# Patient Record
Sex: FEMALE | Race: BLACK OR AFRICAN AMERICAN | NOT HISPANIC OR LATINO
[De-identification: names, ages, dates, MRNs, and addresses within clinical notes are randomized per-mention and may not be internally consistent; named-entity substitution may affect disease eponyms.]

---

## 2017-01-27 ENCOUNTER — APPOINTMENT (OUTPATIENT)
Dept: ENDOCRINOLOGY | Facility: CLINIC | Age: 67
End: 2017-01-27

## 2017-02-01 ENCOUNTER — APPOINTMENT (OUTPATIENT)
Dept: RHEUMATOLOGY | Facility: CLINIC | Age: 67
End: 2017-02-01

## 2017-02-01 VITALS
BODY MASS INDEX: 36.14 KG/M2 | HEART RATE: 53 BPM | HEIGHT: 63 IN | SYSTOLIC BLOOD PRESSURE: 131 MMHG | DIASTOLIC BLOOD PRESSURE: 78 MMHG | WEIGHT: 204 LBS | TEMPERATURE: 98.1 F | OXYGEN SATURATION: 93 %

## 2017-02-01 DIAGNOSIS — M54.30 SCIATICA, UNSPECIFIED SIDE: ICD-10-CM

## 2017-02-01 DIAGNOSIS — M70.60 TROCHANTERIC BURSITIS, UNSPECIFIED HIP: ICD-10-CM

## 2017-02-02 ENCOUNTER — CLINICAL ADVICE (OUTPATIENT)
Age: 67
End: 2017-02-02

## 2017-02-02 LAB
ALBUMIN SERPL ELPH-MCNC: 3.8 G/DL
ALP BLD-CCNC: 44 U/L
ALT SERPL-CCNC: 18 U/L
ANION GAP SERPL CALC-SCNC: 17 MMOL/L
AST SERPL-CCNC: 19 U/L
BASOPHILS # BLD AUTO: 0.05 K/UL
BASOPHILS NFR BLD AUTO: 1.1 %
BILIRUB SERPL-MCNC: 0.3 MG/DL
BUN SERPL-MCNC: 14 MG/DL
CALCIUM SERPL-MCNC: 9.4 MG/DL
CHLORIDE SERPL-SCNC: 101 MMOL/L
CO2 SERPL-SCNC: 26 MMOL/L
CREAT SERPL-MCNC: 0.84 MG/DL
CRP SERPL-MCNC: <0.2 MG/DL
EOSINOPHIL # BLD AUTO: 0.09 K/UL
EOSINOPHIL NFR BLD AUTO: 1.9 %
ERYTHROCYTE [SEDIMENTATION RATE] IN BLOOD BY WESTERGREN METHOD: 36 MM/HR
GLUCOSE SERPL-MCNC: 112 MG/DL
HCT VFR BLD CALC: 39.1 %
HGB BLD-MCNC: 12.3 G/DL
IMM GRANULOCYTES NFR BLD AUTO: 0 %
LYMPHOCYTES # BLD AUTO: 2.4 K/UL
LYMPHOCYTES NFR BLD AUTO: 51.8 %
MAN DIFF?: NORMAL
MCHC RBC-ENTMCNC: 28 PG
MCHC RBC-ENTMCNC: 31.5 GM/DL
MCV RBC AUTO: 89.1 FL
MONOCYTES # BLD AUTO: 0.4 K/UL
MONOCYTES NFR BLD AUTO: 8.6 %
NEUTROPHILS # BLD AUTO: 1.69 K/UL
NEUTROPHILS NFR BLD AUTO: 36.6 %
PLATELET # BLD AUTO: 212 K/UL
POTASSIUM SERPL-SCNC: 3 MMOL/L
PROT SERPL-MCNC: 6.7 G/DL
RBC # BLD: 4.39 M/UL
RBC # FLD: 14.6 %
SODIUM SERPL-SCNC: 144 MMOL/L
WBC # FLD AUTO: 4.63 K/UL

## 2017-02-22 ENCOUNTER — OTHER (OUTPATIENT)
Age: 67
End: 2017-02-22

## 2017-03-03 ENCOUNTER — APPOINTMENT (OUTPATIENT)
Dept: RHEUMATOLOGY | Facility: CLINIC | Age: 67
End: 2017-03-03

## 2017-03-17 ENCOUNTER — APPOINTMENT (OUTPATIENT)
Dept: RHEUMATOLOGY | Facility: CLINIC | Age: 67
End: 2017-03-17

## 2017-03-17 VITALS
WEIGHT: 205 LBS | TEMPERATURE: 98.1 F | SYSTOLIC BLOOD PRESSURE: 161 MMHG | HEART RATE: 58 BPM | OXYGEN SATURATION: 95 % | HEIGHT: 63 IN | BODY MASS INDEX: 36.32 KG/M2 | DIASTOLIC BLOOD PRESSURE: 83 MMHG

## 2017-03-20 ENCOUNTER — TRANSCRIPTION ENCOUNTER (OUTPATIENT)
Age: 67
End: 2017-03-20

## 2017-03-20 LAB
ALBUMIN SERPL ELPH-MCNC: 4.1 G/DL
ALP BLD-CCNC: 46 U/L
ALT SERPL-CCNC: 28 U/L
ANION GAP SERPL CALC-SCNC: 15 MMOL/L
AST SERPL-CCNC: 25 U/L
BASOPHILS # BLD AUTO: 0.01 K/UL
BASOPHILS NFR BLD AUTO: 0.2 %
BILIRUB SERPL-MCNC: 0.2 MG/DL
BUN SERPL-MCNC: 13 MG/DL
CALCIUM SERPL-MCNC: 10.2 MG/DL
CHLORIDE SERPL-SCNC: 100 MMOL/L
CO2 SERPL-SCNC: 29 MMOL/L
CREAT SERPL-MCNC: 0.72 MG/DL
CRP SERPL-MCNC: <0.2 MG/DL
EOSINOPHIL # BLD AUTO: 0.07 K/UL
EOSINOPHIL NFR BLD AUTO: 1.5 %
ERYTHROCYTE [SEDIMENTATION RATE] IN BLOOD BY WESTERGREN METHOD: 30 MM/HR
GLUCOSE SERPL-MCNC: 97 MG/DL
HCT VFR BLD CALC: 41 %
HGB BLD-MCNC: 13.1 G/DL
IMM GRANULOCYTES NFR BLD AUTO: 0.2 %
LDH SERPL-CCNC: 207 U/L
LYMPHOCYTES # BLD AUTO: 1.19 K/UL
LYMPHOCYTES NFR BLD AUTO: 25.9 %
MAN DIFF?: NORMAL
MCHC RBC-ENTMCNC: 28.5 PG
MCHC RBC-ENTMCNC: 32 GM/DL
MCV RBC AUTO: 89.3 FL
MONOCYTES # BLD AUTO: 0.61 K/UL
MONOCYTES NFR BLD AUTO: 13.3 %
NEUTROPHILS # BLD AUTO: 2.7 K/UL
NEUTROPHILS NFR BLD AUTO: 58.9 %
PLATELET # BLD AUTO: 229 K/UL
POTASSIUM SERPL-SCNC: 3.4 MMOL/L
PROT SERPL-MCNC: 7 G/DL
RBC # BLD: 4.59 M/UL
RBC # FLD: 15 %
SODIUM SERPL-SCNC: 144 MMOL/L
WBC # FLD AUTO: 4.59 K/UL

## 2017-03-23 ENCOUNTER — FORM ENCOUNTER (OUTPATIENT)
Age: 67
End: 2017-03-23

## 2017-03-24 ENCOUNTER — OUTPATIENT (OUTPATIENT)
Dept: OUTPATIENT SERVICES | Facility: HOSPITAL | Age: 67
LOS: 1 days | End: 2017-03-24
Payer: COMMERCIAL

## 2017-03-24 ENCOUNTER — APPOINTMENT (OUTPATIENT)
Dept: CT IMAGING | Facility: IMAGING CENTER | Age: 67
End: 2017-03-24

## 2017-03-24 DIAGNOSIS — R10.9 UNSPECIFIED ABDOMINAL PAIN: ICD-10-CM

## 2017-03-24 PROCEDURE — 74176 CT ABD & PELVIS W/O CONTRAST: CPT

## 2017-05-31 ENCOUNTER — FORM ENCOUNTER (OUTPATIENT)
Age: 67
End: 2017-05-31

## 2017-06-01 ENCOUNTER — APPOINTMENT (OUTPATIENT)
Dept: RHEUMATOLOGY | Facility: CLINIC | Age: 67
End: 2017-06-01

## 2017-06-01 VITALS
DIASTOLIC BLOOD PRESSURE: 84 MMHG | BODY MASS INDEX: 38.45 KG/M2 | HEART RATE: 63 BPM | OXYGEN SATURATION: 95 % | SYSTOLIC BLOOD PRESSURE: 149 MMHG | HEIGHT: 63 IN | WEIGHT: 217 LBS

## 2017-06-01 RX ORDER — TOFACITINIB 11 MG/1
11 TABLET, FILM COATED, EXTENDED RELEASE ORAL
Qty: 90 | Refills: 0 | Status: DISCONTINUED | COMMUNITY
Start: 2017-02-01 | End: 2017-06-01

## 2017-06-02 LAB
ALBUMIN SERPL ELPH-MCNC: 3.9 G/DL
ALP BLD-CCNC: 47 U/L
ALT SERPL-CCNC: 19 U/L
ANION GAP SERPL CALC-SCNC: 22 MMOL/L
AST SERPL-CCNC: 19 U/L
BASOPHILS # BLD AUTO: 0.02 K/UL
BASOPHILS NFR BLD AUTO: 0.4 %
BILIRUB SERPL-MCNC: 0.3 MG/DL
BUN SERPL-MCNC: 16 MG/DL
CALCIUM SERPL-MCNC: 9.9 MG/DL
CHLORIDE SERPL-SCNC: 102 MMOL/L
CO2 SERPL-SCNC: 23 MMOL/L
CREAT SERPL-MCNC: 0.86 MG/DL
CRP SERPL-MCNC: 0.2 MG/DL
EOSINOPHIL # BLD AUTO: 0.13 K/UL
EOSINOPHIL NFR BLD AUTO: 2.7 %
ERYTHROCYTE [SEDIMENTATION RATE] IN BLOOD BY WESTERGREN METHOD: 31 MM/HR
GLUCOSE SERPL-MCNC: 103 MG/DL
HAV IGM SER QL: NONREACTIVE
HBA1C MFR BLD HPLC: 5.9 %
HBV CORE IGG+IGM SER QL: NONREACTIVE
HBV CORE IGM SER QL: NONREACTIVE
HBV SURFACE AG SER QL: NONREACTIVE
HCT VFR BLD CALC: 38.6 %
HCV AB SER QL: NONREACTIVE
HCV S/CO RATIO: 0.1 S/CO
HGB BLD-MCNC: 12.6 G/DL
IGA SER QL IEP: 165 MG/DL
IGG SER QL IEP: 957 MG/DL
IGM SER QL IEP: 61 MG/DL
IMM GRANULOCYTES NFR BLD AUTO: 0.2 %
LYMPHOCYTES # BLD AUTO: 2.05 K/UL
LYMPHOCYTES NFR BLD AUTO: 43.3 %
MAN DIFF?: NORMAL
MCHC RBC-ENTMCNC: 29 PG
MCHC RBC-ENTMCNC: 32.6 GM/DL
MCV RBC AUTO: 88.7 FL
MONOCYTES # BLD AUTO: 0.71 K/UL
MONOCYTES NFR BLD AUTO: 15 %
NEUTROPHILS # BLD AUTO: 1.81 K/UL
NEUTROPHILS NFR BLD AUTO: 38.4 %
PLATELET # BLD AUTO: 245 K/UL
POTASSIUM SERPL-SCNC: 3.8 MMOL/L
PROT SERPL-MCNC: 6.7 G/DL
RBC # BLD: 4.35 M/UL
RBC # FLD: 15 %
SODIUM SERPL-SCNC: 147 MMOL/L
WBC # FLD AUTO: 4.73 K/UL

## 2017-06-03 LAB
ADJUSTED MITOGEN: >10 IU/ML
ADJUSTED TB AG: 0 IU/ML
M TB IFN-G BLD-IMP: NEGATIVE
QUANTIFERON GOLD NIL: 0.06 IU/ML

## 2017-07-13 ENCOUNTER — APPOINTMENT (OUTPATIENT)
Dept: RHEUMATOLOGY | Facility: CLINIC | Age: 67
End: 2017-07-13

## 2017-07-13 VITALS
HEART RATE: 54 BPM | BODY MASS INDEX: 37.39 KG/M2 | OXYGEN SATURATION: 95 % | HEIGHT: 63 IN | DIASTOLIC BLOOD PRESSURE: 73 MMHG | WEIGHT: 211 LBS | SYSTOLIC BLOOD PRESSURE: 154 MMHG

## 2017-07-13 RX ORDER — PREDNISONE 2.5 MG/1
2.5 TABLET ORAL DAILY
Qty: 180 | Refills: 0 | Status: DISCONTINUED | COMMUNITY
Start: 2017-02-01 | End: 2017-07-13

## 2017-07-13 RX ORDER — METHYLPRED ACET/NACL,ISO-OS/PF 40 MG/ML
40 VIAL (ML) INJECTION
Qty: 1 | Refills: 0 | Status: COMPLETED | OUTPATIENT
Start: 2017-07-13

## 2017-07-13 RX ORDER — LIDOCAINE HYDROCHLORIDE 10 MG/ML
1 INJECTION, SOLUTION INFILTRATION; PERINEURAL
Refills: 0 | Status: COMPLETED | OUTPATIENT
Start: 2017-07-13

## 2017-07-13 RX ADMIN — METHYLPREDNISOLONE ACETATE MG/ML: 40 INJECTION, SUSPENSION INTRA-ARTICULAR; INTRALESIONAL; INTRAMUSCULAR; SOFT TISSUE at 00:00

## 2017-07-13 RX ADMIN — LIDOCAINE HYDROCHLORIDE %: 10 INJECTION, SOLUTION INFILTRATION; PERINEURAL at 00:00

## 2017-07-14 LAB
ALBUMIN SERPL ELPH-MCNC: 4.1 G/DL
ALP BLD-CCNC: 49 U/L
ALT SERPL-CCNC: 19 U/L
ANION GAP SERPL CALC-SCNC: 14 MMOL/L
AST SERPL-CCNC: 13 U/L
BASOPHILS # BLD AUTO: 0.04 K/UL
BASOPHILS NFR BLD AUTO: 0.8 %
BILIRUB SERPL-MCNC: 0.3 MG/DL
BUN SERPL-MCNC: 20 MG/DL
CALCIUM SERPL-MCNC: 10.2 MG/DL
CHLORIDE SERPL-SCNC: 104 MMOL/L
CO2 SERPL-SCNC: 29 MMOL/L
CREAT SERPL-MCNC: 0.92 MG/DL
CRP SERPL-MCNC: <0.2 MG/DL
EOSINOPHIL # BLD AUTO: 0.18 K/UL
EOSINOPHIL NFR BLD AUTO: 3.4 %
ERYTHROCYTE [SEDIMENTATION RATE] IN BLOOD BY WESTERGREN METHOD: 22 MM/HR
GLUCOSE SERPL-MCNC: 103 MG/DL
HCT VFR BLD CALC: 39.2 %
HGB BLD-MCNC: 12.3 G/DL
IMM GRANULOCYTES NFR BLD AUTO: 0.2 %
LYMPHOCYTES # BLD AUTO: 2.56 K/UL
LYMPHOCYTES NFR BLD AUTO: 48.6 %
MAN DIFF?: NORMAL
MCHC RBC-ENTMCNC: 28.4 PG
MCHC RBC-ENTMCNC: 31.4 GM/DL
MCV RBC AUTO: 90.5 FL
MONOCYTES # BLD AUTO: 0.54 K/UL
MONOCYTES NFR BLD AUTO: 10.2 %
NEUTROPHILS # BLD AUTO: 1.94 K/UL
NEUTROPHILS NFR BLD AUTO: 36.8 %
PLATELET # BLD AUTO: 243 K/UL
POTASSIUM SERPL-SCNC: 4.5 MMOL/L
PROT SERPL-MCNC: 6.7 G/DL
RBC # BLD: 4.33 M/UL
RBC # FLD: 14.8 %
SODIUM SERPL-SCNC: 147 MMOL/L
WBC # FLD AUTO: 5.27 K/UL

## 2017-09-20 ENCOUNTER — APPOINTMENT (OUTPATIENT)
Dept: RHEUMATOLOGY | Facility: CLINIC | Age: 67
End: 2017-09-20
Payer: MEDICARE

## 2017-09-20 VITALS
WEIGHT: 210 LBS | HEIGHT: 63 IN | OXYGEN SATURATION: 96 % | BODY MASS INDEX: 37.21 KG/M2 | TEMPERATURE: 97.6 F | DIASTOLIC BLOOD PRESSURE: 77 MMHG | SYSTOLIC BLOOD PRESSURE: 131 MMHG | HEART RATE: 58 BPM

## 2017-09-20 PROCEDURE — 99215 OFFICE O/P EST HI 40 MIN: CPT

## 2017-09-22 LAB
ALBUMIN SERPL ELPH-MCNC: 4.1 G/DL
ALP BLD-CCNC: 64 U/L
ALT SERPL-CCNC: 20 U/L
ANION GAP SERPL CALC-SCNC: 15 MMOL/L
AST SERPL-CCNC: 19 U/L
BASOPHILS # BLD AUTO: 0.03 K/UL
BASOPHILS NFR BLD AUTO: 0.7 %
BILIRUB SERPL-MCNC: 0.2 MG/DL
BUN SERPL-MCNC: 15 MG/DL
CALCIUM SERPL-MCNC: 9.5 MG/DL
CHLORIDE SERPL-SCNC: 100 MMOL/L
CO2 SERPL-SCNC: 27 MMOL/L
CREAT SERPL-MCNC: 0.87 MG/DL
CRP SERPL-MCNC: 0.2 MG/DL
EOSINOPHIL # BLD AUTO: 0.3 K/UL
EOSINOPHIL NFR BLD AUTO: 6.6 %
GLUCOSE SERPL-MCNC: 113 MG/DL
HBA1C MFR BLD HPLC: 6 %
HCT VFR BLD CALC: 38.4 %
HGB BLD-MCNC: 12.2 G/DL
IMM GRANULOCYTES NFR BLD AUTO: 0 %
LYMPHOCYTES # BLD AUTO: 2.03 K/UL
LYMPHOCYTES NFR BLD AUTO: 44.8 %
MAN DIFF?: NORMAL
MCHC RBC-ENTMCNC: 28 PG
MCHC RBC-ENTMCNC: 31.8 GM/DL
MCV RBC AUTO: 88.1 FL
MONOCYTES # BLD AUTO: 0.59 K/UL
MONOCYTES NFR BLD AUTO: 13 %
NEUTROPHILS # BLD AUTO: 1.58 K/UL
NEUTROPHILS NFR BLD AUTO: 34.9 %
PLATELET # BLD AUTO: 270 K/UL
POTASSIUM SERPL-SCNC: 3.4 MMOL/L
PROT SERPL-MCNC: 6.8 G/DL
RBC # BLD: 4.36 M/UL
RBC # FLD: 14.7 %
SODIUM SERPL-SCNC: 142 MMOL/L
WBC # FLD AUTO: 4.53 K/UL

## 2017-10-10 ENCOUNTER — RX RENEWAL (OUTPATIENT)
Age: 67
End: 2017-10-10

## 2017-10-11 ENCOUNTER — APPOINTMENT (OUTPATIENT)
Dept: RHEUMATOLOGY | Facility: CLINIC | Age: 67
End: 2017-10-11

## 2017-10-24 ENCOUNTER — APPOINTMENT (OUTPATIENT)
Dept: DERMATOLOGY | Facility: CLINIC | Age: 67
End: 2017-10-24
Payer: MEDICARE

## 2017-10-24 VITALS
DIASTOLIC BLOOD PRESSURE: 102 MMHG | HEIGHT: 64 IN | WEIGHT: 190 LBS | SYSTOLIC BLOOD PRESSURE: 164 MMHG | BODY MASS INDEX: 32.44 KG/M2

## 2017-10-24 DIAGNOSIS — Z86.69 PERSONAL HISTORY OF OTHER DISEASES OF THE NERVOUS SYSTEM AND SENSE ORGANS: ICD-10-CM

## 2017-10-24 DIAGNOSIS — Z86.010 PERSONAL HISTORY OF COLONIC POLYPS: ICD-10-CM

## 2017-10-24 DIAGNOSIS — Z86.39 PERSONAL HISTORY OF OTHER ENDOCRINE, NUTRITIONAL AND METABOLIC DISEASE: ICD-10-CM

## 2017-10-24 DIAGNOSIS — H54.7 UNSPECIFIED VISUAL LOSS: ICD-10-CM

## 2017-10-24 DIAGNOSIS — Z87.2 PERSONAL HISTORY OF DISEASES OF THE SKIN AND SUBCUTANEOUS TISSUE: ICD-10-CM

## 2017-10-24 DIAGNOSIS — L85.9 EPIDERMAL THICKENING, UNSPECIFIED: ICD-10-CM

## 2017-10-24 DIAGNOSIS — Z87.19 PERSONAL HISTORY OF OTHER DISEASES OF THE DIGESTIVE SYSTEM: ICD-10-CM

## 2017-10-24 PROCEDURE — 99203 OFFICE O/P NEW LOW 30 MIN: CPT

## 2017-12-15 ENCOUNTER — APPOINTMENT (OUTPATIENT)
Dept: RHEUMATOLOGY | Facility: CLINIC | Age: 67
End: 2017-12-15
Payer: MEDICARE

## 2017-12-15 VITALS
SYSTOLIC BLOOD PRESSURE: 150 MMHG | WEIGHT: 190 LBS | DIASTOLIC BLOOD PRESSURE: 88 MMHG | TEMPERATURE: 97.6 F | OXYGEN SATURATION: 96 % | HEART RATE: 60 BPM | RESPIRATION RATE: 16 BRPM | BODY MASS INDEX: 32.44 KG/M2 | HEIGHT: 64 IN

## 2017-12-15 PROCEDURE — 20605 DRAIN/INJ JOINT/BURSA W/O US: CPT | Mod: RT

## 2017-12-15 PROCEDURE — 99214 OFFICE O/P EST MOD 30 MIN: CPT | Mod: 25

## 2017-12-15 RX ORDER — LIDOCAINE HYDROCHLORIDE 10 MG/ML
1 INJECTION, SOLUTION INFILTRATION; PERINEURAL
Refills: 0 | Status: COMPLETED | OUTPATIENT
Start: 2017-12-15

## 2017-12-15 RX ORDER — METHYLPRED ACET/NACL,ISO-OS/PF 40 MG/ML
40 VIAL (ML) INJECTION
Qty: 1 | Refills: 0 | Status: COMPLETED | OUTPATIENT
Start: 2017-12-15

## 2017-12-15 RX ADMIN — METHYLPREDNISOLONE ACETATE MG/ML: 40 INJECTION, SUSPENSION INTRA-ARTICULAR; INTRALESIONAL; INTRAMUSCULAR; SOFT TISSUE at 00:00

## 2017-12-15 RX ADMIN — LIDOCAINE HYDROCHLORIDE %: 10 INJECTION, SOLUTION INFILTRATION; PERINEURAL at 00:00

## 2017-12-17 LAB
25(OH)D3 SERPL-MCNC: 20.1 NG/ML
ALBUMIN SERPL ELPH-MCNC: 4.4 G/DL
ALP BLD-CCNC: 55 U/L
ALT SERPL-CCNC: 25 U/L
ANION GAP SERPL CALC-SCNC: 15 MMOL/L
AST SERPL-CCNC: 19 U/L
BASOPHILS # BLD AUTO: 0.03 K/UL
BASOPHILS NFR BLD AUTO: 0.7 %
BILIRUB SERPL-MCNC: 0.3 MG/DL
BUN SERPL-MCNC: 15 MG/DL
CALCIUM SERPL-MCNC: 9.9 MG/DL
CHLORIDE SERPL-SCNC: 101 MMOL/L
CO2 SERPL-SCNC: 29 MMOL/L
CREAT SERPL-MCNC: 0.85 MG/DL
CRP SERPL-MCNC: <0.2 MG/DL
ENA SS-A AB SER IA-ACNC: <0.2 AL
ENA SS-B AB SER IA-ACNC: 0.2 AL
EOSINOPHIL # BLD AUTO: 0.11 K/UL
EOSINOPHIL NFR BLD AUTO: 2.4 %
ERYTHROCYTE [SEDIMENTATION RATE] IN BLOOD BY WESTERGREN METHOD: 44 MM/HR
GLUCOSE SERPL-MCNC: 95 MG/DL
HCT VFR BLD CALC: 40.5 %
HGB BLD-MCNC: 12.8 G/DL
IMM GRANULOCYTES NFR BLD AUTO: 0 %
LYMPHOCYTES # BLD AUTO: 2.18 K/UL
LYMPHOCYTES NFR BLD AUTO: 47.5 %
MAN DIFF?: NORMAL
MCHC RBC-ENTMCNC: 27.8 PG
MCHC RBC-ENTMCNC: 31.6 GM/DL
MCV RBC AUTO: 87.9 FL
MONOCYTES # BLD AUTO: 0.47 K/UL
MONOCYTES NFR BLD AUTO: 10.2 %
NEUTROPHILS # BLD AUTO: 1.8 K/UL
NEUTROPHILS NFR BLD AUTO: 39.2 %
PLATELET # BLD AUTO: 256 K/UL
POTASSIUM SERPL-SCNC: 3.5 MMOL/L
PROT SERPL-MCNC: 7.4 G/DL
RBC # BLD: 4.61 M/UL
RBC # FLD: 15.7 %
SODIUM SERPL-SCNC: 145 MMOL/L
URATE SERPL-MCNC: 3.5 MG/DL
WBC # FLD AUTO: 4.59 K/UL

## 2018-01-02 ENCOUNTER — RX RENEWAL (OUTPATIENT)
Age: 68
End: 2018-01-02

## 2018-04-02 ENCOUNTER — RX RENEWAL (OUTPATIENT)
Age: 68
End: 2018-04-02

## 2018-04-12 ENCOUNTER — APPOINTMENT (OUTPATIENT)
Dept: RHEUMATOLOGY | Facility: CLINIC | Age: 68
End: 2018-04-12

## 2018-04-18 ENCOUNTER — RX RENEWAL (OUTPATIENT)
Age: 68
End: 2018-04-18

## 2018-04-23 ENCOUNTER — RX RENEWAL (OUTPATIENT)
Age: 68
End: 2018-04-23

## 2018-04-27 ENCOUNTER — TRANSCRIPTION ENCOUNTER (OUTPATIENT)
Age: 68
End: 2018-04-27

## 2018-04-27 LAB
ALBUMIN SERPL ELPH-MCNC: 4 G/DL
ALP BLD-CCNC: 51 U/L
ALT SERPL-CCNC: 14 U/L
ANION GAP SERPL CALC-SCNC: 14 MMOL/L
AST SERPL-CCNC: 16 U/L
BASOPHILS # BLD AUTO: 0.02 K/UL
BASOPHILS NFR BLD AUTO: 0.4 %
BILIRUB SERPL-MCNC: 0.3 MG/DL
BUN SERPL-MCNC: 21 MG/DL
CALCIUM SERPL-MCNC: 9.4 MG/DL
CHLORIDE SERPL-SCNC: 102 MMOL/L
CO2 SERPL-SCNC: 27 MMOL/L
CREAT SERPL-MCNC: 0.8 MG/DL
CRP SERPL-MCNC: <0.2 MG/DL
EOSINOPHIL # BLD AUTO: 0.14 K/UL
EOSINOPHIL NFR BLD AUTO: 2.8 %
ERYTHROCYTE [SEDIMENTATION RATE] IN BLOOD BY WESTERGREN METHOD: 29 MM/HR
GLUCOSE SERPL-MCNC: 91 MG/DL
HCT VFR BLD CALC: 37.6 %
HGB BLD-MCNC: 11.7 G/DL
IMM GRANULOCYTES NFR BLD AUTO: 0.2 %
LYMPHOCYTES # BLD AUTO: 2.04 K/UL
LYMPHOCYTES NFR BLD AUTO: 40.7 %
MAN DIFF?: NORMAL
MCHC RBC-ENTMCNC: 28.1 PG
MCHC RBC-ENTMCNC: 31.1 GM/DL
MCV RBC AUTO: 90.4 FL
MONOCYTES # BLD AUTO: 0.74 K/UL
MONOCYTES NFR BLD AUTO: 14.8 %
NEUTROPHILS # BLD AUTO: 2.06 K/UL
NEUTROPHILS NFR BLD AUTO: 41.1 %
PLATELET # BLD AUTO: 236 K/UL
POTASSIUM SERPL-SCNC: 4.2 MMOL/L
PROT SERPL-MCNC: 6.8 G/DL
RBC # BLD: 4.16 M/UL
RBC # FLD: 15.1 %
SODIUM SERPL-SCNC: 143 MMOL/L
WBC # FLD AUTO: 5.01 K/UL

## 2018-05-01 ENCOUNTER — APPOINTMENT (OUTPATIENT)
Dept: RHEUMATOLOGY | Facility: CLINIC | Age: 68
End: 2018-05-01
Payer: MEDICARE

## 2018-05-01 VITALS
DIASTOLIC BLOOD PRESSURE: 72 MMHG | SYSTOLIC BLOOD PRESSURE: 120 MMHG | HEIGHT: 64 IN | WEIGHT: 220 LBS | HEART RATE: 68 BPM | BODY MASS INDEX: 37.56 KG/M2 | TEMPERATURE: 97.4 F | OXYGEN SATURATION: 95 %

## 2018-05-01 DIAGNOSIS — M65.4 RADIAL STYLOID TENOSYNOVITIS [DE QUERVAIN]: ICD-10-CM

## 2018-05-01 PROCEDURE — 99215 OFFICE O/P EST HI 40 MIN: CPT

## 2018-05-01 RX ORDER — HYDROCODONE BITARTRATE AND ACETAMINOPHEN 10; 325 MG/1; MG/1
10-325 TABLET ORAL
Qty: 90 | Refills: 0 | Status: DISCONTINUED | COMMUNITY
Start: 2016-12-15 | End: 2018-05-01

## 2018-05-01 RX ORDER — PREDNISONE 2.5 MG/1
2.5 TABLET ORAL
Qty: 180 | Refills: 0 | Status: DISCONTINUED | COMMUNITY
Start: 2017-09-20 | End: 2018-05-01

## 2018-06-06 ENCOUNTER — APPOINTMENT (OUTPATIENT)
Dept: RHEUMATOLOGY | Facility: CLINIC | Age: 68
End: 2018-06-06
Payer: MEDICARE

## 2018-06-06 VITALS
WEIGHT: 221 LBS | BODY MASS INDEX: 37.73 KG/M2 | HEART RATE: 64 BPM | RESPIRATION RATE: 16 BRPM | TEMPERATURE: 97.8 F | SYSTOLIC BLOOD PRESSURE: 134 MMHG | HEIGHT: 64 IN | OXYGEN SATURATION: 95 % | DIASTOLIC BLOOD PRESSURE: 90 MMHG

## 2018-06-06 DIAGNOSIS — K57.30 DIVERTICULOSIS OF LARGE INTESTINE W/OUT PERFORATION OR ABSCESS W/OUT BLEEDING: ICD-10-CM

## 2018-06-06 PROCEDURE — 96372 THER/PROPH/DIAG INJ SC/IM: CPT

## 2018-06-06 PROCEDURE — 99214 OFFICE O/P EST MOD 30 MIN: CPT | Mod: 25

## 2018-06-06 RX ORDER — CERTOLIZUMAB PEGOL 200 MG/ML
2 X 200 INJECTION, SOLUTION SUBCUTANEOUS
Qty: 3 | Refills: 0 | Status: DISCONTINUED | COMMUNITY
Start: 2018-01-02 | End: 2018-06-06

## 2018-06-06 RX ORDER — METHYLPRED ACET/NACL,ISO-OS/PF 40 MG/ML
40 VIAL (ML) INJECTION
Qty: 1 | Refills: 0 | Status: COMPLETED | OUTPATIENT
Start: 2018-06-06

## 2018-06-06 RX ORDER — CERTOLIZUMAB PEGOL 400 MG
2 X 200 KIT SUBCUTANEOUS
Qty: 6 | Refills: 0 | Status: DISCONTINUED | COMMUNITY
Start: 2017-09-20 | End: 2018-06-06

## 2018-06-06 RX ADMIN — METHYLPREDNISOLONE ACETATE MG/ML: 40 INJECTION, SUSPENSION INTRA-ARTICULAR; INTRALESIONAL; INTRAMUSCULAR; SOFT TISSUE at 00:00

## 2018-06-07 LAB
ALBUMIN SERPL ELPH-MCNC: 4.2 G/DL
ALP BLD-CCNC: 50 U/L
ALT SERPL-CCNC: 21 U/L
ANION GAP SERPL CALC-SCNC: 16 MMOL/L
AST SERPL-CCNC: 22 U/L
BASOPHILS # BLD AUTO: 0.02 K/UL
BASOPHILS NFR BLD AUTO: 0.4 %
BILIRUB SERPL-MCNC: <0.2 MG/DL
BUN SERPL-MCNC: 21 MG/DL
CALCIUM SERPL-MCNC: 10.2 MG/DL
CHLORIDE SERPL-SCNC: 102 MMOL/L
CO2 SERPL-SCNC: 22 MMOL/L
CREAT SERPL-MCNC: 1 MG/DL
CRP SERPL-MCNC: <0.2 MG/DL
EOSINOPHIL # BLD AUTO: 0.13 K/UL
EOSINOPHIL NFR BLD AUTO: 2.8 %
GLUCOSE SERPL-MCNC: 98 MG/DL
HAV IGM SER QL: NONREACTIVE
HBV CORE IGG+IGM SER QL: NONREACTIVE
HBV CORE IGM SER QL: NONREACTIVE
HBV SURFACE AG SER QL: NONREACTIVE
HCT VFR BLD CALC: 39.7 %
HCV AB SER QL: NONREACTIVE
HCV S/CO RATIO: 0.1 S/CO
HGB BLD-MCNC: 12.5 G/DL
IMM GRANULOCYTES NFR BLD AUTO: 0 %
LYMPHOCYTES # BLD AUTO: 1.9 K/UL
LYMPHOCYTES NFR BLD AUTO: 40.7 %
MAN DIFF?: NORMAL
MCHC RBC-ENTMCNC: 28.2 PG
MCHC RBC-ENTMCNC: 31.5 GM/DL
MCV RBC AUTO: 89.4 FL
MONOCYTES # BLD AUTO: 0.51 K/UL
MONOCYTES NFR BLD AUTO: 10.9 %
NEUTROPHILS # BLD AUTO: 2.11 K/UL
NEUTROPHILS NFR BLD AUTO: 45.2 %
PLATELET # BLD AUTO: 239 K/UL
POTASSIUM SERPL-SCNC: 4.1 MMOL/L
PROT SERPL-MCNC: 7.1 G/DL
RBC # BLD: 4.44 M/UL
RBC # FLD: 15.1 %
SODIUM SERPL-SCNC: 140 MMOL/L
WBC # FLD AUTO: 4.67 K/UL

## 2018-06-12 LAB
ADJUSTED MITOGEN: >10 IU/ML
ADJUSTED TB AG: -0.01 IU/ML
M TB IFN-G BLD-IMP: NEGATIVE
QUANTIFERON GOLD NIL: 0.05 IU/ML

## 2018-08-01 ENCOUNTER — APPOINTMENT (OUTPATIENT)
Dept: RHEUMATOLOGY | Facility: CLINIC | Age: 68
End: 2018-08-01

## 2018-08-15 ENCOUNTER — APPOINTMENT (OUTPATIENT)
Dept: RHEUMATOLOGY | Facility: CLINIC | Age: 68
End: 2018-08-15
Payer: MEDICARE

## 2018-08-15 VITALS
OXYGEN SATURATION: 96 % | DIASTOLIC BLOOD PRESSURE: 78 MMHG | SYSTOLIC BLOOD PRESSURE: 122 MMHG | RESPIRATION RATE: 16 BRPM | WEIGHT: 211 LBS | HEART RATE: 68 BPM | HEIGHT: 64 IN | TEMPERATURE: 97.8 F | BODY MASS INDEX: 36.02 KG/M2

## 2018-08-15 DIAGNOSIS — M35.00 SICCA SYNDROME, UNSPECIFIED: ICD-10-CM

## 2018-08-15 PROCEDURE — 99214 OFFICE O/P EST MOD 30 MIN: CPT

## 2018-11-27 ENCOUNTER — LABORATORY RESULT (OUTPATIENT)
Age: 68
End: 2018-11-27

## 2018-11-27 ENCOUNTER — APPOINTMENT (OUTPATIENT)
Dept: RHEUMATOLOGY | Facility: CLINIC | Age: 68
End: 2018-11-27
Payer: MEDICARE

## 2018-11-27 VITALS
WEIGHT: 211 LBS | BODY MASS INDEX: 36.02 KG/M2 | HEIGHT: 64 IN | TEMPERATURE: 97.8 F | DIASTOLIC BLOOD PRESSURE: 86 MMHG | SYSTOLIC BLOOD PRESSURE: 144 MMHG | HEART RATE: 68 BPM | OXYGEN SATURATION: 98 %

## 2018-11-27 PROCEDURE — 99214 OFFICE O/P EST MOD 30 MIN: CPT

## 2018-11-30 LAB
ALBUMIN MFR SERPL ELPH: 57.4 %
ALBUMIN SERPL ELPH-MCNC: 4.3 G/DL
ALBUMIN SERPL-MCNC: 3.8 G/DL
ALBUMIN/GLOB SERPL: 1.3 RATIO
ALP BLD-CCNC: 56 U/L
ALPHA1 GLOB MFR SERPL ELPH: 4.8 %
ALPHA1 GLOB SERPL ELPH-MCNC: 0.3 G/DL
ALPHA2 GLOB MFR SERPL ELPH: 12.1 %
ALPHA2 GLOB SERPL ELPH-MCNC: 0.8 G/DL
ALT SERPL-CCNC: 15 U/L
ANION GAP SERPL CALC-SCNC: 11 MMOL/L
AST SERPL-CCNC: 17 U/L
B-GLOBULIN MFR SERPL ELPH: 11.6 %
B-GLOBULIN SERPL ELPH-MCNC: 0.8 G/DL
BASOPHILS # BLD AUTO: 0.02 K/UL
BASOPHILS NFR BLD AUTO: 0.4 %
BILIRUB SERPL-MCNC: 0.2 MG/DL
BUN SERPL-MCNC: 19 MG/DL
CALCIUM SERPL-MCNC: 10 MG/DL
CHLORIDE SERPL-SCNC: 105 MMOL/L
CO2 SERPL-SCNC: 28 MMOL/L
CREAT SERPL-MCNC: 0.99 MG/DL
EOSINOPHIL # BLD AUTO: 0.13 K/UL
EOSINOPHIL NFR BLD AUTO: 2.4 %
ERYTHROCYTE [SEDIMENTATION RATE] IN BLOOD BY WESTERGREN METHOD: 28 MM/HR
GAMMA GLOB FLD ELPH-MCNC: 0.9 G/DL
GAMMA GLOB MFR SERPL ELPH: 14.1 %
GLUCOSE SERPL-MCNC: 107 MG/DL
HCT VFR BLD CALC: 38.1 %
HGB BLD-MCNC: 12 G/DL
IMM GRANULOCYTES NFR BLD AUTO: 0.2 %
INTERPRETATION SERPL IEP-IMP: NORMAL
LYMPHOCYTES # BLD AUTO: 1.54 K/UL
LYMPHOCYTES NFR BLD AUTO: 28.6 %
MAN DIFF?: NORMAL
MCHC RBC-ENTMCNC: 28.5 PG
MCHC RBC-ENTMCNC: 31.5 GM/DL
MCV RBC AUTO: 90.5 FL
MONOCYTES # BLD AUTO: 0.83 K/UL
MONOCYTES NFR BLD AUTO: 15.4 %
NEUTROPHILS # BLD AUTO: 2.86 K/UL
NEUTROPHILS NFR BLD AUTO: 53 %
PLATELET # BLD AUTO: 246 K/UL
POTASSIUM SERPL-SCNC: 4 MMOL/L
PROT SERPL-MCNC: 6.7 G/DL
RBC # BLD: 4.21 M/UL
RBC # FLD: 15.1 %
SODIUM SERPL-SCNC: 144 MMOL/L
WBC # FLD AUTO: 5.39 K/UL

## 2018-12-12 ENCOUNTER — RX RENEWAL (OUTPATIENT)
Age: 68
End: 2018-12-12

## 2018-12-17 ENCOUNTER — APPOINTMENT (OUTPATIENT)
Dept: INTERNAL MEDICINE | Facility: CLINIC | Age: 68
End: 2018-12-17
Payer: MEDICARE

## 2018-12-17 ENCOUNTER — NON-APPOINTMENT (OUTPATIENT)
Age: 68
End: 2018-12-17

## 2018-12-17 VITALS
OXYGEN SATURATION: 93 % | DIASTOLIC BLOOD PRESSURE: 81 MMHG | HEART RATE: 66 BPM | SYSTOLIC BLOOD PRESSURE: 146 MMHG | HEIGHT: 64 IN | BODY MASS INDEX: 35.68 KG/M2 | WEIGHT: 209 LBS

## 2018-12-17 DIAGNOSIS — R93.89 ABNORMAL FINDINGS ON DIAGNOSTIC IMAGING OF OTHER SPECIFIED BODY STRUCTURES: ICD-10-CM

## 2018-12-17 PROCEDURE — 93000 ELECTROCARDIOGRAM COMPLETE: CPT

## 2018-12-17 PROCEDURE — 99213 OFFICE O/P EST LOW 20 MIN: CPT | Mod: 25

## 2018-12-17 NOTE — PHYSICAL EXAM
[No Acute Distress] : no acute distress [Well Nourished] : well nourished [Well Developed] : well developed [Well-Appearing] : well-appearing [No JVD] : no jugular venous distention [No Respiratory Distress] : no respiratory distress  [Clear to Auscultation] : lungs were clear to auscultation bilaterally [No Accessory Muscle Use] : no accessory muscle use [Normal Percussion] : the chest was normal to percussion [Normal Rate] : normal rate  [Regular Rhythm] : with a regular rhythm [Normal S1, S2] : normal S1 and S2 [No Murmur] : no murmur heard [Normal Gait] : normal gait [Coordination Grossly Intact] : coordination grossly intact [No Focal Deficits] : no focal deficits

## 2018-12-17 NOTE — HISTORY OF PRESENT ILLNESS
[FreeTextEntry1] : palpitations [de-identified] : says she has had this for a long time but seems more often and more  intense usually lastabout 15 minutes  now in office she says she feels nothing   she has a history of graves disease  and she is to f/u with endo re her enlarging throid nodule she also says she brought old x ray discs to  recent radiologist  for comparison to assess possible inc in right hilum

## 2018-12-19 ENCOUNTER — MEDICATION RENEWAL (OUTPATIENT)
Age: 68
End: 2018-12-19

## 2018-12-20 ENCOUNTER — APPOINTMENT (OUTPATIENT)
Dept: ENDOCRINOLOGY | Facility: CLINIC | Age: 68
End: 2018-12-20

## 2018-12-20 ENCOUNTER — APPOINTMENT (OUTPATIENT)
Dept: ENDOCRINOLOGY | Facility: CLINIC | Age: 68
End: 2018-12-20
Payer: MEDICARE

## 2018-12-20 VITALS
OXYGEN SATURATION: 96 % | SYSTOLIC BLOOD PRESSURE: 126 MMHG | HEIGHT: 64 IN | DIASTOLIC BLOOD PRESSURE: 80 MMHG | HEART RATE: 60 BPM | BODY MASS INDEX: 35.51 KG/M2 | WEIGHT: 208 LBS

## 2018-12-20 PROCEDURE — 99205 OFFICE O/P NEW HI 60 MIN: CPT

## 2018-12-20 RX ORDER — PREDNISONE 10 MG/1
10 TABLET ORAL
Qty: 30 | Refills: 1 | Status: DISCONTINUED | COMMUNITY
Start: 2018-11-27 | End: 2018-12-20

## 2018-12-20 RX ORDER — METOPROLOL SUCCINATE 25 MG/1
25 TABLET, EXTENDED RELEASE ORAL
Qty: 90 | Refills: 0 | Status: DISCONTINUED | COMMUNITY
Start: 2018-12-17 | End: 2018-12-20

## 2018-12-20 RX ORDER — PREDNISONE 2.5 MG/1
2.5 TABLET ORAL
Qty: 180 | Refills: 1 | Status: DISCONTINUED | COMMUNITY
Start: 2018-06-06 | End: 2018-12-20

## 2018-12-20 RX ORDER — IPRATROPIUM BROMIDE 42 UG/1
0.06 SPRAY NASAL
Qty: 15 | Refills: 0 | Status: DISCONTINUED | COMMUNITY
Start: 2018-09-26

## 2018-12-22 LAB
ALBUMIN SERPL ELPH-MCNC: 4 G/DL
ALP BLD-CCNC: 59 U/L
ALT SERPL-CCNC: 21 U/L
ANION GAP SERPL CALC-SCNC: 11 MMOL/L
AST SERPL-CCNC: 20 U/L
BASOPHILS # BLD AUTO: 0.02 K/UL
BASOPHILS NFR BLD AUTO: 0.4 %
BILIRUB SERPL-MCNC: 0.2 MG/DL
BUN SERPL-MCNC: 17 MG/DL
CALCIUM SERPL-MCNC: 10.3 MG/DL
CHLORIDE SERPL-SCNC: 103 MMOL/L
CO2 SERPL-SCNC: 29 MMOL/L
CREAT SERPL-MCNC: 0.71 MG/DL
EOSINOPHIL # BLD AUTO: 0.1 K/UL
EOSINOPHIL NFR BLD AUTO: 2 %
GLUCOSE SERPL-MCNC: 95 MG/DL
HCT VFR BLD CALC: 39.8 %
HGB BLD-MCNC: 12.6 G/DL
IMM GRANULOCYTES NFR BLD AUTO: 0.2 %
LYMPHOCYTES # BLD AUTO: 1.73 K/UL
LYMPHOCYTES NFR BLD AUTO: 35.2 %
MAN DIFF?: NORMAL
MCHC RBC-ENTMCNC: 27.9 PG
MCHC RBC-ENTMCNC: 31.7 GM/DL
MCV RBC AUTO: 88.1 FL
MONOCYTES # BLD AUTO: 0.42 K/UL
MONOCYTES NFR BLD AUTO: 8.5 %
NEUTROPHILS # BLD AUTO: 2.64 K/UL
NEUTROPHILS NFR BLD AUTO: 53.7 %
PLATELET # BLD AUTO: 267 K/UL
POTASSIUM SERPL-SCNC: 4.1 MMOL/L
PROT SERPL-MCNC: 7.3 G/DL
RBC # BLD: 4.52 M/UL
RBC # FLD: 14.9 %
SODIUM SERPL-SCNC: 143 MMOL/L
T4 SERPL-MCNC: 7.9 UG/DL
THYROGLOB AB SERPL-ACNC: <20 IU/ML
THYROPEROXIDASE AB SERPL IA-ACNC: <10 IU/ML
TSH SERPL-ACNC: 0.41 UIU/ML
TSI ACT/NOR SER: <0.1 IU/L
WBC # FLD AUTO: 4.92 K/UL

## 2018-12-26 LAB — TSH RECEPTOR AB: <0.5 IU/L

## 2018-12-27 ENCOUNTER — OTHER (OUTPATIENT)
Age: 68
End: 2018-12-27

## 2019-01-30 ENCOUNTER — APPOINTMENT (OUTPATIENT)
Dept: INTERNAL MEDICINE | Facility: CLINIC | Age: 69
End: 2019-01-30
Payer: MEDICARE

## 2019-01-30 VITALS
BODY MASS INDEX: 35.85 KG/M2 | DIASTOLIC BLOOD PRESSURE: 76 MMHG | WEIGHT: 210 LBS | HEART RATE: 87 BPM | HEIGHT: 64 IN | SYSTOLIC BLOOD PRESSURE: 140 MMHG

## 2019-01-30 PROCEDURE — 99214 OFFICE O/P EST MOD 30 MIN: CPT

## 2019-01-30 NOTE — PHYSICAL EXAM
[No Acute Distress] : no acute distress [Well Nourished] : well nourished [Well Developed] : well developed [Well-Appearing] : well-appearing [Normal Sclera/Conjunctiva] : normal sclera/conjunctiva [Normal Outer Ear/Nose] : the outer ears and nose were normal in appearance [No JVD] : no jugular venous distention [No Respiratory Distress] : no respiratory distress  [Normal Rate] : normal rate  [Regular Rhythm] : with a regular rhythm [Normal S1, S2] : normal S1 and S2 [Declined Breast Exam] : declined breast exam  [Speech Grossly Normal] : speech grossly normal [Normal Affect] : the affect was normal [Normal Mood] : the mood was normal [Normal Insight/Judgement] : insight and judgment were intact [No Edema] : there was no peripheral edema [Normal Gait] : normal gait [Coordination Grossly Intact] : coordination grossly intact [de-identified] : no calf tyendernes or asymmetry

## 2019-01-30 NOTE — ASSESSMENT
[FreeTextEntry1] : likely  chest wall origin of pain poss post cough poss  viral pleuritis less likely is pe as O2   SAT%  ok  though she did have a post surgey pe 25 yrs ago

## 2019-01-30 NOTE — COUNSELING
[de-identified] : possible causes of pleuritic pain reviewed and explained how  need to r/o pe only because it represents most danger though not likely  explained rx is otherwise nsaids if tolerated  and likely viral uri not much to do about otherwise and is self limited

## 2019-01-30 NOTE — HISTORY OF PRESENT ILLNESS
[FreeTextEntry1] : she has had uri sx for 1 week no def fevrr but this morning she woke up with  pleuritic right chest pain  which has persisted  no  recent travel or immobilization  no leg pain or swelling

## 2019-01-31 LAB — DEPRECATED D DIMER PPP IA-ACNC: 281 NG/ML DDU

## 2019-02-08 ENCOUNTER — INPATIENT (INPATIENT)
Facility: HOSPITAL | Age: 69
LOS: 5 days | Discharge: HOME | End: 2019-02-14
Attending: HOSPITALIST | Admitting: HOSPITALIST
Payer: MEDICARE

## 2019-02-08 ENCOUNTER — CHART COPY (OUTPATIENT)
Age: 69
End: 2019-02-08

## 2019-02-08 VITALS
SYSTOLIC BLOOD PRESSURE: 148 MMHG | RESPIRATION RATE: 16 BRPM | OXYGEN SATURATION: 100 % | HEART RATE: 71 BPM | TEMPERATURE: 98 F | DIASTOLIC BLOOD PRESSURE: 86 MMHG

## 2019-02-08 DIAGNOSIS — J18.9 PNEUMONIA, UNSPECIFIED ORGANISM: ICD-10-CM

## 2019-02-08 DIAGNOSIS — M06.9 RHEUMATOID ARTHRITIS, UNSPECIFIED: ICD-10-CM

## 2019-02-08 DIAGNOSIS — E04.1 NONTOXIC SINGLE THYROID NODULE: ICD-10-CM

## 2019-02-08 DIAGNOSIS — Z29.9 ENCOUNTER FOR PROPHYLACTIC MEASURES, UNSPECIFIED: ICD-10-CM

## 2019-02-08 DIAGNOSIS — I10 ESSENTIAL (PRIMARY) HYPERTENSION: ICD-10-CM

## 2019-02-08 DIAGNOSIS — J98.4 OTHER DISORDERS OF LUNG: ICD-10-CM

## 2019-02-08 LAB
ALBUMIN SERPL ELPH-MCNC: 3.2 G/DL — LOW (ref 3.3–5)
ALP SERPL-CCNC: 79 U/L — SIGNIFICANT CHANGE UP (ref 40–120)
ALT FLD-CCNC: 18 U/L — SIGNIFICANT CHANGE UP (ref 4–33)
ANION GAP SERPL CALC-SCNC: 18 MMO/L — HIGH (ref 7–14)
AST SERPL-CCNC: 22 U/L — SIGNIFICANT CHANGE UP (ref 4–32)
B PERT DNA SPEC QL NAA+PROBE: NOT DETECTED — SIGNIFICANT CHANGE UP
BASE EXCESS BLDV CALC-SCNC: 4 MMOL/L — SIGNIFICANT CHANGE UP
BASOPHILS # BLD AUTO: 0.04 K/UL — SIGNIFICANT CHANGE UP (ref 0–0.2)
BASOPHILS NFR BLD AUTO: 0.7 % — SIGNIFICANT CHANGE UP (ref 0–2)
BILIRUB SERPL-MCNC: < 0.2 MG/DL — LOW (ref 0.2–1.2)
BLOOD GAS VENOUS - CREATININE: 0.79 MG/DL — SIGNIFICANT CHANGE UP (ref 0.5–1.3)
BUN SERPL-MCNC: 17 MG/DL — SIGNIFICANT CHANGE UP (ref 7–23)
C PNEUM DNA SPEC QL NAA+PROBE: NOT DETECTED — SIGNIFICANT CHANGE UP
CALCIUM SERPL-MCNC: 9.4 MG/DL — SIGNIFICANT CHANGE UP (ref 8.4–10.5)
CHLORIDE BLDV-SCNC: 107 MMOL/L — SIGNIFICANT CHANGE UP (ref 96–108)
CHLORIDE SERPL-SCNC: 101 MMOL/L — SIGNIFICANT CHANGE UP (ref 98–107)
CO2 SERPL-SCNC: 20 MMOL/L — LOW (ref 22–31)
CREAT SERPL-MCNC: 0.83 MG/DL — SIGNIFICANT CHANGE UP (ref 0.5–1.3)
EOSINOPHIL # BLD AUTO: 0.19 K/UL — SIGNIFICANT CHANGE UP (ref 0–0.5)
EOSINOPHIL NFR BLD AUTO: 3.2 % — SIGNIFICANT CHANGE UP (ref 0–6)
FLUAV H1 2009 PAND RNA SPEC QL NAA+PROBE: NOT DETECTED — SIGNIFICANT CHANGE UP
FLUAV H1 RNA SPEC QL NAA+PROBE: NOT DETECTED — SIGNIFICANT CHANGE UP
FLUAV H3 RNA SPEC QL NAA+PROBE: NOT DETECTED — SIGNIFICANT CHANGE UP
FLUAV SUBTYP SPEC NAA+PROBE: NOT DETECTED — SIGNIFICANT CHANGE UP
FLUBV RNA SPEC QL NAA+PROBE: NOT DETECTED — SIGNIFICANT CHANGE UP
GAS PNL BLDV: 140 MMOL/L — SIGNIFICANT CHANGE UP (ref 136–146)
GLUCOSE BLDV-MCNC: 109 — HIGH (ref 70–99)
GLUCOSE SERPL-MCNC: 104 MG/DL — HIGH (ref 70–99)
HADV DNA SPEC QL NAA+PROBE: NOT DETECTED — SIGNIFICANT CHANGE UP
HCO3 BLDV-SCNC: 27 MMOL/L — SIGNIFICANT CHANGE UP (ref 20–27)
HCOV PNL SPEC NAA+PROBE: SIGNIFICANT CHANGE UP
HCT VFR BLD CALC: 34.7 % — SIGNIFICANT CHANGE UP (ref 34.5–45)
HCT VFR BLDV CALC: 32.9 % — LOW (ref 34.5–45)
HGB BLD-MCNC: 10.9 G/DL — LOW (ref 11.5–15.5)
HGB BLDV-MCNC: 10.7 G/DL — LOW (ref 11.5–15.5)
HMPV RNA SPEC QL NAA+PROBE: NOT DETECTED — SIGNIFICANT CHANGE UP
HPIV1 RNA SPEC QL NAA+PROBE: NOT DETECTED — SIGNIFICANT CHANGE UP
HPIV2 RNA SPEC QL NAA+PROBE: NOT DETECTED — SIGNIFICANT CHANGE UP
HPIV3 RNA SPEC QL NAA+PROBE: NOT DETECTED — SIGNIFICANT CHANGE UP
HPIV4 RNA SPEC QL NAA+PROBE: NOT DETECTED — SIGNIFICANT CHANGE UP
IMM GRANULOCYTES NFR BLD AUTO: 0.7 % — SIGNIFICANT CHANGE UP (ref 0–1.5)
LACTATE BLDV-MCNC: 0.8 MMOL/L — SIGNIFICANT CHANGE UP (ref 0.5–2)
LYMPHOCYTES # BLD AUTO: 1.54 K/UL — SIGNIFICANT CHANGE UP (ref 1–3.3)
LYMPHOCYTES # BLD AUTO: 26 % — SIGNIFICANT CHANGE UP (ref 13–44)
MCHC RBC-ENTMCNC: 27.6 PG — SIGNIFICANT CHANGE UP (ref 27–34)
MCHC RBC-ENTMCNC: 31.4 % — LOW (ref 32–36)
MCV RBC AUTO: 87.8 FL — SIGNIFICANT CHANGE UP (ref 80–100)
MONOCYTES # BLD AUTO: 0.82 K/UL — SIGNIFICANT CHANGE UP (ref 0–0.9)
MONOCYTES NFR BLD AUTO: 13.9 % — SIGNIFICANT CHANGE UP (ref 2–14)
NEUTROPHILS # BLD AUTO: 3.29 K/UL — SIGNIFICANT CHANGE UP (ref 1.8–7.4)
NEUTROPHILS NFR BLD AUTO: 55.5 % — SIGNIFICANT CHANGE UP (ref 43–77)
NRBC # FLD: 0 K/UL — LOW (ref 25–125)
NT-PROBNP SERPL-SCNC: 58.04 PG/ML — SIGNIFICANT CHANGE UP
PCO2 BLDV: 50 MMHG — SIGNIFICANT CHANGE UP (ref 41–51)
PH BLDV: 7.38 PH — SIGNIFICANT CHANGE UP (ref 7.32–7.43)
PLATELET # BLD AUTO: 418 K/UL — HIGH (ref 150–400)
PMV BLD: 9.4 FL — SIGNIFICANT CHANGE UP (ref 7–13)
PO2 BLDV: 45 MMHG — HIGH (ref 35–40)
POTASSIUM BLDV-SCNC: 4 MMOL/L — SIGNIFICANT CHANGE UP (ref 3.4–4.5)
POTASSIUM SERPL-MCNC: 4.8 MMOL/L — SIGNIFICANT CHANGE UP (ref 3.5–5.3)
POTASSIUM SERPL-SCNC: 4.8 MMOL/L — SIGNIFICANT CHANGE UP (ref 3.5–5.3)
PROT SERPL-MCNC: 7.1 G/DL — SIGNIFICANT CHANGE UP (ref 6–8.3)
RBC # BLD: 3.95 M/UL — SIGNIFICANT CHANGE UP (ref 3.8–5.2)
RBC # FLD: 14.5 % — SIGNIFICANT CHANGE UP (ref 10.3–14.5)
RSV RNA SPEC QL NAA+PROBE: NOT DETECTED — SIGNIFICANT CHANGE UP
RV+EV RNA SPEC QL NAA+PROBE: NOT DETECTED — SIGNIFICANT CHANGE UP
SAO2 % BLDV: 73.8 % — SIGNIFICANT CHANGE UP (ref 60–85)
SODIUM SERPL-SCNC: 139 MMOL/L — SIGNIFICANT CHANGE UP (ref 135–145)
TROPONIN T, HIGH SENSITIVITY: 20 NG/L — SIGNIFICANT CHANGE UP (ref ?–14)
WBC # BLD: 5.92 K/UL — SIGNIFICANT CHANGE UP (ref 3.8–10.5)
WBC # FLD AUTO: 5.92 K/UL — SIGNIFICANT CHANGE UP (ref 3.8–10.5)

## 2019-02-08 PROCEDURE — 71046 X-RAY EXAM CHEST 2 VIEWS: CPT | Mod: 26

## 2019-02-08 PROCEDURE — 99223 1ST HOSP IP/OBS HIGH 75: CPT | Mod: GC

## 2019-02-08 RX ORDER — SODIUM CHLORIDE 9 MG/ML
500 INJECTION INTRAMUSCULAR; INTRAVENOUS; SUBCUTANEOUS ONCE
Qty: 0 | Refills: 0 | Status: COMPLETED | OUTPATIENT
Start: 2019-02-08 | End: 2019-02-08

## 2019-02-08 RX ORDER — AMLODIPINE BESYLATE 2.5 MG/1
10 TABLET ORAL DAILY
Qty: 0 | Refills: 0 | Status: DISCONTINUED | OUTPATIENT
Start: 2019-02-09 | End: 2019-02-14

## 2019-02-08 RX ORDER — LANOLIN ALCOHOL/MO/W.PET/CERES
3 CREAM (GRAM) TOPICAL AT BEDTIME
Qty: 0 | Refills: 0 | Status: DISCONTINUED | OUTPATIENT
Start: 2019-02-08 | End: 2019-02-14

## 2019-02-08 RX ORDER — METOPROLOL TARTRATE 50 MG
50 TABLET ORAL
Qty: 0 | Refills: 0 | Status: DISCONTINUED | OUTPATIENT
Start: 2019-02-09 | End: 2019-02-14

## 2019-02-08 RX ORDER — TRIAMTERENE/HYDROCHLOROTHIAZID 75 MG-50MG
1 TABLET ORAL DAILY
Qty: 0 | Refills: 0 | Status: DISCONTINUED | OUTPATIENT
Start: 2019-02-09 | End: 2019-02-14

## 2019-02-08 RX ORDER — CEFTRIAXONE 500 MG/1
1 INJECTION, POWDER, FOR SOLUTION INTRAMUSCULAR; INTRAVENOUS EVERY 24 HOURS
Qty: 0 | Refills: 0 | Status: DISCONTINUED | OUTPATIENT
Start: 2019-02-09 | End: 2019-02-09

## 2019-02-08 RX ORDER — HEPARIN SODIUM 5000 [USP'U]/ML
5000 INJECTION INTRAVENOUS; SUBCUTANEOUS EVERY 8 HOURS
Qty: 0 | Refills: 0 | Status: DISCONTINUED | OUTPATIENT
Start: 2019-02-08 | End: 2019-02-14

## 2019-02-08 RX ORDER — CEFTRIAXONE 500 MG/1
INJECTION, POWDER, FOR SOLUTION INTRAMUSCULAR; INTRAVENOUS
Qty: 0 | Refills: 0 | Status: DISCONTINUED | OUTPATIENT
Start: 2019-02-08 | End: 2019-02-09

## 2019-02-08 RX ORDER — CEFTRIAXONE 500 MG/1
1 INJECTION, POWDER, FOR SOLUTION INTRAMUSCULAR; INTRAVENOUS ONCE
Qty: 0 | Refills: 0 | Status: COMPLETED | OUTPATIENT
Start: 2019-02-08 | End: 2019-02-08

## 2019-02-08 RX ORDER — INFLUENZA VIRUS VACCINE 15; 15; 15; 15 UG/.5ML; UG/.5ML; UG/.5ML; UG/.5ML
0.5 SUSPENSION INTRAMUSCULAR ONCE
Qty: 0 | Refills: 0 | Status: COMPLETED | OUTPATIENT
Start: 2019-02-08 | End: 2019-02-08

## 2019-02-08 RX ORDER — ACETAMINOPHEN 500 MG
650 TABLET ORAL ONCE
Qty: 0 | Refills: 0 | Status: COMPLETED | OUTPATIENT
Start: 2019-02-08 | End: 2019-02-08

## 2019-02-08 RX ORDER — LISINOPRIL 2.5 MG/1
10 TABLET ORAL DAILY
Qty: 0 | Refills: 0 | Status: DISCONTINUED | OUTPATIENT
Start: 2019-02-09 | End: 2019-02-14

## 2019-02-08 RX ADMIN — Medication 650 MILLIGRAM(S): at 19:14

## 2019-02-08 RX ADMIN — SODIUM CHLORIDE 500 MILLILITER(S): 9 INJECTION INTRAMUSCULAR; INTRAVENOUS; SUBCUTANEOUS at 11:16

## 2019-02-08 RX ADMIN — CEFTRIAXONE 100 GRAM(S): 500 INJECTION, POWDER, FOR SOLUTION INTRAMUSCULAR; INTRAVENOUS at 20:43

## 2019-02-08 NOTE — H&P ADULT - PROBLEM SELECTOR PLAN 3
Holding meds for now in setting of infection, will start if BP elevates C/w home BP meds  -will find clarify if patient took her AM doses today  -Amlodipine 10mg qd  -Benazpril 10mg qd  -Metoprolol succ 100mg qd  -Triamterene-HCTZ 37.5-25mg

## 2019-02-08 NOTE — ED PROVIDER NOTE - MEDICAL DECISION MAKING DETAILS
Elvin PGY2: 68Fhx of RA on immunomodulator, HTN, DVT unclear AC?, presents with a cc of sharp pain fever SOB x10 days, had cxr negative, d-dimer elevated cta c/f mass like round infiltrate at pleural base, started on abx, azithro and Cefpodoxmine, had chest pain though resolved, still c/o cough fever SOB last APAP 11pm no fever in ED, increasing orthopnea, per PMD c/f opportunistic infection. exam vss well appearing non toxic c/f pna c/b failed outpt rx vs uri will get labs cxr trop cardiacs reassess

## 2019-02-08 NOTE — ED ADULT TRIAGE NOTE - CHIEF COMPLAINT QUOTE
pt comes to ED r/o PNA pt has cough fever and SOB x 1 week. pt had a CT scan done and was told she has a "lung infection" pt VSS pt complains of SOB and cough. pt is a febrile. NAD ekg to obtained pt comes to ED r/o PNA pt has cough fever and SOB x 1 week. pt had a CT scan done and was told she has a "lung infection" pt VSS pt complains of SOB and cough. pt is a febrile. NAD ekg to obtained. face mask provided

## 2019-02-08 NOTE — H&P ADULT - NSHPREVIEWOFSYSTEMS_GEN_ALL_CORE
REVIEW OF SYSTEMS:    CONSTITUTIONAL: No weakness  EYES/ENT: No visual changes;  No vertigo or throat pain   NECK: No pain or stiffness  RESPIRATORY: No wheezing, or hemoptysis, no SOB. Had pleuritic CP initially.  CARDIOVASCULAR: No chest pain or palpitations  GASTROINTESTINAL: No abdominal or epigastric pain. No nausea, vomiting, or hematemesis; No diarrhea or constipation. No melena or hematochezia.  GENITOURINARY: No dysuria, frequency or hematuria  NEUROLOGICAL: No numbness or weakness  SKIN: No itching, burning, rashes, or lesions   All other review of systems is negative unless indicated above.

## 2019-02-08 NOTE — H&P ADULT - ATTENDING COMMENTS
Patient seen and examined.  Case discussed with house staff.  Agree with above as edited.  Patient is a 68yoF with h/o RA, HTN, prior provoked DVT, known right thyroid nodules sent to ED by PMD for Outpatient CT with RUL mass. CXR here shows RUL mass with potential cavitation.  RUL mass vs cavitary lesion - ddx includes bacterial PNA, TB, malignancy, rheumatoid nodule. Check CT with contrast. Hold Abx pending CT unless spikes fever.  RA - Rheum eval. Holding for now.  HTN - c/w home regimen

## 2019-02-08 NOTE — ED ADULT NURSE NOTE - CHIEF COMPLAINT QUOTE
pt comes to ED r/o PNA pt has cough fever and SOB x 1 week. pt had a CT scan done and was told she has a "lung infection" pt VSS pt complains of SOB and cough. pt is a febrile. NAD ekg to obtained. face mask provided

## 2019-02-08 NOTE — ED PROVIDER NOTE - OBJECTIVE STATEMENT
68Fhx of RA on immunomodulator, HTN, DVT unclear AC?, presents with a cc of sharp pain fever SOB x10 days, had cxr negative, d-dimer elevated cta c/f mass like round infiltrate at pleural base, started on abx, azithro and Cefpodoxmine, had chest pain though resolved, still c/o cough fever SOB last APAP 11pm no fever in ED, increasing orthopnea, per PMD c/f opportunistic infection. Denies n/v/c/ Denies headache, syncope, lightheadedness, dizziness. Denies chest palpitations, abdominal pain. Denies dysuria, hematuria, hematochezia, BRBPR, tarry stools, diarrhea, constipation. Sick contacts, no flu vaccine.

## 2019-02-08 NOTE — H&P ADULT - ASSESSMENT
67 yo F w/t a PMHx of RA on (Simponi, plaquenil, leflunomide), HTN, provoked DVT, R sided thyroid nodules who p/w a 9 day course of pleuritic CP on inspiration, cough, and night sweats with fever and rigors 2/2 infectious vs malignancy vs RA nodules vs PE (least likely given O2 sat).

## 2019-02-08 NOTE — ED ADULT NURSE REASSESSMENT NOTE - NS ED NURSE REASSESS COMMENT FT1
Break relief RN: Assumed care of pt from Marilu ROBISON Pt placed in Isolation room #7 Pt is alert and oriented x4 well appearing Pt admitted awaiting bed assignment

## 2019-02-08 NOTE — H&P ADULT - PROBLEM SELECTOR PLAN 1
Acute cough, fevers, and night sweats and chills possibly 2/2 infectious causes (TB, staph, opportunistic infections) vs. malginancy vs. rheumatoid nodule (less likely given fever and night sweats) vs PE (least liekly). Could still be viral URI w/t coincidental lung mass.   -repeat CTA in house  -Hold abx pending CT result, will start CTX if CT result not received tonight  -Trend fever  -No leukocytosis, will check wbc in AM  -Patient will have family bring outpatient CTA cd tomorrow  -Isolation precaution

## 2019-02-08 NOTE — ED PROVIDER NOTE - PHYSICAL EXAMINATION
GEN APPEARANCE: WDWN, alert and cooperative, non-toxic appearing and in NAD  HEAD: Atraumatic, normocephalic   EYES: PERRLa, EOMI, vision grossly intact.   EARS: Gross hearing intact.   NOSE: No nasal discharge, no external evidence of epistaxis.   NECK: Supple  CV: RRR, S1S2, no c/r/m/g. No cyanosis or pallor. Extremities warm, well perfused. Cap refill <2 seconds. No bruits.   LUNGS: CTAB. No wheezing. No rales. No rhonchi. No diminished breath sounds.   ABDOMEN: Soft, NTND. No guarding or rebound. No masses.   MSK: Spine appears normal, no spine point tenderness. No CVA ttp. No joint erythema or tenderness. Normal muscular development. Pelvis stable.  EXTREMITIES: No peripheral edema. No obvious joint or bony deformity.  NEURO: Alert, follows commands. Weight bearing normal. Speech normal. Sensation and motor normal x4 extremities.   SKIN: Normal color for race, warm, dry and intact. No evidence of rash.  PSYCH: Normal mood and affect.

## 2019-02-08 NOTE — H&P ADULT - NSHPSOCIALHISTORY_GEN_ALL_CORE
Lives with her  and granddaughter. Retired from job as x-ray tech, now works part time in retail. Smoked 1ppd for 1-2 years prior to getting pregnancy and then quit 43yrs ago. No EtOH or recreational drugs.

## 2019-02-08 NOTE — ED PROVIDER NOTE - PSH
S/P arthroscopic knee surgery    S/P carpal tunnel release    S/P colonoscopy    S/P cubital tunnel release  bilateral about 5 years ago.  S/P lumpectomy of breast    S/P myomectomy  about 25 years ago.

## 2019-02-08 NOTE — H&P ADULT - NSHPPHYSICALEXAM_GEN_ALL_CORE
PHYSICAL EXAM:  GENERAL: NAD, well-developed  HEAD:  Atraumatic, Normocephalic  EYES: EOMI, PERRLA, conjunctiva and sclera clear  NECK: Supple, No JVD  CHEST/LUNG: Clear to auscultation bilaterally; No wheeze  HEART: NL s1s2, regular rate and rhythm; No murmurs, rubs, or gallops  ABDOMEN: Soft, Nontender, Nondistended; Bowel sounds present  EXTREMITIES:  2+ Peripheral Pulses, No clubbing, cyanosis, or edema  PSYCH: AAOx3  NEUROLOGY: non-focal  SKIN: No rashes or lesions

## 2019-02-08 NOTE — H&P ADULT - NSHPLABSRESULTS_GEN_ALL_CORE
10.9   5.92  )-----------( 418      ( 08 Feb 2019 10:28 )             34.7       02-08    139  |  101  |  17  ----------------------------<  104<H>  4.8   |  20<L>  |  0.83    Ca    9.4      08 Feb 2019 10:28    TPro  7.1  /  Alb  3.2<L>  /  TBili  < 0.2<L>  /  DBili  x   /  AST  22  /  ALT  18  /  AlkPhos  79  02-08            Blood Gas Venous Comprehensive (02.08.19 @ 11:49)    Blood Gas Venous - Lactate: 0.8: Please note updated reference range. mmol/L    Blood Gas Venous - Chloride: 107 mmol/L    Blood Gas Venous - Creatinine: 0.79 mg/dL    pH, Venous: 7.38 pH    pCO2, Venous: 50 mmHg    pO2, Venous: 45 mmHg    HCO3, Venous: 27 mmol/L    Base Excess, Venous: 4.0: REFERENCE RANGE = -3 + 2 mmol/L mmol/L    Oxygen Saturation, Venous: 73.8 %    Blood Gas Venous - Sodium: 140 mmol/L    Blood Gas Venous - Potassium: 4.0 mmol/L    Blood Gas Venous - Glucose: 109    Blood Gas Venous - Hemoglobin: 10.7 g/dL    Blood Gas Venous - Hematocrit: 32.9 %    < from: Xray Chest 2 Views PA/Lat (02.08.19 @ 11:02) >    FINDINGS: 3 cm rounded opacity is seen in the right upper lobe that may   be cavitated. CT would be more sensitive. Remainder of both lungs are   clear. The heart is not enlarged and there are no effusions. Differential   includes infectious, neoplastic and septic embolus.    COMPARISON: May 22, 2013        IMPRESSION: Right upper lobe mass of uncertain etiology that would be   better evaluated by means of CT.    < end of copied text >

## 2019-02-08 NOTE — H&P ADULT - HISTORY OF PRESENT ILLNESS
67 yo F w/t a PMHx of RA on im 67 yo F w/t a PMHx of RA on (Simponi, plaquenil, leflunomide), HTN, provoked DVT, R sided thyroid nodules who p/w a 9 day course of pleuritic CP on inspiration, cough, and night sweats with fever and rigors. Of note the symptoms started with just pain on inspiration and then the cough, night sweats, and fever developed. The cough is non-productive (no hemoptysis). She notes a negative TB screen many years ago when she first started immunomodulatory therapy. Denies recent travel, notes that her granddaughter w/t hidradenitis suppurativa started immunosuppressive therapy and has been sick recently. Of note, she saw her PCP on 1/30/19 who ordered a d-dimer which was positive and CXR which was read as clear. Her symptoms did not improve so she was started on cefpodoxime and azithromycin on Mon 2/4/19. She continued to have cough, night sweats, and fevers in spite of antibiotics so a chest CTA was ordered which, per allscripts revealed a pleural based soft tissue density w/t ground glass halo surrounding it in the RUL (2.7cm x 2.7cm x 3.3cm) so he called her and told her to come to the ED. No recent travel. Born in [place]. She denies N/V and diarrhea. No dysuria.    ED Course:  Vitals   T 97.6  HR 71 /86  RR 18  SpO2 100%  In the ED, CXR was repeated and was read as a possible cavitary lesion in the RUL. He was given 750mg levofloxacin x1, and 500 NS bolus. No leukocytosis. RVP neg. She was then admitted to medicine and put on isolation precautions to r/o TB.

## 2019-02-08 NOTE — PATIENT PROFILE ADULT - STATED REASON FOR ADMISSION
"doctor sent me to the hospital. was being treated for lung infection but was not responding to the antibiotics and doctor felt needed IV antibiotics, so suggested  she come to the ED"

## 2019-02-08 NOTE — ED ADULT NURSE NOTE - OBJECTIVE STATEMENT
Pt rcvd to room 29 c/o R sided CP and SOB. States shes been having a cough and low grade fever. Had 2 cxr with neg findings. Had 1 CT 1 weeks ago and told "I have an infection in my lungs." Pt unsure of what type of infection. Currently denies CP but having some SOB. Respirations even/unlabored. Pt is Aox3, ambulatory. Skin intact. Hx: Arthritis and HTN. Afebrile. Evaluated by MD. Labs drawn & sent. 20g IV placed to R ac. Will continue to monitor.

## 2019-02-08 NOTE — ED PROVIDER NOTE - ATTENDING CONTRIBUTION TO CARE
Pt was seen and evaluated by me. Pt is a 69 y/o female with PMHx of HTN and RA on immuno-modulators who presented to the ED for cough, fever, and SOB X 10 days. Pt states over the past 10 days having a non-productive cough. Pt admits to fever, last episodes yesterday. Pt had a CXR that was negative but her PMD sent pt for CT. On CT it was noted pt had a finding on RUL. Pt was sent in my PMD with concern pt has been on antibiotics and still having fever with cough. Pt denies any nausea, vomiting, or abd pain. Admits to chest pain with cough. Lungs CTA b/l. RRR. Abd soft, non-tender.

## 2019-02-09 LAB
ALBUMIN SERPL ELPH-MCNC: 3.4 G/DL — SIGNIFICANT CHANGE UP (ref 3.3–5)
ALP SERPL-CCNC: 75 U/L — SIGNIFICANT CHANGE UP (ref 40–120)
ALT FLD-CCNC: 17 U/L — SIGNIFICANT CHANGE UP (ref 4–33)
ANION GAP SERPL CALC-SCNC: 13 MMO/L — SIGNIFICANT CHANGE UP (ref 7–14)
AST SERPL-CCNC: 14 U/L — SIGNIFICANT CHANGE UP (ref 4–32)
BASE EXCESS BLDV CALC-SCNC: 3.4 MMOL/L — SIGNIFICANT CHANGE UP
BASOPHILS # BLD AUTO: 0.04 K/UL — SIGNIFICANT CHANGE UP (ref 0–0.2)
BASOPHILS NFR BLD AUTO: 0.5 % — SIGNIFICANT CHANGE UP (ref 0–2)
BILIRUB SERPL-MCNC: < 0.2 MG/DL — LOW (ref 0.2–1.2)
BLOOD GAS VENOUS - CREATININE: 0.77 MG/DL — SIGNIFICANT CHANGE UP (ref 0.5–1.3)
BUN SERPL-MCNC: 11 MG/DL — SIGNIFICANT CHANGE UP (ref 7–23)
CALCIUM SERPL-MCNC: 9.4 MG/DL — SIGNIFICANT CHANGE UP (ref 8.4–10.5)
CHLORIDE BLDV-SCNC: 105 MMOL/L — SIGNIFICANT CHANGE UP (ref 96–108)
CHLORIDE SERPL-SCNC: 98 MMOL/L — SIGNIFICANT CHANGE UP (ref 98–107)
CO2 SERPL-SCNC: 26 MMOL/L — SIGNIFICANT CHANGE UP (ref 22–31)
CREAT SERPL-MCNC: 0.81 MG/DL — SIGNIFICANT CHANGE UP (ref 0.5–1.3)
EOSINOPHIL # BLD AUTO: 0.11 K/UL — SIGNIFICANT CHANGE UP (ref 0–0.5)
EOSINOPHIL NFR BLD AUTO: 1.4 % — SIGNIFICANT CHANGE UP (ref 0–6)
GAS PNL BLDV: 134 MMOL/L — LOW (ref 136–146)
GLUCOSE BLDV-MCNC: 100 — HIGH (ref 70–99)
GLUCOSE SERPL-MCNC: 98 MG/DL — SIGNIFICANT CHANGE UP (ref 70–99)
HCO3 BLDV-SCNC: 27 MMOL/L — SIGNIFICANT CHANGE UP (ref 20–27)
HCT VFR BLD CALC: 33.9 % — LOW (ref 34.5–45)
HCT VFR BLDV CALC: 31.3 % — LOW (ref 34.5–45)
HGB BLD-MCNC: 10.4 G/DL — LOW (ref 11.5–15.5)
HGB BLDV-MCNC: 10.1 G/DL — LOW (ref 11.5–15.5)
IMM GRANULOCYTES NFR BLD AUTO: 0.8 % — SIGNIFICANT CHANGE UP (ref 0–1.5)
LACTATE BLDV-MCNC: 0.8 MMOL/L — SIGNIFICANT CHANGE UP (ref 0.5–2)
LYMPHOCYTES # BLD AUTO: 1.78 K/UL — SIGNIFICANT CHANGE UP (ref 1–3.3)
LYMPHOCYTES # BLD AUTO: 22.8 % — SIGNIFICANT CHANGE UP (ref 13–44)
MAGNESIUM SERPL-MCNC: 2 MG/DL — SIGNIFICANT CHANGE UP (ref 1.6–2.6)
MCHC RBC-ENTMCNC: 27.4 PG — SIGNIFICANT CHANGE UP (ref 27–34)
MCHC RBC-ENTMCNC: 30.7 % — LOW (ref 32–36)
MCV RBC AUTO: 89.4 FL — SIGNIFICANT CHANGE UP (ref 80–100)
MONOCYTES # BLD AUTO: 0.95 K/UL — HIGH (ref 0–0.9)
MONOCYTES NFR BLD AUTO: 12.1 % — SIGNIFICANT CHANGE UP (ref 2–14)
NEUTROPHILS # BLD AUTO: 4.88 K/UL — SIGNIFICANT CHANGE UP (ref 1.8–7.4)
NEUTROPHILS NFR BLD AUTO: 62.4 % — SIGNIFICANT CHANGE UP (ref 43–77)
NRBC # FLD: 0 K/UL — LOW (ref 25–125)
PCO2 BLDV: 43 MMHG — SIGNIFICANT CHANGE UP (ref 41–51)
PH BLDV: 7.43 PH — SIGNIFICANT CHANGE UP (ref 7.32–7.43)
PHOSPHATE SERPL-MCNC: 3 MG/DL — SIGNIFICANT CHANGE UP (ref 2.5–4.5)
PLATELET # BLD AUTO: 405 K/UL — HIGH (ref 150–400)
PMV BLD: 9.4 FL — SIGNIFICANT CHANGE UP (ref 7–13)
PO2 BLDV: 81 MMHG — HIGH (ref 35–40)
POTASSIUM BLDV-SCNC: 3.3 MMOL/L — LOW (ref 3.4–4.5)
POTASSIUM SERPL-MCNC: 3.6 MMOL/L — SIGNIFICANT CHANGE UP (ref 3.5–5.3)
POTASSIUM SERPL-SCNC: 3.6 MMOL/L — SIGNIFICANT CHANGE UP (ref 3.5–5.3)
PROT SERPL-MCNC: 6.7 G/DL — SIGNIFICANT CHANGE UP (ref 6–8.3)
RBC # BLD: 3.79 M/UL — LOW (ref 3.8–5.2)
RBC # FLD: 14.5 % — SIGNIFICANT CHANGE UP (ref 10.3–14.5)
SAO2 % BLDV: 94.7 % — HIGH (ref 60–85)
SODIUM SERPL-SCNC: 137 MMOL/L — SIGNIFICANT CHANGE UP (ref 135–145)
SPECIMEN SOURCE: SIGNIFICANT CHANGE UP
SPECIMEN SOURCE: SIGNIFICANT CHANGE UP
WBC # BLD: 7.82 K/UL — SIGNIFICANT CHANGE UP (ref 3.8–10.5)
WBC # FLD AUTO: 7.82 K/UL — SIGNIFICANT CHANGE UP (ref 3.8–10.5)

## 2019-02-09 PROCEDURE — 99223 1ST HOSP IP/OBS HIGH 75: CPT

## 2019-02-09 PROCEDURE — 99222 1ST HOSP IP/OBS MODERATE 55: CPT | Mod: GC

## 2019-02-09 PROCEDURE — 71275 CT ANGIOGRAPHY CHEST: CPT | Mod: 26

## 2019-02-09 PROCEDURE — 99223 1ST HOSP IP/OBS HIGH 75: CPT | Mod: GC

## 2019-02-09 PROCEDURE — 99233 SBSQ HOSP IP/OBS HIGH 50: CPT | Mod: GC

## 2019-02-09 RX ORDER — PIPERACILLIN AND TAZOBACTAM 4; .5 G/20ML; G/20ML
3.38 INJECTION, POWDER, LYOPHILIZED, FOR SOLUTION INTRAVENOUS EVERY 8 HOURS
Qty: 0 | Refills: 0 | Status: DISCONTINUED | OUTPATIENT
Start: 2019-02-09 | End: 2019-02-14

## 2019-02-09 RX ORDER — LANOLIN ALCOHOL/MO/W.PET/CERES
3 CREAM (GRAM) TOPICAL ONCE
Qty: 0 | Refills: 0 | Status: COMPLETED | OUTPATIENT
Start: 2019-02-09 | End: 2019-02-09

## 2019-02-09 RX ORDER — VANCOMYCIN HCL 1 G
1250 VIAL (EA) INTRAVENOUS ONCE
Qty: 0 | Refills: 0 | Status: COMPLETED | OUTPATIENT
Start: 2019-02-09 | End: 2019-02-09

## 2019-02-09 RX ORDER — SODIUM CHLORIDE 9 MG/ML
4 INJECTION INTRAMUSCULAR; INTRAVENOUS; SUBCUTANEOUS EVERY 6 HOURS
Qty: 0 | Refills: 0 | Status: DISCONTINUED | OUTPATIENT
Start: 2019-02-09 | End: 2019-02-09

## 2019-02-09 RX ORDER — SODIUM CHLORIDE 9 MG/ML
4 INJECTION INTRAMUSCULAR; INTRAVENOUS; SUBCUTANEOUS EVERY 8 HOURS
Qty: 0 | Refills: 0 | Status: DISCONTINUED | OUTPATIENT
Start: 2019-02-09 | End: 2019-02-14

## 2019-02-09 RX ORDER — ACETAMINOPHEN 500 MG
650 TABLET ORAL ONCE
Qty: 0 | Refills: 0 | Status: COMPLETED | OUTPATIENT
Start: 2019-02-09 | End: 2019-02-09

## 2019-02-09 RX ORDER — PIPERACILLIN AND TAZOBACTAM 4; .5 G/20ML; G/20ML
3.38 INJECTION, POWDER, LYOPHILIZED, FOR SOLUTION INTRAVENOUS ONCE
Qty: 0 | Refills: 0 | Status: COMPLETED | OUTPATIENT
Start: 2019-02-09 | End: 2019-02-09

## 2019-02-09 RX ORDER — LIDOCAINE 4 G/100G
1 CREAM TOPICAL EVERY 24 HOURS
Qty: 0 | Refills: 0 | Status: DISCONTINUED | OUTPATIENT
Start: 2019-02-09 | End: 2019-02-14

## 2019-02-09 RX ADMIN — Medication 50 MILLIGRAM(S): at 18:11

## 2019-02-09 RX ADMIN — Medication 3 MILLIGRAM(S): at 21:04

## 2019-02-09 RX ADMIN — PIPERACILLIN AND TAZOBACTAM 200 GRAM(S): 4; .5 INJECTION, POWDER, LYOPHILIZED, FOR SOLUTION INTRAVENOUS at 11:34

## 2019-02-09 RX ADMIN — AMLODIPINE BESYLATE 10 MILLIGRAM(S): 2.5 TABLET ORAL at 05:30

## 2019-02-09 RX ADMIN — LISINOPRIL 10 MILLIGRAM(S): 2.5 TABLET ORAL at 05:30

## 2019-02-09 RX ADMIN — Medication 650 MILLIGRAM(S): at 21:40

## 2019-02-09 RX ADMIN — Medication 500 MILLIGRAM(S): at 23:41

## 2019-02-09 RX ADMIN — Medication 1 TABLET(S): at 05:58

## 2019-02-09 RX ADMIN — Medication 650 MILLIGRAM(S): at 21:04

## 2019-02-09 RX ADMIN — LIDOCAINE 1 PATCH: 4 CREAM TOPICAL at 09:01

## 2019-02-09 RX ADMIN — Medication 50 MILLIGRAM(S): at 05:30

## 2019-02-09 RX ADMIN — Medication 100 MILLIGRAM(S): at 22:38

## 2019-02-09 RX ADMIN — Medication 3 MILLIGRAM(S): at 01:23

## 2019-02-09 RX ADMIN — LIDOCAINE 1 PATCH: 4 CREAM TOPICAL at 21:06

## 2019-02-09 RX ADMIN — PIPERACILLIN AND TAZOBACTAM 25 GRAM(S): 4; .5 INJECTION, POWDER, LYOPHILIZED, FOR SOLUTION INTRAVENOUS at 18:10

## 2019-02-09 RX ADMIN — LIDOCAINE 1 PATCH: 4 CREAM TOPICAL at 19:00

## 2019-02-09 RX ADMIN — SODIUM CHLORIDE 4 MILLILITER(S): 9 INJECTION INTRAMUSCULAR; INTRAVENOUS; SUBCUTANEOUS at 14:22

## 2019-02-09 RX ADMIN — Medication 250 MILLIGRAM(S): at 03:23

## 2019-02-09 NOTE — CONSULT NOTE ADULT - ASSESSMENT
67 yo F w/t a PMHx of RA on (Golimumab, plaquenil, leflunomide), HTN, provoked DVT, R sided thyroid nodules who p/w a 9 day course of pleuritic CP on inspiration, cough, and night sweats with fever and rigors 2/2 infectious vs malignancy vs RA nodules vs PE.   CT chest showing cavitary right upper lobe lesion.   Ddx include TB vs Invasive fungal infections, including histoplasmosis, coccidioidomycosis, candidiasis, aspergillosis, blastomycosis, and pneumocystosis which have been described with the use of golimumab. 69 yo F w/t a PMHx of RA on (Golimumab, plaquenil, leflunomide), HTN, provoked DVT, R sided thyroid nodules who p/w a 9 day course of pleuritic CP on inspiration, cough, and night sweats with fever and rigors 2/2 infectious vs malignancy vs RA nodules vs PE.   CT chest showing cavitary right upper lobe lesion. PE ruled out outpatient with CTA.   She has no risk factors for TB as she was born and raised in [place] and only lived in [facility] for a few years. No known exposures to TB, no family members with TB, never been incarcerated or worked for the assisted system. She is a retired xray technician.  Ddx include TB vs Invasive fungal infections, including histoplasmosis, coccidioidomycosis, candidiasis, aspergillosis, blastomycosis, and pneumocystosis which have been described with the use of golimumab. Furthermore, leflunomide can also increase risk of infections such as TB, aspergillosis and PJP.     Recommendations:   1. 69 yo F w/t a PMHx of RA on (Golimumab, plaquenil, leflunomide), HTN, provoked DVT, R sided thyroid nodules who p/w a 9 day course of pleuritic CP on inspiration, cough, and night sweats with fever and rigors 2/2 infectious vs malignancy vs RA nodules vs PE.   CT chest showing cavitary right upper lobe lesion. PE ruled out outpatient with CTA.   She has no risk factors for TB as she was born and raised in [place] and only lived in [facility] for a few years. No known exposures to TB, no family members with TB, never been incarcerated or worked for the [facility] system. She is a retired xray technician.  Ddx include TB vs Invasive fungal infections, including histoplasmosis, coccidioidomycosis, candidiasis, aspergillosis, blastomycosis, and pneumocystosis which have been described with the use of golimumab. Furthermore, leflunomide can also increase risk of infections such as TB, aspergillosis and PJP.     Recommendations:   1.	Continue Piperacillin/tazobactam 3.375 grams every 8 hours   2.	Sputum AFB x3 q8hrs with one being in the AM  3.	obtain sputum gram stain and bacterial culture if feasible  4.	send HIV, ESR, CRP, Fungitell,   5.	send Galactomannan antigen, crypt serum antigen, Histoplasma urine Ag, cocci ab    Discussed w/primary team 67 yo F w/t a PMHx of RA on (Golimumab, plaquenil, leflunomide), HTN, provoked DVT, R sided thyroid nodules who p/w a 9 day course of pleuritic CP on inspiration, cough, and night sweats with fever and rigors 2/2 infectious vs malignancy vs RA nodules vs PE.   CT chest showing cavitary right upper lobe lesion. PE ruled out outpatient with CTA.   She has no risk factors for TB as she was born and raised in [place] and only lived in [facility] for a few years. No known exposures to TB, no family members with TB, never been incarcerated or worked for the [facility] system. She is a retired xray technician.  Ddx include TB vs Invasive fungal infections, including histoplasmosis, coccidioidomycosis, candidiasis, aspergillosis, and pneumocystosis which have been described with the use of golimumab. Furthermore, leflunomide can also increase risk of infections such as TB, aspergillosis and PJP.     Recommendations:   1.	Continue Piperacillin/tazobactam 3.375 grams every 8 hours   2.	Sputum AFB x3 q8hrs with one being in the AM  3.	obtain sputum gram stain and bacterial culture if feasible  4.	send HIV, ESR, CRP, Fungitell, LDH  5.	send Galactomannan antigen, crypt serum antigen, Histoplasma urine Ag, cocci ab  6.	Rheum on board.     Discussed w/primary team

## 2019-02-09 NOTE — CONSULT NOTE ADULT - REASON FOR ADMISSION
Cough, fevers, sweats, and chills a/w RUL cavitary mass

## 2019-02-09 NOTE — CONSULT NOTE ADULT - SUBJECTIVE AND OBJECTIVE BOX
CHIEF COMPLAINT:  cough, subjective fevers    HPI:    68F with RA on a TNF-alpha inhibitor, leflunamide and plaquenil, now here with 9 days of subjective fevers, cough, chills.   Had outpatient visit and treatment with cefpodoxime and azithromycin but still symptomatic.     Has had negative quant gold since 2014  no sick contacts    former smoker - quit 40 years ago  Retired Xray tech  no international travel recently      PAST MEDICAL & SURGICAL HISTORY:  Carpal tunnel syndrome  Osteoarthritis  Hypertension  S/P cubital tunnel release: bilateral about 5 years ago.  S/P myomectomy: about 25 years ago.  S/P carpal tunnel release  S/P colonoscopy  S/P lumpectomy of breast  S/P arthroscopic knee surgery      FAMILY HISTORY:      SOCIAL HISTORY:  Smoking: [ ] Never Smoked [ X] Former Smoker  Substance Use: [ ] Never Used [ ] Used ____  EtOH Use:  Marital Status: [ ] Single [X ]  [ ]  [ ]   Sexual History:   Occupation: xray tech  Recent Travel:  Country of Birth:  Advance Directives:    Allergies    adhesives (Blisters)  latex (Blisters)  Persantine (Blisters)    Intolerances        HOME MEDICATIONS:  Home Medications:  amLODIPine 10 mg oral tablet: 1 tab(s) orally once a day (08 Feb 2019 12:39)  benazepril 10 mg oral tablet: 1 tab(s) orally once a day (08 Feb 2019 12:39)  gabapentin 300 mg oral capsule: 1 cap(s) orally once a day, As Needed (08 Feb 2019 12:39)  hydrocodone-acetaminophen 10 mg-325 mg oral tablet: 1 tab(s) orally every 6 hours, As Needed (08 Feb 2019 12:39)  leflunomide 20 mg oral tablet: 1 tab(s) orally once a day (08 Feb 2019 12:39)  metoprolol succinate 100 mg oral tablet, extended release: 1 tab(s) orally once a day (08 Feb 2019 12:39)  Plaquenil 200 mg oral tablet: 1 tab(s) orally once a day (08 Feb 2019 12:39)  triamterene-hydrochlorothiazide 37.5 mg-25 mg oral tablet: 1 tab(s) orally once a day (08 Feb 2019 12:39)      REVIEW OF SYSTEMS:  Constitutional: [ ] negative [ X] fevers [X ] chills [ ] weight loss [ ] weight gain  HEENT: [X ] negative [ ] dry eyes [ ] eye irritation [ ] postnasal drip [ ] nasal congestion  CV: X[ ] negative  [ ] chest pain [ ] orthopnea [ ] palpitations [ ] murmur  Resp: [ ] negative [ X] cough [ ] shortness of breath [ ] dyspnea [ ] wheezing [ ] sputum [ ] hemoptysis  GI: [X ] negative [ ] nausea [ ] vomiting [ ] diarrhea [ ] constipation [ ] abd pain [ ] dysphagia   : [X ] negative [ ] dysuria [ ] nocturia [ ] hematuria [ ] increased urinary frequency  Musculoskeletal: [ ] negative [ ] back pain [ ] myalgias [ ] arthralgias [ ] fracture  Skin: X[ ] negative [ ] rash [ ] itch  Neurological: [ ] negative [ ] headache [ ] dizziness [ ] syncope [ ] weakness [ ] numbness  Psychiatric: [ X] negative [ ] anxiety [ ] depression  Endocrine: [ ] negative [ ] diabetes [ ] thyroid problem  Hematologic/Lymphatic: [ ] negative [ ] anemia [ ] bleeding problem  Allergic/Immunologic: [ ] negative [ ] itchy eyes [ ] nasal discharge [ ] hives [ ] angioedema  [X ] All other systems negative  [ ] Unable to assess ROS because ________    OBJECTIVE:  ICU Vital Signs Last 24 Hrs  T(C): 36.8 (09 Feb 2019 15:55), Max: 37.4 (09 Feb 2019 04:42)  T(F): 98.2 (09 Feb 2019 15:55), Max: 99.4 (09 Feb 2019 04:42)  HR: 70 (09 Feb 2019 15:55) (65 - 76)  BP: 136/85 (09 Feb 2019 15:55) (136/85 - 150/91)  RR: 19 (09 Feb 2019 15:55) (18 - 19)  SpO2: 99% (09 Feb 2019 15:55) (97% - 100%)        CAPILLARY BLOOD GLUCOSE          PHYSICAL EXAM:  General: well appearing woman in no distress  Respiratory: normal effort on room air  vesicular breath sounds in all fields, no wheezes or crackles  some bronchial in the RUL area  Cardiovascular: rate regular, normal S1 and S2  Neurological: awake, attentive, alert, no deficits    HOSPITAL MEDICATIONS:  Standing Meds:  amLODIPine   Tablet 10 milliGRAM(s) Oral daily  heparin  Injectable 5000 Unit(s) SubCutaneous every 8 hours  influenza   Vaccine 0.5 milliLiter(s) IntraMuscular once  lidocaine   Patch 1 Patch Transdermal every 24 hours  lisinopril 10 milliGRAM(s) Oral daily  melatonin 3 milliGRAM(s) Oral at bedtime  metoprolol tartrate 50 milliGRAM(s) Oral two times a day  piperacillin/tazobactam IVPB. 3.375 Gram(s) IV Intermittent every 8 hours  sodium chloride 3%  Inhalation 4 milliLiter(s) Inhalation every 8 hours  triamterene 37.5 mG/hydrochlorothiazide 25 mG Tablet 1 Tablet(s) Oral daily      PRN Meds:      LABS:                        10.4   7.82  )-----------( 405      ( 09 Feb 2019 06:40 )             33.9     Hgb Trend: 10.4<--, 10.9<--  02-09    137  |  98  |  11  ----------------------------<  98  3.6   |  26  |  0.81    Ca    9.4      09 Feb 2019 06:40  Phos  3.0     02-09  Mg     2.0     02-09    TPro  6.7  /  Alb  3.4  /  TBili  < 0.2<L>  /  DBili  x   /  AST  14  /  ALT  17  /  AlkPhos  75  02-09    Creatinine Trend: 0.81<--, 0.83<--        Venous Blood Gas:  02-09 @ 06:40  7.43/43/81/27/94.7  VBG Lactate: 0.8  Venous Blood Gas:  02-08 @ 11:49  7.38/50/45/27/73.8  VBG Lactate: 0.8      MICROBIOLOGY:     Culture - Blood (collected 08 Feb 2019 13:13)  Source: BLOOD VENOUS  Preliminary Report (09 Feb 2019 13:11):    NO ORGANISMS ISOLATED    NO ORGANISMS ISOLATED AT 24 HOURS    Culture - Blood (collected 08 Feb 2019 13:13)  Source: BLOOD PERIPHERAL  Preliminary Report (09 Feb 2019 13:11):    NO ORGANISMS ISOLATED    NO ORGANISMS ISOLATED AT 24 HOURS        RADIOLOGY:  [ ] Reviewed and interpreted by me    PULMONARY FUNCTION TESTS:    EKG:

## 2019-02-09 NOTE — CONSULT NOTE ADULT - ASSESSMENT
68F with RA on immunosuppresants here with subjective fever/chills and cough for 9 days.   Was treated with cefpodoxime and azithromycin and now found to have cavitary RUL nodule. Likely infectious.     Most likely bacterial infection, fungal also a consideration.   True negative quant gold tests make TB unlikely. No exposures for her to get primary TB in the last 7 months.     Recs  Continue zosyn  collect sputum for bacterial culture and AFB    May need bronchoscopy in the future if unable to produce sputum and not improving on antibiotics.     Will follow up    Jose Reyes MD  Pulmonary/Critical Care Fellow  page: [phone]

## 2019-02-09 NOTE — PROGRESS NOTE ADULT - SUBJECTIVE AND OBJECTIVE BOX
Dr. Isaias Bell   Internal Medicine PGY-1  Pager #[phone]    Patient is a 68y old  Female who presents with a chief complaint of Cough, fevers, sweats, and chills a/w RUL cavitary mass (08 Feb 2019 14:58)      SUBJECTIVE / OVERNIGHT EVENTS:  Continues to have intermittent bouts of cough, non-productive.     MEDICATIONS  (STANDING):  amLODIPine   Tablet 10 milliGRAM(s) Oral daily  cefTRIAXone   IVPB      cefTRIAXone   IVPB 1 Gram(s) IV Intermittent every 24 hours  heparin  Injectable 5000 Unit(s) SubCutaneous every 8 hours  influenza   Vaccine 0.5 milliLiter(s) IntraMuscular once  lidocaine   Patch 1 Patch Transdermal every 24 hours  lisinopril 10 milliGRAM(s) Oral daily  melatonin 3 milliGRAM(s) Oral at bedtime  metoprolol tartrate 50 milliGRAM(s) Oral two times a day  triamterene 37.5 mG/hydrochlorothiazide 25 mG Tablet 1 Tablet(s) Oral daily    MEDICATIONS  (PRN):      Vital Signs Last 24 Hrs  T(C): 37.2 (09 Feb 2019 05:29), Max: 37.4 (09 Feb 2019 04:42)  T(F): 98.9 (09 Feb 2019 05:29), Max: 99.4 (09 Feb 2019 04:42)  HR: 76 (09 Feb 2019 05:29) (63 - 76)  BP: 140/85 (09 Feb 2019 05:29) (140/79 - 150/91)  BP(mean): --  RR: 18 (09 Feb 2019 05:29) (16 - 18)  SpO2: 99% (09 Feb 2019 05:29) (97% - 100%)  CAPILLARY BLOOD GLUCOSE        I&O's Summary      PHYSICAL EXAM:  GENERAL: NAD, well-developed  HEAD:  Atraumatic, Normocephalic  EYES: EOMI, PERRLA, conjunctiva and sclera clear  NECK: Supple, No JVD  CHEST/LUNG: Clear to auscultation bilaterally; No wheeze  HEART: Regular rate and rhythm; No murmurs, rubs, or gallops  ABDOMEN: Soft, Nontender, Nondistended; Bowel sounds present  EXTREMITIES:  2+ Peripheral Pulses, No clubbing, cyanosis, or edema  PSYCH: AAOx3  NEUROLOGY: non-focal  SKIN: No rashes or lesions    LABS:                        10.4   7.82  )-----------( 405      ( 09 Feb 2019 06:40 )             33.9     02-09    137  |  98  |  11  ----------------------------<  98  3.6   |  26  |  0.81    Ca    9.4      09 Feb 2019 06:40  Phos  3.0     02-09  Mg     2.0     02-09    TPro  6.7  /  Alb  3.4  /  TBili  < 0.2<L>  /  DBili  x   /  AST  14  /  ALT  17  /  AlkPhos  75  02-09              RADIOLOGY & ADDITIONAL TESTS:    Imaging Personally Reviewed:    Consultant(s) Notes Reviewed:      Care Discussed with Consultants/Other Providers: Dr. Isaias Bell   Internal Medicine PGY-1  Pager #[phone]    Patient is a 68y old  Female who presents with a chief complaint of Cough, fevers, sweats, and chills a/w RUL cavitary mass (08 Feb 2019 14:58)      SUBJECTIVE / OVERNIGHT EVENTS:  Continues to have intermittent bouts of cough, non-productive. No SOB, ALVAREZ. No N/V or diarrhea. No CP. No fevers, sweats, or chills overnight.     MEDICATIONS  (STANDING):  amLODIPine   Tablet 10 milliGRAM(s) Oral daily  cefTRIAXone   IVPB      cefTRIAXone   IVPB 1 Gram(s) IV Intermittent every 24 hours  heparin  Injectable 5000 Unit(s) SubCutaneous every 8 hours  influenza   Vaccine 0.5 milliLiter(s) IntraMuscular once  lidocaine   Patch 1 Patch Transdermal every 24 hours  lisinopril 10 milliGRAM(s) Oral daily  melatonin 3 milliGRAM(s) Oral at bedtime  metoprolol tartrate 50 milliGRAM(s) Oral two times a day  triamterene 37.5 mG/hydrochlorothiazide 25 mG Tablet 1 Tablet(s) Oral daily    MEDICATIONS  (PRN):      Vital Signs Last 24 Hrs  T(C): 37.2 (09 Feb 2019 05:29), Max: 37.4 (09 Feb 2019 04:42)  T(F): 98.9 (09 Feb 2019 05:29), Max: 99.4 (09 Feb 2019 04:42)  HR: 76 (09 Feb 2019 05:29) (63 - 76)  BP: 140/85 (09 Feb 2019 05:29) (140/79 - 150/91)  BP(mean): --  RR: 18 (09 Feb 2019 05:29) (16 - 18)  SpO2: 99% (09 Feb 2019 05:29) (97% - 100%)  CAPILLARY BLOOD GLUCOSE        I&O's Summary      PHYSICAL EXAM:  GENERAL: NAD, well-developed  HEAD:  Atraumatic, Normocephalic  EYES: EOMI, PERRLA, conjunctiva and sclera clear  NECK: Supple, No JVD  CHEST/LUNG: Clear to auscultation bilaterally; No wheeze  HEART: Regular rate and rhythm; No murmurs, rubs, or gallops  ABDOMEN: Soft, Nontender, Nondistended; Bowel sounds present  EXTREMITIES:  2+ Peripheral Pulses, No clubbing, cyanosis, or edema  PSYCH: AAOx3  NEUROLOGY: non-focal  SKIN: No rashes or lesions    LABS:                        10.4   7.82  )-----------( 405      ( 09 Feb 2019 06:40 )             33.9     02-09    137  |  98  |  11  ----------------------------<  98  3.6   |  26  |  0.81    Ca    9.4      09 Feb 2019 06:40  Phos  3.0     02-09  Mg     2.0     02-09    TPro  6.7  /  Alb  3.4  /  TBili  < 0.2<L>  /  DBili  x   /  AST  14  /  ALT  17  /  AlkPhos  75  02-09              RADIOLOGY & ADDITIONAL TESTS:    Imaging Personally Reviewed:    Consultant(s) Notes Reviewed:      Care Discussed with Consultants/Other Providers:

## 2019-02-09 NOTE — PROGRESS NOTE ADULT - PROBLEM SELECTOR PLAN 1
Acute cough, fevers, and night sweats and chills possibly 2/2 infectious causes (TB, staph, opportunistic infections) vs. rheumatoid nodule vs. malignancy. Could still be viral URI w/t coincidental lung mass. CT confirms clear cavitary lesion.   -ID consult  -Pulm consult  -Switch abx to zosyn  -Trend fever  -fungitell  -Induce sputum culture Q8H using hypertonic saline x3  -Isolation precaution

## 2019-02-09 NOTE — CHART NOTE - NSCHARTNOTEFT_GEN_A_CORE
CT evidence of cavitary lung lesion in the RUL, will keep pt on airborne isolation, ordered acid fast sputum x3, and gave a dose of vancomycin 1250mg IVPB per primary team's instruction.    Will continue to monitor    Chas Garcia  PGY-1, [phone], [phone]

## 2019-02-09 NOTE — PROGRESS NOTE ADULT - PROBLEM SELECTOR PLAN 3
C/w home BP meds  -will find clarify if patient took her AM doses today  -Amlodipine 10mg qd  -Benazpril 10mg qd  -Metoprolol succ 100mg qd  -Triamterene-HCTZ 37.5-25mg

## 2019-02-09 NOTE — CONSULT NOTE ADULT - ATTENDING COMMENTS
RUL cavitary lesion.  Likely infectious. TB is possibility given she is on biologic for RA, though Quantiferon Gold negative in past, most recently 6/2018.  Check sputum culture - may need sputum induction.  Continue Zosyn.  If she is unable to provide a sputum sample, may need bronchoscopy.
note written by attending
Mian Riley  Pager: [phone]. If no response or past 5 pm call [phone].

## 2019-02-09 NOTE — CONSULT NOTE ADULT - SUBJECTIVE AND OBJECTIVE BOX
Patient is a 68y old  Female who presents with a chief complaint of Cough, fevers, sweats, and chills a/w RUL cavitary mass (09 Feb 2019 09:04)      HPI:  69 yo F w/t a PMHx of RA on (Simponi, plaquenil, leflunomide), HTN, provoked DVT, R sided thyroid nodules who p/w a 9 day course of pleuritic CP on inspiration, cough, and night sweats with fever and rigors. Of note the symptoms started with just pain on inspiration and then the cough, night sweats, and fever developed. The cough is non-productive (no hemoptysis). She notes a negative TB screen many years ago when she first started immunomodulatory therapy. Denies recent travel, notes that her granddaughter w/t hidradenitis suppurativa started immunosuppressive therapy and has been sick recently. Of note, she saw her PCP on 1/30/19 who ordered a d-dimer which was positive and CXR which was read as clear. Her symptoms did not improve so she was started on cefpodoxime and azithromycin on Mon 2/4/19. She continued to have cough, night sweats, and fevers in spite of antibiotics so a chest CTA was ordered which, per allscripts revealed a pleural based soft tissue density w/t ground glass halo surrounding it in the RUL (2.7cm x 2.7cm x 3.3cm) so he called her and told her to come to the ED. No recent travel. Born in [place]. She denies N/V and diarrhea. No dysuria.    ED Course:  Vitals   T 97.6  HR 71 /86  RR 18  SpO2 100%  In the ED, CXR was repeated and was read as a possible cavitary lesion in the RUL. He was given 750mg levofloxacin x1, and 500 NS bolus. No leukocytosis. RVP neg. She was then admitted to medicine and put on isolation precautions to r/o TB. (08 Feb 2019 14:58)    Above reviewed. Patient on TNF-alpha inhibitor for RA presents with F/NS/chills found to have a cavitary lesion on chest CT.    ID called for workup/management.       PAST MEDICAL & SURGICAL HISTORY:  Carpal tunnel syndrome  Osteoarthritis  Hypertension  S/P cubital tunnel release: bilateral about 5 years ago.  S/P myomectomy: about 25 years ago.  S/P carpal tunnel release  S/P colonoscopy  S/P lumpectomy of breast  S/P arthroscopic knee surgery      Allergies  adhesives (Blisters)  latex (Blisters)  Persantine (Blisters)        ANTIMICROBIALS:  piperacillin/tazobactam IVPB. 3.375 once  piperacillin/tazobactam IVPB. 3.375 every 8 hours      MEDICATIONS  (STANDING):    cefTRIAXone   IVPB   100 mL/Hr IV Intermittent (02-08-19 @ 20:43)    levoFLOXacin IVPB   50 mL/Hr IV Intermittent (02-08-19 @ 11:56)    vancomycin  IVPB   250 mL/Hr IV Intermittent (02-09-19 @ 03:23)        OTHER MEDS: MEDICATIONS  (STANDING):  amLODIPine   Tablet 10 daily  heparin  Injectable 5000 every 8 hours  influenza   Vaccine 0.5 once  lisinopril 10 daily  melatonin 3 at bedtime  metoprolol tartrate 50 two times a day  sodium chloride 3%  Inhalation 4 every 6 hours  triamterene 37.5 mG/hydrochlorothiazide 25 mG Tablet 1 daily      SOCIAL HISTORY:     Lives with her  and granddaughter. Retired from job as x-ray tech, now works part time in retail. Smoked 1ppd for 1-2 years prior to getting pregnancy and then quit 43yrs ago. No EtOH or recreational drugs    FAMILY HISTORY:      REVIEW OF SYSTEMS  [  ] ROS unobtainable because:    [ X ] All other systems negative except as noted below:	    Constitutional:  [X ] fever [X ] chills  [ ] weight loss  [ X] night sweats   Skin:  [ ] rash [ ] phlebitis	  Eyes: [ ] icterus [ ] pain  [ ] discharge	  ENMT: [ ] sore throat  [ ] thrush [ ] ulcers [ ] exudates  Respiratory: [ ] dyspnea [ ] hemoptysis [ ] cough [ ] sputum	  Cardiovascular:  [ ] chest pain [ ] palpitations [ ] edema	  Gastrointestinal:  [ ] nausea [ ] vomiting [ ] diarrhea [ ] constipation [ ] pain	  Genitourinary:  [ ] dysuria [ ] frequency [ ] hematuria [ ] discharge [ ] flank pain  [ ] incontinence  Musculoskeletal:  [ ] myalgias [ ] arthralgias [ ] arthritis  [ ] back pain  Neurological:  [ ] headache [ ] seizures  [ ] confusion/altered mental status  Psychiatric:  [ ] anxiety [ ] depression	  Hematology/Lymphatics:  [ ] lymphadenopathy  Endocrine:  [ ] adrenal [ ] thyroid  Allergic/Immunologic:	 [ ] transplant [ ] seasonal    Vital Signs Last 24 Hrs  T(F): 98.9 (02-09-19 @ 05:29), Max: 99.4 (02-09-19 @ 04:42)    Vital Signs Last 24 Hrs  HR: 76 (02-09-19 @ 05:29) (63 - 76)  BP: 140/85 (02-09-19 @ 05:29) (140/79 - 150/91)  RR: 18 (02-09-19 @ 05:29)  SpO2: 99% (02-09-19 @ 05:29) (97% - 100%)  Wt(kg): --    PHYSICAL EXAM:  General: non-toxic  HEAD/EYES: anicteric, PERRL  ENT:  supple  Cardiovascular:   S1, S2  Respiratory:  clear bilaterally  GI:  soft, non-tender, normal bowel sounds  :  no CVA tenderness   Musculoskeletal:  no synovitis  Neurologic:  grossly non-focal  Skin:  no rash  Lymph: no lymphadenopathy  Psychiatric:  appropriate affect  Vascular:  no phlebitis          WBC Count: 7.82 K/uL (02-09 @ 06:40)  WBC Count: 5.92 K/uL (02-08 @ 10:28)                            10.4   7.82  )-----------( 405      ( 09 Feb 2019 06:40 )             33.9       02-09    137  |  98  |  11  ----------------------------<  98  3.6   |  26  |  0.81    Ca    9.4      09 Feb 2019 06:40  Phos  3.0     02-09  Mg     2.0     02-09    TPro  6.7  /  Alb  3.4  /  TBili  < 0.2<L>  /  DBili  x   /  AST  14  /  ALT  17  /  AlkPhos  75  02-09      Creatinine Trend: 0.81<--, 0.83<--        MICROBIOLOGY:          v            RADIOLOGY:  < from: Xray Chest 2 Views PA/Lat (02.08.19 @ 11:02) >  IMPRESSION: Right upper lobe mass of uncertain etiology that would be   better evaluated by means of CT.    < end of copied text >    < from: CT Angio Chest w/ IV Cont (02.09.19 @ 00:15) >  IMPRESSION:   No pulmonary embolism.    Cavitary right upper lobe lesion, which is likely   infectious/inflammatory, less likely malignancy. Tuberculosis is a   consideration. Three-month follow-up CT recommended for complete   evaluation.    < end of copied text > Patient is a 68y old  Female who presents with a chief complaint of Cough, fevers, sweats, and chills a/w RUL cavitary mass (09 Feb 2019 09:04)      HPI:  67 yo F w/t a PMHx of RA on (Simponi, plaquenil, leflunomide), HTN, provoked DVT, R sided thyroid nodules who p/w a 9 day course of pleuritic CP on inspiration, cough, and night sweats with fever and rigors. Of note the symptoms started with just pain on inspiration and then the cough, night sweats, and fever developed. The cough is non-productive (no hemoptysis). She notes a negative TB screen many years ago when she first started immunomodulatory therapy. Denies recent travel, notes that her granddaughter w/t hidradenitis suppurativa started immunosuppressive therapy and has been sick recently. Of note, she saw her PCP on 1/30/19 who ordered a d-dimer which was positive and CXR which was read as clear. Her symptoms did not improve so she was started on cefpodoxime and azithromycin on Mon 2/4/19. She continued to have cough, night sweats, and fevers in spite of antibiotics so a chest CTA was ordered which, per allscripts revealed a pleural based soft tissue density w/t ground glass halo surrounding it in the RUL (2.7cm x 2.7cm x 3.3cm) so he called her and told her to come to the ED. No recent travel. Born in [place]. She denies N/V and diarrhea. No dysuria.    ED Course:  Vitals   T 97.6  HR 71 /86  RR 18  SpO2 100%  In the ED, CXR was repeated and was read as a possible cavitary lesion in the RUL. He was given 750mg levofloxacin x1, and 500 NS bolus. No leukocytosis. RVP neg. She was then admitted to medicine and put on isolation precautions to r/o TB. (08 Feb 2019 14:58)    Above reviewed. Patient started new TNF-alpha inhibitor 1month ago for RA presents with F/NS/chills found to have a cavitary lesion on chest CT.    ID called for workup/management.       PAST MEDICAL & SURGICAL HISTORY:  Carpal tunnel syndrome  Osteoarthritis  Hypertension  S/P cubital tunnel release: bilateral about 5 years ago.  S/P myomectomy: about 25 years ago.  S/P carpal tunnel release  S/P colonoscopy  S/P lumpectomy of breast  S/P arthroscopic knee surgery      Allergies  adhesives (Blisters)  latex (Blisters)  Persantine (Blisters)        ANTIMICROBIALS:  piperacillin/tazobactam IVPB. 3.375 once  piperacillin/tazobactam IVPB. 3.375 every 8 hours      MEDICATIONS  (STANDING):    cefTRIAXone   IVPB   100 mL/Hr IV Intermittent (02-08-19 @ 20:43)    levoFLOXacin IVPB   50 mL/Hr IV Intermittent (02-08-19 @ 11:56)    vancomycin  IVPB   250 mL/Hr IV Intermittent (02-09-19 @ 03:23)        OTHER MEDS: MEDICATIONS  (STANDING):  amLODIPine   Tablet 10 daily  heparin  Injectable 5000 every 8 hours  influenza   Vaccine 0.5 once  lisinopril 10 daily  melatonin 3 at bedtime  metoprolol tartrate 50 two times a day  sodium chloride 3%  Inhalation 4 every 6 hours  triamterene 37.5 mG/hydrochlorothiazide 25 mG Tablet 1 daily      SOCIAL HISTORY:     Lives with her  and granddaughter. Retired from job as x-ray tech, now works part time in retail. Smoked 1ppd for 1-2 years prior to getting pregnancy and then quit 43yrs ago. No EtOH or recreational drugs    FAMILY HISTORY:  Mom and dad alive in there 90s  Sister brain tumor  Maternal aunt with Lupus    REVIEW OF SYSTEMS  [  ] ROS unobtainable because:    [ X ] All other systems negative except as noted below:	    Constitutional:  [X ] fever [X ] chills  [ ] weight loss  [ X] night sweats   Skin:  [ ] rash [ ] phlebitis	  Eyes: [ ] icterus [ ] pain  [ ] discharge	  ENMT: [ ] sore throat  [ ] thrush [ ] ulcers [ ] exudates  Respiratory: [ ] dyspnea [ ] hemoptysis [ ] cough [ ] sputum	  Cardiovascular:  [ ] chest pain [ ] palpitations [ ] edema	  Gastrointestinal:  [ ] nausea [ ] vomiting [ ] diarrhea [ ] constipation [ ] pain	  Genitourinary:  [ ] dysuria [ ] frequency [ ] hematuria [ ] discharge [ ] flank pain  [ ] incontinence  Musculoskeletal:  [ ] myalgias [ ] arthralgias [ ] arthritis  [ ] back pain  Neurological:  [ ] headache [ ] seizures  [ ] confusion/altered mental status  Psychiatric:  [ ] anxiety [ ] depression	  Hematology/Lymphatics:  [ ] lymphadenopathy  Endocrine:  [ ] adrenal [ ] thyroid  Allergic/Immunologic:	 [ ] transplant [ ] seasonal    Vital Signs Last 24 Hrs  T(F): 98.9 (02-09-19 @ 05:29), Max: 99.4 (02-09-19 @ 04:42)    Vital Signs Last 24 Hrs  HR: 76 (02-09-19 @ 05:29) (63 - 76)  BP: 140/85 (02-09-19 @ 05:29) (140/79 - 150/91)  RR: 18 (02-09-19 @ 05:29)  SpO2: 99% (02-09-19 @ 05:29) (97% - 100%)  Wt(kg): --    PHYSICAL EXAM:  General: non-toxic  HEAD/EYES: anicteric, PERRL  ENT:  supple  Cardiovascular:   S1, S2  Respiratory:  clear bilaterally  GI:  soft, non-tender, normal bowel sounds  :  no CVA tenderness   Musculoskeletal:  no synovitis  Neurologic:  grossly non-focal  Skin:  no rash  Lymph: no lymphadenopathy  Psychiatric:  appropriate affect  Vascular:  no phlebitis          WBC Count: 7.82 K/uL (02-09 @ 06:40)  WBC Count: 5.92 K/uL (02-08 @ 10:28)                            10.4   7.82  )-----------( 405      ( 09 Feb 2019 06:40 )             33.9       02-09    137  |  98  |  11  ----------------------------<  98  3.6   |  26  |  0.81    Ca    9.4      09 Feb 2019 06:40  Phos  3.0     02-09  Mg     2.0     02-09    TPro  6.7  /  Alb  3.4  /  TBili  < 0.2<L>  /  DBili  x   /  AST  14  /  ALT  17  /  AlkPhos  75  02-09      Creatinine Trend: 0.81<--, 0.83<--        MICROBIOLOGY:          v            RADIOLOGY:  < from: Xray Chest 2 Views PA/Lat (02.08.19 @ 11:02) >  IMPRESSION: Right upper lobe mass of uncertain etiology that would be   better evaluated by means of CT.    < end of copied text >    < from: CT Angio Chest w/ IV Cont (02.09.19 @ 00:15) >  IMPRESSION:   No pulmonary embolism.    Cavitary right upper lobe lesion, which is likely   infectious/inflammatory, less likely malignancy. Tuberculosis is a   consideration. Three-month follow-up CT recommended for complete   evaluation.    < end of copied text > Patient is a 68y old  Female who presents with a chief complaint of Cough, fevers, sweats, and chills a/w RUL cavitary mass (09 Feb 2019 09:04)      HPI:  67 yo F w/t a PMHx of RA on (Simponi, plaquenil, leflunomide), HTN, provoked DVT, R sided thyroid nodules who p/w a 9 day course of pleuritic CP on inspiration, cough, and night sweats with fever and rigors. Of note the symptoms started with just pain on inspiration and then the cough, night sweats, and fever developed. The cough is non-productive (no hemoptysis). She notes a negative TB screen many years ago when she first started immunomodulatory therapy. Denies recent travel, notes that her granddaughter w/t hidradenitis suppurativa started immunosuppressive therapy and has been sick recently. Of note, she saw her PCP on 1/30/19 who ordered a d-dimer which was positive and CXR which was read as clear. Her symptoms did not improve so she was started on cefpodoxime and azithromycin on Mon 2/4/19. She continued to have cough, night sweats, and fevers in spite of antibiotics so a chest CTA was ordered which, per allscripts revealed a pleural based soft tissue density w/t ground glass halo surrounding it in the RUL (2.7cm x 2.7cm x 3.3cm) so he called her and told her to come to the ED. No recent travel. Born in [place]. She denies N/V and diarrhea. No dysuria.    ED Course:  Vitals   T 97.6  HR 71 /86  RR 18  SpO2 100%  In the ED, CXR was repeated and was read as a possible cavitary lesion in the RUL. She was given 750mg levofloxacin x1, and 500 NS bolus. No leukocytosis. RVP neg. She was then admitted to medicine and put on isolation precautions to r/o TB. (08 Feb 2019 14:58)    Above reviewed. Patient started new TNF-alpha inhibitor in Nov. for RA presents with F/NS/chills found to have a cavitary lesion on chest CT. Pt has had yearly quantiferon test performed at [facility] at least since 2015, most recent 6/2018. Currently she feels well except dry cough.     ID called for workup/management.       PAST MEDICAL & SURGICAL HISTORY:  Carpal tunnel syndrome  Osteoarthritis  Hypertension  S/P cubital tunnel release: bilateral about 5 years ago.  S/P myomectomy: about 25 years ago.  S/P carpal tunnel release  S/P colonoscopy  S/P lumpectomy of breast  S/P arthroscopic knee surgery      Allergies  adhesives (Blisters)  latex (Blisters)  Persantine (Blisters)        ANTIMICROBIALS:  piperacillin/tazobactam IVPB. 3.375 once  piperacillin/tazobactam IVPB. 3.375 every 8 hours      MEDICATIONS  (STANDING):    cefTRIAXone   IVPB   100 mL/Hr IV Intermittent (02-08-19 @ 20:43)    levoFLOXacin IVPB   50 mL/Hr IV Intermittent (02-08-19 @ 11:56)    vancomycin  IVPB   250 mL/Hr IV Intermittent (02-09-19 @ 03:23)        OTHER MEDS: MEDICATIONS  (STANDING):  amLODIPine   Tablet 10 daily  heparin  Injectable 5000 every 8 hours  influenza   Vaccine 0.5 once  lisinopril 10 daily  melatonin 3 at bedtime  metoprolol tartrate 50 two times a day  sodium chloride 3%  Inhalation 4 every 6 hours  triamterene 37.5 mG/hydrochlorothiazide 25 mG Tablet 1 daily      SOCIAL HISTORY:     Lives with her  and granddaughter. Retired from job as x-ray tech, now works part time in retail. Smoked 1ppd for 1-2 years prior to getting pregnancy and then quit 43yrs ago. No EtOH or recreational drugs    FAMILY HISTORY:  Mom and dad alive in their 90s  Sister brain tumor  Maternal aunt with Lupus    REVIEW OF SYSTEMS  [  ] ROS unobtainable because:    [ X ] All other systems negative except as noted below:	    Constitutional:  [X ] fever [X ] chills  [ ] weight loss  [ X] night sweats   Skin:  [ ] rash [ ] phlebitis	  Eyes: [ ] icterus [ ] pain  [ ] discharge	  ENMT: [ ] sore throat  [ ] thrush [ ] ulcers [ ] exudates  Respiratory: [ ] dyspnea [ ] hemoptysis [x ] cough [ ] sputum	  Cardiovascular:  [x ] chest pain [ ] palpitations [ ] edema	  Gastrointestinal:  [ ] nausea [ ] vomiting [ ] diarrhea [ ] constipation [ ] pain	  Genitourinary:  [ ] dysuria [ ] frequency [ ] hematuria [ ] discharge [ ] flank pain  [ ] incontinence  Musculoskeletal:  [ ] myalgias [ ] arthralgias [x ] arthritis  [ ] back pain  Neurological:  [ ] headache [ ] seizures  [ ] confusion/altered mental status  Psychiatric:  [ ] anxiety [ ] depression	  Endocrine:  [ ] adrenal [ ] thyroid  Allergic/Immunologic:	 [ ] transplant [ ] seasonal      Vital Signs Last 24 Hrs  T(F): 98.9 (02-09-19 @ 05:29), Max: 99.4 (02-09-19 @ 04:42)      Vital Signs Last 24 Hrs  HR: 76 (02-09-19 @ 05:29) (63 - 76)  BP: 140/85 (02-09-19 @ 05:29) (140/79 - 150/91)  RR: 18 (02-09-19 @ 05:29)  SpO2: 99% (02-09-19 @ 05:29) (97% - 100%)      PHYSICAL EXAM:  General: non-toxic  HEAD/EYES: anicteric, PERRL  ENT:  supple  Cardiovascular:   S1, S2  Respiratory:  clear bilaterally  GI:  soft, non-tender, normal bowel sounds  :  no CVA tenderness   Musculoskeletal:  no synovitis, some arthritic changes b/l hands.   Neurologic:  grossly non-focal  Skin:  no rash  Psychiatric:  appropriate affect  Vascular:  no phlebitis          WBC Count: 7.82 K/uL (02-09 @ 06:40)  WBC Count: 5.92 K/uL (02-08 @ 10:28)                            10.4   7.82  )-----------( 405      ( 09 Feb 2019 06:40 )             33.9       02-09    137  |  98  |  11  ----------------------------<  98  3.6   |  26  |  0.81    Ca    9.4      09 Feb 2019 06:40  Phos  3.0     02-09  Mg     2.0     02-09    TPro  6.7  /  Alb  3.4  /  TBili  < 0.2<L>  /  DBili  x   /  AST  14  /  ALT  17  /  AlkPhos  75  02-09      Creatinine Trend: 0.81<--, 0.83<--      Rapid Respiratory Viral Panel (02.08.19 @ 12:00)    Adenovirus (RapRVP): Not Detected    Influenza A (RapRVP): Not Detected    Influenza AH1 2009 (RapRVP): Not Detected    Influenza AH1 (RapRVP): Not Detected    Influenza AH3 (RapRVP): Not Detected    Influenza B (RapRVP): Not Detected    Parainfluenza 1 (RapRVP): Not Detected    Parainfluenza 2 (RapRVP): Not Detected    Parainfluenza 3 (RapRVP): Not Detected    Parainfluenza 4 (RapRVP): Not Detected    Resp Syncytial Virus (RapRVP): Not Detected    Chlamydia pneumoniae (RapRVP): Not Detected    Mycoplasma pneumoniae (RapRVP): Not Detected    Entero/Rhinovirus (RapRVP): Not Detected    hMPV (RapRVP): Not Detected        MICROBIOLOGY:  Culture - Blood (02.08.19 @ 13:13)    Culture - Blood:   NO ORGANISMS ISOLATED  NO ORGANISMS ISOLATED AT 24 HOURS    Specimen Source: BLOOD VENOUS            RADIOLOGY: Imaging reviewed personally.   < from: Xray Chest 2 Views PA/Lat (02.08.19 @ 11:02) >  IMPRESSION: Right upper lobe mass of uncertain etiology that would be   better evaluated by means of CT.        < from: CT Angio Chest w/ IV Cont (02.09.19 @ 00:15) >  IMPRESSION:   No pulmonary embolism.    Cavitary right upper lobe lesion, which is likely   infectious/inflammatory, less likely malignancy. Tuberculosis is a   consideration. Three-month follow-up CT recommended for complete   evaluation.

## 2019-02-09 NOTE — CONSULT NOTE ADULT - SUBJECTIVE AND OBJECTIVE BOX
HISTORY OF PRESENT ILLNESS:    PAST MEDICAL & SURGICAL HISTORY:  Carpal tunnel syndrome  Osteoarthritis  Hypertension  S/P cubital tunnel release: bilateral about 5 years ago.  S/P myomectomy: about 25 years ago.  S/P carpal tunnel release  S/P colonoscopy  S/P lumpectomy of breast  S/P arthroscopic knee surgery      REVIEW OF SYSTEMS      General:	    Skin/Breast:  	  Ophthalmologic:  	  ENMT:	    Respiratory and Thorax:  	  Cardiovascular:	    Gastrointestinal:	    Genitourinary:	    Musculoskeletal:	    Neurological:	    Psychiatric:	    Hematology/Lymphatics:	    Endocrine:	    Allergic/Immunologic:	    MEDICATIONS  (STANDING):  amLODIPine   Tablet 10 milliGRAM(s) Oral daily  heparin  Injectable 5000 Unit(s) SubCutaneous every 8 hours  influenza   Vaccine 0.5 milliLiter(s) IntraMuscular once  lidocaine   Patch 1 Patch Transdermal every 24 hours  lisinopril 10 milliGRAM(s) Oral daily  melatonin 3 milliGRAM(s) Oral at bedtime  metoprolol tartrate 50 milliGRAM(s) Oral two times a day  piperacillin/tazobactam IVPB. 3.375 Gram(s) IV Intermittent every 8 hours  sodium chloride 3%  Inhalation 4 milliLiter(s) Inhalation every 8 hours  triamterene 37.5 mG/hydrochlorothiazide 25 mG Tablet 1 Tablet(s) Oral daily    MEDICATIONS  (PRN):      Allergies    adhesives (Blisters)  latex (Blisters)  Persantine (Blisters)    Intolerances        PERTINENT MEDICATION HISTORY:    SOCIAL HISTORY:    FAMILY HISTORY:      Vital Signs Last 24 Hrs  T(C): 37.2 (2019 05:29), Max: 37.4 (2019 04:42)  T(F): 98.9 (2019 05:29), Max: 99.4 (2019 04:42)  HR: 76 (2019 05:29) (63 - 76)  BP: 140/85 (2019 05:29) (140/79 - 150/91)  BP(mean): --  RR: 18 (2019 05:29) (18 - 18)  SpO2: 99% (2019 05:29) (97% - 100%)    Daily Height in cm: 165.1 (2019 22:08)    Daily Weight in k (2019 04:42)    PHYSICAL EXAM:      Constitutional:    Eyes:    ENMT:    Neck:    Breasts:    Back:    Respiratory:    Cardiovascular:    Gastrointestinal:    Genitourinary:    Rectal:    Extremities:    Vascular:    Neurological:    Skin:    Lymph Nodes:    Musculoskeletal:    Psychiatric:        LABS:                        10.4   7.82  )-----------( 405      ( 2019 06:40 )             33.9         137  |  98  |  11  ----------------------------<  98  3.6   |  26  |  0.81    Ca    9.4      2019 06:40  Phos  3.0     -  Mg     2.0         TPro  6.7  /  Alb  3.4  /  TBili  < 0.2<L>  /  DBili  x   /  AST  14  /  ALT  17  /  AlkPhos  75            RADIOLOGY & ADDITIONAL STUDIES: HISTORY OF PRESENT ILLNESS:  68 yof AA, hx of non-erosive RA on immune suppressive medications, reports hx of about 10 days fever, NS, chills, cough.  Was initially tx with ABX by PMD, then with persistent sx came for admit to [facility].  Round to have a RUL cavitary mass.  The patient denies hemoptysis, weight loss, rash, oral ulcer.  She has never had extra-articular manifestations of RA.  She is a retired XRAY tech, worked in a hospital x 27 years, also lives in [place] and rides the [place] [employer] frequently.  Current medication - has been on PO DMARDs plaquenil and arava for yrs, most recently had the first of her new monthly SQ biologic injection with Simponi, which is Golimumab, a monoclonal Ab to anti-TNF, the class of medications known to be a risk of TB reactivation.  She last had quant gold testing in 2018 it was negative.  Most recently, she was treated with low-moderate dose of steroids during Dec-- she had failed her prior medication Orencia SQ and was awaiting the simponi. The patient had polyarthritis so she was being changed from orencia to simponi and treated with steroids during the intermission between drugs.  She had the simponi once early .    PAST MEDICAL & SURGICAL HISTORY:  Carpal tunnel syndrome  Osteoarthritis  Hypertension  S/P cubital tunnel release: bilateral about 5 years ago.  S/P myomectomy: about 25 years ago.  S/P carpal tunnel release  S/P colonoscopy  S/P lumpectomy of breast  S/P arthroscopic knee surgery      REVIEW OF SYSTEMS  see HPI    General:	  NAD  Skin/Breast:  no rash	  Ophthalmologic:  wnl	  ENMT:	  no oral ulcer  Respiratory and Thorax:  	as above   Cardiovascular:	  no CP  Gastrointestinal:	    Genitourinary:	    Musculoskeletal:	  no joint pain         MEDICATIONS  (STANDING):  amLODIPine   Tablet 10 milliGRAM(s) Oral daily  heparin  Injectable 5000 Unit(s) SubCutaneous every 8 hours  influenza   Vaccine 0.5 milliLiter(s) IntraMuscular once  lidocaine   Patch 1 Patch Transdermal every 24 hours  lisinopril 10 milliGRAM(s) Oral daily  melatonin 3 milliGRAM(s) Oral at bedtime  metoprolol tartrate 50 milliGRAM(s) Oral two times a day  piperacillin/tazobactam IVPB. 3.375 Gram(s) IV Intermittent every 8 hours  sodium chloride 3%  Inhalation 4 milliLiter(s) Inhalation every 8 hours  triamterene 37.5 mG/hydrochlorothiazide 25 mG Tablet 1 Tablet(s) Oral daily    MEDICATIONS  (PRN):      Allergies    adhesives (Blisters)  latex (Blisters)  Persantine (Blisters)    Intolerances        PERTINENT MEDICATION HISTORY:    SOCIAL HISTORY: retired XRAY tech  denies ETOH, tobacco    FAMILY HISTORY:      Vital Signs Last 24 Hrs  T(C): 37.2 (2019 05:29), Max: 37.4 (2019 04:42)  T(F): 98.9 (2019 05:29), Max: 99.4 (2019 04:42)  HR: 76 (2019 05:29) (63 - 76)  BP: 140/85 (2019 05:29) (140/79 - 150/91)  BP(mean): --  RR: 18 (2019 05:29) (18 - 18)  SpO2: 99% (2019 05:29) (97% - 100%)    Daily Height in cm: 165.1 (2019 22:08)    Daily Weight in k (2019 04:42)    PHYSICAL EXAM:      Constitutional: a/o x 3    Eyes:wnl    ENMT:no oral lesion    Neck:supple    Breasts:    Back:    Respiratory:CTAB  Cardiovascular:s1, s2, no MGR    Gastrointestinal:    Genitourinary:    Rectal:    Extremities: no edema    Vascular:  pulses intact   Neurological:    Skin: no rash    Lymph Nodes:  no LAD  Musculoskeletal: no swollen or tender joints, no deformities    Psychiatric:  wnl      LABS:                        10.4   7.82  )-----------( 405      ( 2019 06:40 )             33.9     -    137  |  98  |  11  ----------------------------<  98  3.6   |  26  |  0.81    Ca    9.4      2019 06:40  Phos  3.0     -  Mg     2.0         TPro  6.7  /  Alb  3.4  /  TBili  < 0.2<L>  /  DBili  x   /  AST  14  /  ALT  17  /  AlkPhos  75        RADIOLOGY & ADDITIONAL STUDIES: reviewed in detail

## 2019-02-09 NOTE — CONSULT NOTE ADULT - ASSESSMENT
68 yof with non-erosive RA  current Rx regimen including Simponi (monthly SQ injection of monoclonal Ab to TNF-alpha), plaquenil, Arava  In the past was on other Rx that also serve to suppress the immune system: enbrel, humira, orencia, xeljanz, cimzia.    She was changed from orencia to simponi Nov 2018 due to lack of efficacy.  She had a negative quantiferon gold test June 2018 - all of her records are clear in allscripts.    she now has a cavitary lesion in RUL  The biologic agents, especially anti-TNF Ab are a known risk of reactivation TB.  This is the reason patients are screened prior to and during treatment. 68 yof with non-erosive RA  current Rx regimen including Simponi (monthly SQ injection of monoclonal Ab to TNF-alpha), plaquenil, Arava  In the past was on other Rx that also serve to suppress the immune system: enbrel, humira, orencia, xeljanz, cimzia.  She was changed from orencia to simponi Nov 2018 due to lack of efficacy.  She had a negative quantiferon gold test June 2018 - all of her records are clear in allscripts.  she now has a cavitary lesion in RUL  The biologic agents, especially anti-TNF Ab are a known risk of reactivation TB.  They also impose a small risk of malignancy though the CT reading states less likely tumor.    This is the reason patients are screened prior to and during treatment.  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  She could possibly have false negative quant gold given that she's been on immune suppression and may not mount response.  For all of these reasons TB must be high in the differential.  TB and other infections, bacteria, other Mycobacterial, aspergillosis need to be ruled out.  RA patients with low titer RF and no extra-articular manifestations of RA would not typically present with a cavitary lung lesion and that she developed such acute rapid sx makes this less likely as well.  Sarcoid and ANCA vasculitis are other rheumatic d/o that may lead to lesions of this type but she doesn't have any sx/signs to suggest either  please order UA, prot/creat ratio.    Would hold arava for now, can continue plaquenil  Would not treat with simponi again either for now  D/w pulmonary if tissue bx is needed to determine infection vs inflammatory vs malignancy given her hx and medication exposures.

## 2019-02-10 LAB
ANION GAP SERPL CALC-SCNC: 16 MMO/L — HIGH (ref 7–14)
BASOPHILS # BLD AUTO: 0.04 K/UL — SIGNIFICANT CHANGE UP (ref 0–0.2)
BASOPHILS NFR BLD AUTO: 0.6 % — SIGNIFICANT CHANGE UP (ref 0–2)
BUN SERPL-MCNC: 10 MG/DL — SIGNIFICANT CHANGE UP (ref 7–23)
CALCIUM SERPL-MCNC: 9.5 MG/DL — SIGNIFICANT CHANGE UP (ref 8.4–10.5)
CHLORIDE SERPL-SCNC: 101 MMOL/L — SIGNIFICANT CHANGE UP (ref 98–107)
CO2 SERPL-SCNC: 26 MMOL/L — SIGNIFICANT CHANGE UP (ref 22–31)
CREAT SERPL-MCNC: 0.91 MG/DL — SIGNIFICANT CHANGE UP (ref 0.5–1.3)
CRP SERPL-MCNC: 26.2 MG/L — HIGH
CRYPTOC AG FLD QL: NEGATIVE — SIGNIFICANT CHANGE UP
EOSINOPHIL # BLD AUTO: 0.12 K/UL — SIGNIFICANT CHANGE UP (ref 0–0.5)
EOSINOPHIL NFR BLD AUTO: 1.9 % — SIGNIFICANT CHANGE UP (ref 0–6)
ERYTHROCYTE [SEDIMENTATION RATE] IN BLOOD: 97 MM/HR — HIGH (ref 4–25)
GLUCOSE SERPL-MCNC: 119 MG/DL — HIGH (ref 70–99)
HCT VFR BLD CALC: 33.2 % — LOW (ref 34.5–45)
HGB BLD-MCNC: 10.4 G/DL — LOW (ref 11.5–15.5)
HIV 1+2 AB+HIV1 P24 AG SERPL QL IA: SIGNIFICANT CHANGE UP
IMM GRANULOCYTES NFR BLD AUTO: 0.6 % — SIGNIFICANT CHANGE UP (ref 0–1.5)
LDH SERPL L TO P-CCNC: 180 U/L — SIGNIFICANT CHANGE UP (ref 135–225)
LYMPHOCYTES # BLD AUTO: 1.69 K/UL — SIGNIFICANT CHANGE UP (ref 1–3.3)
LYMPHOCYTES # BLD AUTO: 26.1 % — SIGNIFICANT CHANGE UP (ref 13–44)
MCHC RBC-ENTMCNC: 27.3 PG — SIGNIFICANT CHANGE UP (ref 27–34)
MCHC RBC-ENTMCNC: 31.3 % — LOW (ref 32–36)
MCV RBC AUTO: 87.1 FL — SIGNIFICANT CHANGE UP (ref 80–100)
MONOCYTES # BLD AUTO: 0.81 K/UL — SIGNIFICANT CHANGE UP (ref 0–0.9)
MONOCYTES NFR BLD AUTO: 12.5 % — SIGNIFICANT CHANGE UP (ref 2–14)
NEUTROPHILS # BLD AUTO: 3.78 K/UL — SIGNIFICANT CHANGE UP (ref 1.8–7.4)
NEUTROPHILS NFR BLD AUTO: 58.3 % — SIGNIFICANT CHANGE UP (ref 43–77)
NRBC # FLD: 0 K/UL — LOW (ref 25–125)
PLATELET # BLD AUTO: 411 K/UL — HIGH (ref 150–400)
PMV BLD: 9.2 FL — SIGNIFICANT CHANGE UP (ref 7–13)
POTASSIUM SERPL-MCNC: 3.4 MMOL/L — LOW (ref 3.5–5.3)
POTASSIUM SERPL-SCNC: 3.4 MMOL/L — LOW (ref 3.5–5.3)
RBC # BLD: 3.81 M/UL — SIGNIFICANT CHANGE UP (ref 3.8–5.2)
RBC # FLD: 14.3 % — SIGNIFICANT CHANGE UP (ref 10.3–14.5)
SODIUM SERPL-SCNC: 143 MMOL/L — SIGNIFICANT CHANGE UP (ref 135–145)
WBC # BLD: 6.48 K/UL — SIGNIFICANT CHANGE UP (ref 3.8–10.5)
WBC # FLD AUTO: 6.48 K/UL — SIGNIFICANT CHANGE UP (ref 3.8–10.5)

## 2019-02-10 PROCEDURE — 99233 SBSQ HOSP IP/OBS HIGH 50: CPT | Mod: GC

## 2019-02-10 PROCEDURE — 99232 SBSQ HOSP IP/OBS MODERATE 35: CPT

## 2019-02-10 RX ORDER — POTASSIUM CHLORIDE 20 MEQ
40 PACKET (EA) ORAL ONCE
Qty: 0 | Refills: 0 | Status: COMPLETED | OUTPATIENT
Start: 2019-02-10 | End: 2019-02-10

## 2019-02-10 RX ADMIN — SODIUM CHLORIDE 4 MILLILITER(S): 9 INJECTION INTRAMUSCULAR; INTRAVENOUS; SUBCUTANEOUS at 14:55

## 2019-02-10 RX ADMIN — Medication 40 MILLIEQUIVALENT(S): at 12:21

## 2019-02-10 RX ADMIN — LISINOPRIL 10 MILLIGRAM(S): 2.5 TABLET ORAL at 05:25

## 2019-02-10 RX ADMIN — SODIUM CHLORIDE 4 MILLILITER(S): 9 INJECTION INTRAMUSCULAR; INTRAVENOUS; SUBCUTANEOUS at 07:25

## 2019-02-10 RX ADMIN — AMLODIPINE BESYLATE 10 MILLIGRAM(S): 2.5 TABLET ORAL at 05:25

## 2019-02-10 RX ADMIN — SODIUM CHLORIDE 4 MILLILITER(S): 9 INJECTION INTRAMUSCULAR; INTRAVENOUS; SUBCUTANEOUS at 00:55

## 2019-02-10 RX ADMIN — Medication 50 MILLIGRAM(S): at 05:25

## 2019-02-10 RX ADMIN — PIPERACILLIN AND TAZOBACTAM 25 GRAM(S): 4; .5 INJECTION, POWDER, LYOPHILIZED, FOR SOLUTION INTRAVENOUS at 21:01

## 2019-02-10 RX ADMIN — Medication 50 MILLIGRAM(S): at 17:30

## 2019-02-10 RX ADMIN — Medication 1 TABLET(S): at 05:26

## 2019-02-10 RX ADMIN — Medication 3 MILLIGRAM(S): at 21:05

## 2019-02-10 RX ADMIN — LIDOCAINE 1 PATCH: 4 CREAM TOPICAL at 21:05

## 2019-02-10 RX ADMIN — PIPERACILLIN AND TAZOBACTAM 25 GRAM(S): 4; .5 INJECTION, POWDER, LYOPHILIZED, FOR SOLUTION INTRAVENOUS at 02:10

## 2019-02-10 RX ADMIN — Medication 500 MILLIGRAM(S): at 00:15

## 2019-02-10 RX ADMIN — PIPERACILLIN AND TAZOBACTAM 25 GRAM(S): 4; .5 INJECTION, POWDER, LYOPHILIZED, FOR SOLUTION INTRAVENOUS at 12:21

## 2019-02-10 NOTE — PROGRESS NOTE ADULT - ASSESSMENT
69 yo F w/t a PMHx of RA on (Simponi, plaquenil, leflunomide), HTN, provoked DVT, R sided thyroid nodules who p/w a 9 day course of pleuritic CP on inspiration, cough, and night sweats with fever and rigors 2/2 infectious vs malignancy vs RA nodules vs PE (least likely given O2 sat).

## 2019-02-10 NOTE — PROGRESS NOTE ADULT - PROBLEM SELECTOR PLAN 1
Acute cough, fevers, and night sweats and chills possibly 2/2 infectious causes (TB, staph, opportunistic infections) vs. rheumatoid nodule vs. malignancy. Could still be viral URI w/t coincidental lung mass. CT confirms clear cavitary lesion.   -ID consulted  -Pulm consult  -Continue zosyn  -Trend fever  -fungitell  -Induce sputum culture Q8H using hypertonic saline x3  -Isolation precaution - Acute cough, fevers, and night sweats and chills possibly 2/2 infectious causes (TB, staph, opportunistic infections) vs. rheumatoid nodule vs. malignancy. Could still be viral URI w/t coincidental lung mass. CT confirms clear cavitary lesion.   -fungitell ordered  -Induce sputum culture Q8H using hypertonic saline x3  -ID consulted  -Pulm consult  -Continue zosyn  -Trend fever  -Isolation precaution  - May need bronch

## 2019-02-10 NOTE — PROGRESS NOTE ADULT - ASSESSMENT
69 yo F w/t a PMHx of RA on (Golimumab, plaquenil, leflunomide), HTN, provoked DVT, R sided thyroid nodules who p/w a 9 day course of pleuritic CP on inspiration, cough, and night sweats with fever and rigors 2/2 infectious vs malignancy vs RA nodules vs PE.   CT chest showing cavitary right upper lobe lesion. PE ruled out outpatient with CTA.   She has no risk factors for TB as she was born and raised in [place] and only lived in [facility] for a few years. No known exposures to TB, no family members with TB, never been incarcerated or worked for the [employer] system. She is a retired xray technician.  Ddx include TB vs Invasive fungal infections, including histoplasmosis, coccidioidomycosis, candidiasis, aspergillosis, and pneumocystosis which have been described with the use of golimumab. Furthermore, leflunomide can also increase risk of infections such as TB, aspergillosis and PJP.     Recommendations:   1.	Continue Piperacillin/tazobactam 3.375 grams every 8 hours   2.	still waiting for Sputum AFB x3 q8hrs with one being in the AM->if unable to obtain may need a bronch  3.	obtain sputum gram stain and bacterial culture if feasible  4.	Labs sent: HIV, ESR, CRP, Fungitell, LDH, Galactomannan antigen, crypt serum antigen, Histoplasma urine Ag, cocci ab  5.	Rheum and pulmonary on board. 69 yo F w/t a PMHx of RA on (Golimumab, plaquenil, leflunomide), HTN, provoked DVT, R sided thyroid nodules who p/w a 9 day course of pleuritic CP on inspiration, cough, and night sweats with fever and rigors 2/2 infectious vs malignancy vs RA nodules vs PE.   CT chest showing cavitary right upper lobe lesion. PE ruled out outpatient with CTA.   She has no risk factors for TB as she was born and raised in [place] and only lived in [facility] for a few years. No known exposures to TB, no family members with TB, never been incarcerated or worked for the retirement system. She is a retired xray technician.  Ddx include TB vs Invasive fungal infections, including histoplasmosis, coccidioidomycosis, candidiasis, aspergillosis, and pneumocystosis which have been described with the use of golimumab. Furthermore, leflunomide can also increase risk of infections such as TB, aspergillosis and PJP.   Overall Cavitary pneumonia in an immunosuppressed pt, new diarrhea.       Recommendations:   1.	Continue Piperacillin/tazobactam 3.375 grams every 8 hours   2.	still waiting for Sputum AFB x3 q8hrs, unable to provide specimen->if unable to obtain may need a bronch  3.	obtain sputum gram stain and bacterial culture if feasible  4.	Labs sent: HIV, Fungitell, Galactomannan antigen, crypt serum antigen, Histoplasma urine Ag, cocci ab  5.	ESR, CRP elevated, LDH normal.   6.	Rheum and pulmonary on board.  7.	If diarrhea persist please check C diff.

## 2019-02-10 NOTE — PROGRESS NOTE ADULT - SUBJECTIVE AND OBJECTIVE BOX
Follow Up:  68F with RA on biologics with cavitary lesions on CT    Interval History:    ROS:    Unobtainable because:  All other systems negative    Constitutional: no fever, no chills  Head: no trauma  Eyes: no vision changes, no eye pain  ENT:  no sore throat, no rhinorrhea  Cardiovascular:  no chest pain, no palpitation  Respiratory:  no SOB, no cough  GI:  no abd pain, no vomiting, no diarrhea  urinary: no dysuria, no hematuria, no flank pain  musculoskeletal:  no joint pain, no joint swelling  skin:  no rash  neurology:  no headache, no seizure, no change in mental status  psych: no anxiety, no depression         Allergies  adhesives (Blisters)  latex (Blisters)  Persantine (Blisters)        ANTIMICROBIALS:  piperacillin/tazobactam IVPB. 3.375 every 8 hours      OTHER MEDS:  amLODIPine   Tablet 10 milliGRAM(s) Oral daily  guaiFENesin    Syrup 100 milliGRAM(s) Oral every 6 hours PRN  heparin  Injectable 5000 Unit(s) SubCutaneous every 8 hours  influenza   Vaccine 0.5 milliLiter(s) IntraMuscular once  lidocaine   Patch 1 Patch Transdermal every 24 hours  lisinopril 10 milliGRAM(s) Oral daily  melatonin 3 milliGRAM(s) Oral at bedtime  metoprolol tartrate 50 milliGRAM(s) Oral two times a day  sodium chloride 3%  Inhalation 4 milliLiter(s) Inhalation every 8 hours  triamterene 37.5 mG/hydrochlorothiazide 25 mG Tablet 1 Tablet(s) Oral daily      Vital Signs Last 24 Hrs  T(C): 36.7 (10 Feb 2019 05:23), Max: 36.8 (09 Feb 2019 15:55)  T(F): 98.1 (10 Feb 2019 05:23), Max: 98.3 (09 Feb 2019 22:41)  HR: 60 (10 Feb 2019 05:23) (60 - 76)  BP: 140/85 (10 Feb 2019 05:23) (130/70 - 144/82)  BP(mean): --  RR: 19 (10 Feb 2019 05:23) (18 - 19)  SpO2: 97% (10 Feb 2019 05:23) (97% - 99%)    Physical Exam:  General:    NAD,  non toxic, A&O x 3  Head: atraumatic, normocephalic  Eye: normal sclera and conjunctiva  ENT:    no oropharyngeal lesions,   no LAD,   neck supple  Cardio:     regular S1, S2,  no murmur  Respiratory:    clear b/l,    no wheezing  abd:     soft,   BS +,   no tenderness,    no organomegaly  :   no CVAT,  no suprapubic tenderness,   no  gonzalez  Musculoskeletal:   no joint swelling,   no edema  vascular: no lines, normal pulses  Skin:    no rash  Neurologic:     no focal deficit  psych: normal affect, no suicidal ideation                          10.4   6.48  )-----------( 411      ( 10 Feb 2019 07:15 )             33.2       02-10    143  |  101  |  10  ----------------------------<  119<H>  3.4<L>   |  26  |  0.91    Ca    9.5      10 Feb 2019 07:15  Phos  3.0     02-09  Mg     2.0     02-09    TPro  6.7  /  Alb  3.4  /  TBili  < 0.2<L>  /  DBili  x   /  AST  14  /  ALT  17  /  AlkPhos  75  02-09          MICROBIOLOGY:  Culture - Blood (02.08.19 @ 13:13)    Culture - Blood:   NO ORGANISMS ISOLATED  NO ORGANISMS ISOLATED AT 24 HOURS    Specimen Source: BLOOD VENOUS            RADIOLOGY: Imaging reviewed personally.   < from: Xray Chest 2 Views PA/Lat (02.08.19 @ 11:02) >  IMPRESSION: Right upper lobe mass of uncertain etiology that would be   better evaluated by means of CT.        < from: CT Angio Chest w/ IV Cont (02.09.19 @ 00:15) >  IMPRESSION:   No pulmonary embolism.    Cavitary right upper lobe lesion, which is likely   infectious/inflammatory, less likely malignancy. Tuberculosis is a   consideration. Three-month follow-up CT recommended for complete   evaluation. Follow Up:  68F with RA on biologics with cavitary lesions on CT    Interval History:  Afebrile, cough dry, less often, having diarrhea, had 3 BM's already, loose, no abdominal cramping or pain.     ROS:    Cardiovascular:  no chest pain,   Respiratory:  no SOB,   GI: no vomiting,   urinary: no dysuria  musculoskeletal:  no joint pain,   skin:  no rash        Allergies  adhesives (Blisters)  latex (Blisters)  Persantine (Blisters)        ANTIMICROBIALS:  piperacillin/tazobactam IVPB. 3.375 every 8 hours      OTHER MEDS:  amLODIPine   Tablet 10 milliGRAM(s) Oral daily  guaiFENesin    Syrup 100 milliGRAM(s) Oral every 6 hours PRN  heparin  Injectable 5000 Unit(s) SubCutaneous every 8 hours  influenza   Vaccine 0.5 milliLiter(s) IntraMuscular once  lidocaine   Patch 1 Patch Transdermal every 24 hours  lisinopril 10 milliGRAM(s) Oral daily  melatonin 3 milliGRAM(s) Oral at bedtime  metoprolol tartrate 50 milliGRAM(s) Oral two times a day  sodium chloride 3%  Inhalation 4 milliLiter(s) Inhalation every 8 hours  triamterene 37.5 mG/hydrochlorothiazide 25 mG Tablet 1 Tablet(s) Oral daily      Vital Signs Last 24 Hrs  T(C): 36.7 (10 Feb 2019 05:23), Max: 36.8 (09 Feb 2019 15:55)  T(F): 98.1 (10 Feb 2019 05:23), Max: 98.3 (09 Feb 2019 22:41)  HR: 60 (10 Feb 2019 05:23) (60 - 76)  BP: 140/85 (10 Feb 2019 05:23) (130/70 - 144/82)  RR: 19 (10 Feb 2019 05:23) (18 - 19)  SpO2: 97% (10 Feb 2019 05:23) (97% - 99%)      Physical Exam:  General:    NAD,  non toxic, A&O x 3  Cardio:     regular S1, S2,  no murmur  Respiratory:  + air entry b/l.   abd:     soft,   BS +,   no tenderness,    :  no  gonzalez  Skin:    no rash  no phlebitis                             10.4   6.48  )-----------( 411      ( 10 Feb 2019 07:15 )             33.2       02-10    143  |  101  |  10  ----------------------------<  119<H>  3.4<L>   |  26  |  0.91    Ca    9.5      10 Feb 2019 07:15  Phos  3.0     02-09  Mg     2.0     02-09    TPro  6.7  /  Alb  3.4  /  TBili  < 0.2<L>  /  DBili  x   /  AST  14  /  ALT  17  /  AlkPhos  75  02-09      MICROBIOLOGY:  Culture - Blood (02.08.19 @ 13:13)    Culture - Blood:   NO ORGANISMS ISOLATED  NO ORGANISMS ISOLATED AT 24 HOURS    Specimen Source: BLOOD VENOUS

## 2019-02-10 NOTE — PROGRESS NOTE ADULT - SUBJECTIVE AND OBJECTIVE BOX
For Night coverage 7pm-7am: NS- page [phone] Team1-3, page [phone] Team4 & Care Model  Sat/[facility] Coverage 12pm-7pm: NS- page [phone] for Team1-4, PRISCILLA- pager forwarded to covering Resident    CONTACT INFO:  LUISA Smith MD  Internal Medicine PGY1  Pager: [phone]/ [phone]    ISI CRONIN  68y  Female    Patient is a 68y old  Female who presents with a chief complaint of Cough, fevers, sweats, and chills a/w RUL cavitary mass (10 Feb 2019 09:56)    INTERVAL HPI / OVERNIGHT EVENTS: No acute events overnight. Patient seen and evaluated at bedside. +cough. No fever/chills.  Denies hemoptysis, SOB at rest, chest pain      OBJECTIVE:  Vitals Signs (24 Hrs):  T(C): 36.7 (02-10-19 @ 05:23), Max: 36.8 (02-09-19 @ 15:55)  HR: 60 (02-10-19 @ 05:23) (60 - 76)  BP: 140/85 (02-10-19 @ 05:23) (130/70 - 144/82)  RR: 19 (02-10-19 @ 05:23) (18 - 19)  SpO2: 97% (02-10-19 @ 05:23) (97% - 99%)    MEDICATIONS:  amLODIPine   Tablet 10 milliGRAM(s) Oral daily  guaiFENesin    Syrup 100 milliGRAM(s) Oral every 6 hours PRN Cough  heparin  Injectable 5000 Unit(s) SubCutaneous every 8 hours  influenza   Vaccine 0.5 milliLiter(s) IntraMuscular once  lidocaine   Patch 1 Patch Transdermal every 24 hours  lisinopril 10 milliGRAM(s) Oral daily  melatonin 3 milliGRAM(s) Oral at bedtime  metoprolol tartrate 50 milliGRAM(s) Oral two times a day  piperacillin/tazobactam IVPB. 3.375 Gram(s) IV Intermittent every 8 hours  sodium chloride 3%  Inhalation 4 milliLiter(s) Inhalation every 8 hours  triamterene 37.5 mG/hydrochlorothiazide 25 mG Tablet 1 Tablet(s) Oral daily      PHYSICAL EXAM:  General: Comfortable, no apparent distress  HEENT: Atraumatic; EOMI,   Neck: Supple; no JVD;  Chest/Lungs: Clear to auscultation B/L; no rales, rhonchi or wheezing  Heart: Regular rate and rhythm; normal S1/S2; no murmurs, rubs, or gallops  Abdomen: Soft, nontender, nondistended; bowel sounds present  Extremities: PT/DP pulses 2+ B/L; no LE edema  Skin: No rashes or lesions  Neurological: A&O x3    LABS:                        10.4   6.48  )-----------( 411      ( 10 Feb 2019 07:15 )             33.2     02-10    143  |  101  |  10  ----------------------------<  119<H>  3.4<L>   |  26  |  0.91    Ca    9.5      10 Feb 2019 07:15  Phos  3.0     02-09  Mg     2.0     02-09    TPro  6.7  /  Alb  3.4  /  TBili  < 0.2<L>  /  DBili  x   /  AST  14  /  ALT  17  /  AlkPhos  75  02-09        CAPILLARY BLOOD GLUCOSE            RADIOLOGY & ADDITIONAL TESTS:    ALLERGIES:  adhesives (Blisters)  latex (Blisters)  Persantine (Blisters)      Labs and imaging personally reviewed and interpreted [  ]  Consult notes reviewed   Case discussed with consultants For Night coverage 7pm-7am: NS- page 1443 Team1-3, page 1446 Team4 & Care Model  Sat/[facility] Coverage 12pm-7pm: NS- page 1443 for Team1-4, PRISCILLA- pager forwarded to covering Resident    CONTACT INFO:  LUISA Smith MD  Internal Medicine PGY1  Pager: [phone]/ [phone]    ISI CRONIN  68y  Female    Patient is a 68y old  Female who presents with a chief complaint of Cough, fevers, sweats, and chills a/w RUL cavitary mass (10 Feb 2019 09:56)    INTERVAL HPI / OVERNIGHT EVENTS: No acute events overnight. Patient seen and evaluated at bedside. + dry cough, +diarrhea. No fever/chills.  Denies hemoptysis, SOB at rest, chest pain      OBJECTIVE:  Vitals Signs (24 Hrs):  T(C): 36.7 (02-10-19 @ 05:23), Max: 36.8 (02-09-19 @ 15:55)  HR: 60 (02-10-19 @ 05:23) (60 - 76)  BP: 140/85 (02-10-19 @ 05:23) (130/70 - 144/82)  RR: 19 (02-10-19 @ 05:23) (18 - 19)  SpO2: 97% (02-10-19 @ 05:23) (97% - 99%)    MEDICATIONS:  amLODIPine   Tablet 10 milliGRAM(s) Oral daily  guaiFENesin    Syrup 100 milliGRAM(s) Oral every 6 hours PRN Cough  heparin  Injectable 5000 Unit(s) SubCutaneous every 8 hours  influenza   Vaccine 0.5 milliLiter(s) IntraMuscular once  lidocaine   Patch 1 Patch Transdermal every 24 hours  lisinopril 10 milliGRAM(s) Oral daily  melatonin 3 milliGRAM(s) Oral at bedtime  metoprolol tartrate 50 milliGRAM(s) Oral two times a day  piperacillin/tazobactam IVPB. 3.375 Gram(s) IV Intermittent every 8 hours  sodium chloride 3%  Inhalation 4 milliLiter(s) Inhalation every 8 hours  triamterene 37.5 mG/hydrochlorothiazide 25 mG Tablet 1 Tablet(s) Oral daily      PHYSICAL EXAM:  General: Comfortable, no apparent distress  HEENT: Atraumatic; EOMI,   Neck: Supple; no JVD;  Chest/Lungs: Clear to auscultation B/L; no rales, rhonchi or wheezing  Heart: Regular rate and rhythm; normal S1/S2; no murmurs, rubs, or gallops  Abdomen: Soft, nontender, nondistended; bowel sounds present  Extremities: PT/DP pulses 2+ B/L; no LE edema  Skin: No rashes or lesions  Neurological: A&O x3    LABS:                        10.4   6.48  )-----------( 411      ( 10 Feb 2019 07:15 )             33.2     02-10    143  |  101  |  10  ----------------------------<  119<H>  3.4<L>   |  26  |  0.91    Ca    9.5      10 Feb 2019 07:15  Phos  3.0     02-09  Mg     2.0     02-09    TPro  6.7  /  Alb  3.4  /  TBili  < 0.2<L>  /  DBili  x   /  AST  14  /  ALT  17  /  AlkPhos  75  02-09        CAPILLARY BLOOD GLUCOSE            RADIOLOGY & ADDITIONAL TESTS:    ALLERGIES:  adhesives (Blisters)  latex (Blisters)  Persantine (Blisters)      Labs and imaging personally reviewed and interpreted [  ]  Consult notes reviewed   Case discussed with consultants

## 2019-02-11 LAB
ANION GAP SERPL CALC-SCNC: 9 MMO/L — SIGNIFICANT CHANGE UP (ref 7–14)
BASOPHILS # BLD AUTO: 0.03 K/UL — SIGNIFICANT CHANGE UP (ref 0–0.2)
BASOPHILS NFR BLD AUTO: 0.4 % — SIGNIFICANT CHANGE UP (ref 0–2)
BUN SERPL-MCNC: 11 MG/DL — SIGNIFICANT CHANGE UP (ref 7–23)
CALCIUM SERPL-MCNC: 9.4 MG/DL — SIGNIFICANT CHANGE UP (ref 8.4–10.5)
CHLORIDE SERPL-SCNC: 104 MMOL/L — SIGNIFICANT CHANGE UP (ref 98–107)
CO2 SERPL-SCNC: 29 MMOL/L — SIGNIFICANT CHANGE UP (ref 22–31)
CREAT SERPL-MCNC: 0.86 MG/DL — SIGNIFICANT CHANGE UP (ref 0.5–1.3)
EOSINOPHIL # BLD AUTO: 0.21 K/UL — SIGNIFICANT CHANGE UP (ref 0–0.5)
EOSINOPHIL NFR BLD AUTO: 2.9 % — SIGNIFICANT CHANGE UP (ref 0–6)
GAMMA INTERFERON BACKGROUND BLD IA-ACNC: 0.06 IU/ML — SIGNIFICANT CHANGE UP
GLUCOSE SERPL-MCNC: 104 MG/DL — HIGH (ref 70–99)
HCT VFR BLD CALC: 34 % — LOW (ref 34.5–45)
HGB BLD-MCNC: 10.4 G/DL — LOW (ref 11.5–15.5)
IMM GRANULOCYTES NFR BLD AUTO: 0.5 % — SIGNIFICANT CHANGE UP (ref 0–1.5)
LYMPHOCYTES # BLD AUTO: 2.16 K/UL — SIGNIFICANT CHANGE UP (ref 1–3.3)
LYMPHOCYTES # BLD AUTO: 29.7 % — SIGNIFICANT CHANGE UP (ref 13–44)
M TB IFN-G BLD-IMP: NEGATIVE — SIGNIFICANT CHANGE UP
M TB IFN-G CD4+ BCKGRND COR BLD-ACNC: 0.01 IU/ML — SIGNIFICANT CHANGE UP
M TB IFN-G CD4+CD8+ BCKGRND COR BLD-ACNC: 0.01 IU/ML — SIGNIFICANT CHANGE UP
MCHC RBC-ENTMCNC: 27.2 PG — SIGNIFICANT CHANGE UP (ref 27–34)
MCHC RBC-ENTMCNC: 30.6 % — LOW (ref 32–36)
MCV RBC AUTO: 88.8 FL — SIGNIFICANT CHANGE UP (ref 80–100)
MONOCYTES # BLD AUTO: 0.93 K/UL — HIGH (ref 0–0.9)
MONOCYTES NFR BLD AUTO: 12.8 % — SIGNIFICANT CHANGE UP (ref 2–14)
NEUTROPHILS # BLD AUTO: 3.91 K/UL — SIGNIFICANT CHANGE UP (ref 1.8–7.4)
NEUTROPHILS NFR BLD AUTO: 53.7 % — SIGNIFICANT CHANGE UP (ref 43–77)
NRBC # FLD: 0 K/UL — LOW (ref 25–125)
PLATELET # BLD AUTO: 422 K/UL — HIGH (ref 150–400)
PMV BLD: 9.3 FL — SIGNIFICANT CHANGE UP (ref 7–13)
POTASSIUM SERPL-MCNC: 3.9 MMOL/L — SIGNIFICANT CHANGE UP (ref 3.5–5.3)
POTASSIUM SERPL-SCNC: 3.9 MMOL/L — SIGNIFICANT CHANGE UP (ref 3.5–5.3)
QUANT TB PLUS MITOGEN MINUS NIL: 5.45 IU/ML — SIGNIFICANT CHANGE UP
RBC # BLD: 3.83 M/UL — SIGNIFICANT CHANGE UP (ref 3.8–5.2)
RBC # FLD: 14.6 % — HIGH (ref 10.3–14.5)
SODIUM SERPL-SCNC: 142 MMOL/L — SIGNIFICANT CHANGE UP (ref 135–145)
SPECIMEN SOURCE: SIGNIFICANT CHANGE UP
WBC # BLD: 7.28 K/UL — SIGNIFICANT CHANGE UP (ref 3.8–10.5)
WBC # FLD AUTO: 7.28 K/UL — SIGNIFICANT CHANGE UP (ref 3.8–10.5)

## 2019-02-11 PROCEDURE — 99233 SBSQ HOSP IP/OBS HIGH 50: CPT | Mod: GC

## 2019-02-11 PROCEDURE — 99232 SBSQ HOSP IP/OBS MODERATE 35: CPT

## 2019-02-11 RX ADMIN — Medication 1 TABLET(S): at 06:23

## 2019-02-11 RX ADMIN — Medication 3 MILLIGRAM(S): at 21:02

## 2019-02-11 RX ADMIN — LISINOPRIL 10 MILLIGRAM(S): 2.5 TABLET ORAL at 06:23

## 2019-02-11 RX ADMIN — LIDOCAINE 1 PATCH: 4 CREAM TOPICAL at 10:04

## 2019-02-11 RX ADMIN — PIPERACILLIN AND TAZOBACTAM 25 GRAM(S): 4; .5 INJECTION, POWDER, LYOPHILIZED, FOR SOLUTION INTRAVENOUS at 21:01

## 2019-02-11 RX ADMIN — PIPERACILLIN AND TAZOBACTAM 25 GRAM(S): 4; .5 INJECTION, POWDER, LYOPHILIZED, FOR SOLUTION INTRAVENOUS at 06:31

## 2019-02-11 RX ADMIN — PIPERACILLIN AND TAZOBACTAM 25 GRAM(S): 4; .5 INJECTION, POWDER, LYOPHILIZED, FOR SOLUTION INTRAVENOUS at 14:49

## 2019-02-11 RX ADMIN — Medication 50 MILLIGRAM(S): at 06:24

## 2019-02-11 RX ADMIN — LIDOCAINE 1 PATCH: 4 CREAM TOPICAL at 06:23

## 2019-02-11 RX ADMIN — AMLODIPINE BESYLATE 10 MILLIGRAM(S): 2.5 TABLET ORAL at 06:24

## 2019-02-11 RX ADMIN — SODIUM CHLORIDE 4 MILLILITER(S): 9 INJECTION INTRAMUSCULAR; INTRAVENOUS; SUBCUTANEOUS at 21:20

## 2019-02-11 NOTE — PROGRESS NOTE ADULT - SUBJECTIVE AND OBJECTIVE BOX
Interval Events:  Appears well  Unable to induce sputum  Will need bronchoscopy to rule out TB    REVIEW OF SYSTEMS:  [x] All other systems negative  [ ] Unable to assess ROS because ________    OBJECTIVE:  ICU Vital Signs Last 24 Hrs  T(C): 36.9 (11 Feb 2019 13:45), Max: 37 (11 Feb 2019 06:06)  T(F): 98.4 (11 Feb 2019 13:45), Max: 98.6 (11 Feb 2019 06:06)  HR: 69 (11 Feb 2019 17:02) (61 - 71)  BP: 137/87 (11 Feb 2019 17:02) (122/72 - 138/88)  BP(mean): --  ABP: --  ABP(mean): --  RR: 18 (11 Feb 2019 13:45) (18 - 19)  SpO2: 97% (11 Feb 2019 13:45) (96% - 97%)        CAPILLARY BLOOD GLUCOSE          PHYSICAL EXAM:  General: NAD  Neck: No JVD  Respiratory: CTAB  Cardiovascular: RRR, no murmur  Abdomen: Soft, nontender  Skin: Warm  Neurological: A&Ox3    HOSPITAL MEDICATIONS:  heparin  Injectable 5000 Unit(s) SubCutaneous every 8 hours    piperacillin/tazobactam IVPB. 3.375 Gram(s) IV Intermittent every 8 hours    amLODIPine   Tablet 10 milliGRAM(s) Oral daily  lisinopril 10 milliGRAM(s) Oral daily  metoprolol tartrate 50 milliGRAM(s) Oral two times a day  triamterene 37.5 mG/hydrochlorothiazide 25 mG Tablet 1 Tablet(s) Oral daily      guaiFENesin    Syrup 100 milliGRAM(s) Oral every 6 hours PRN  sodium chloride 3%  Inhalation 4 milliLiter(s) Inhalation every 8 hours    melatonin 3 milliGRAM(s) Oral at bedtime            influenza   Vaccine 0.5 milliLiter(s) IntraMuscular once    lidocaine   Patch 1 Patch Transdermal every 24 hours        LABS:                        10.4   7.28  )-----------( 422      ( 11 Feb 2019 05:45 )             34.0     Hgb Trend: 10.4<--, 10.4<--, 10.4<--, 10.9<--  02-11    142  |  104  |  11  ----------------------------<  104<H>  3.9   |  29  |  0.86    Ca    9.4      11 Feb 2019 05:45      Creatinine Trend: 0.86<--, 0.91<--, 0.81<--, 0.83<--      MICROBIOLOGY:     RADIOLOGY:  [x] Reviewed and interpreted by me  CT chest 2/9/19 - cavitary RUL lesion      ASSESSMENT AND RECOMMENDATION:    68F with rheumatoid arthritis on immunosuppression here with subjective fever/chills and cough for 9 days.   Has cavitary RUL nodule, which had broad differential given her disease and immunosuppression.     Bronchoscopy scheduled for 2/12 2 PM  NPO after midnight  Continue Zosyn for now    Umesh Newsome MD  Pulmonary and Critical Care Fellow  [phone]

## 2019-02-11 NOTE — PROGRESS NOTE ADULT - ASSESSMENT
69 yo F w/t a PMHx of RA on (Golimumab, plaquenil, leflunomide), HTN, provoked DVT, R sided thyroid nodules who p/w a 9 day course of pleuritic CP on inspiration, cough, and night sweats with fever and rigors 2/2 infectious vs malignancy vs RA nodules vs PE.   CT chest showing cavitary right upper lobe lesion. PE ruled out outpatient with CTA.   She has no risk factors for TB as she was born and raised in [place] and only lived in [facility] for a few years. No known exposures to TB, no family members with TB, never been incarcerated or worked for the snf system. She is a retired xray technician.  Ddx include TB vs Invasive fungal infections, including histoplasmosis, coccidioidomycosis, candidiasis, aspergillosis, and pneumocystosis which have been described with the use of golimumab. Furthermore, leflunomide can also increase risk of infections such as TB, aspergillosis and PJP.   Overall Cavitary pneumonia in an immunosuppressed pt, new diarrhea, now resolved.       Recommendations:   1.	Continue Piperacillin/tazobactam 3.375 grams every 8 hours   2.	still waiting for Sputum AFB x3 q8hrs, unable to provide specimen->plan for bronch per pulm.   3.	Fungitell, Galactomannan antigen, Histoplasma urine Ag, cocci ab in lab.   4.	ESR, CRP elevated, LDH normal.   5.	Rheum and pulmonary on board.

## 2019-02-11 NOTE — PROGRESS NOTE ADULT - PROBLEM SELECTOR PLAN 3
C/w home BP meds  -Amlodipine 10mg qd  -Benazpril 10mg qd  -Metoprolol succ 100mg qd  -Triamterene-HCTZ 37.5-25mg BP acceptable in setting of active infection  -Amlodipine 10mg qd  -Benazpril 10mg qd  -Metoprolol succ 100mg qd  -Triamterene-HCTZ 37.5-25mg

## 2019-02-11 NOTE — PROGRESS NOTE ADULT - SUBJECTIVE AND OBJECTIVE BOX
Dr. Isaias Bell   Internal Medicine PGY-1  Pager #[phone]    Patient is a 68y old  Female who presents with a chief complaint of Cough, fevers, sweats, and chills a/w RUL cavitary mass (10 Feb 2019 10:45)      SUBJECTIVE / OVERNIGHT EVENTS:  AGAPITO ON.     MEDICATIONS  (STANDING):  amLODIPine   Tablet 10 milliGRAM(s) Oral daily  heparin  Injectable 5000 Unit(s) SubCutaneous every 8 hours  influenza   Vaccine 0.5 milliLiter(s) IntraMuscular once  lidocaine   Patch 1 Patch Transdermal every 24 hours  lisinopril 10 milliGRAM(s) Oral daily  melatonin 3 milliGRAM(s) Oral at bedtime  metoprolol tartrate 50 milliGRAM(s) Oral two times a day  piperacillin/tazobactam IVPB. 3.375 Gram(s) IV Intermittent every 8 hours  sodium chloride 3%  Inhalation 4 milliLiter(s) Inhalation every 8 hours  triamterene 37.5 mG/hydrochlorothiazide 25 mG Tablet 1 Tablet(s) Oral daily    MEDICATIONS  (PRN):  guaiFENesin    Syrup 100 milliGRAM(s) Oral every 6 hours PRN Cough      Vital Signs Last 24 Hrs  T(C): 37 (11 Feb 2019 06:06), Max: 37 (11 Feb 2019 06:06)  T(F): 98.6 (11 Feb 2019 06:06), Max: 98.6 (11 Feb 2019 06:06)  HR: 61 (11 Feb 2019 06:06) (61 - 70)  BP: 138/88 (11 Feb 2019 06:06) (122/72 - 138/88)  BP(mean): --  RR: 19 (11 Feb 2019 06:06) (18 - 19)  SpO2: 97% (11 Feb 2019 06:06) (96% - 97%)  CAPILLARY BLOOD GLUCOSE        I&O's Summary      PHYSICAL EXAM:  GENERAL: NAD, well-developed  HEAD:  Atraumatic, Normocephalic  EYES: EOMI, PERRLA, conjunctiva and sclera clear  NECK: Supple, No JVD  CHEST/LUNG: Clear to auscultation bilaterally; No wheeze  HEART: Regular rate and rhythm; No murmurs, rubs, or gallops  ABDOMEN: Soft, Nontender, Nondistended; Bowel sounds present  EXTREMITIES:  2+ Peripheral Pulses, No clubbing, cyanosis, or edema  PSYCH: AAOx3  NEUROLOGY: non-focal  SKIN: No rashes or lesions    LABS:                        10.4   7.28  )-----------( 422      ( 11 Feb 2019 05:45 )             34.0     02-10    143  |  101  |  10  ----------------------------<  119<H>  3.4<L>   |  26  |  0.91    Ca    9.5      10 Feb 2019 07:15                RADIOLOGY & ADDITIONAL TESTS:    Imaging Personally Reviewed:    Consultant(s) Notes Reviewed:      Care Discussed with Consultants/Other Providers: Dr. Isaias Bell   Internal Medicine PGY-1  Pager #[phone]    Patient is a 68y old  Female who presents with a chief complaint of Cough, fevers, sweats, and chills a/w RUL cavitary mass (10 Feb 2019 10:45)      SUBJECTIVE / OVERNIGHT EVENTS:  AGAPITO ON. Cough bouts decreasing in frequency. Patient unable to produce any sputum in spite of induction by RT. Will need bronch. No CP, sob, abdominal pain. Notes 3 loose watery BMs yesterday, but no recurrence today. No fevers, sweats, or chills.     MEDICATIONS  (STANDING):  amLODIPine   Tablet 10 milliGRAM(s) Oral daily  heparin  Injectable 5000 Unit(s) SubCutaneous every 8 hours  influenza   Vaccine 0.5 milliLiter(s) IntraMuscular once  lidocaine   Patch 1 Patch Transdermal every 24 hours  lisinopril 10 milliGRAM(s) Oral daily  melatonin 3 milliGRAM(s) Oral at bedtime  metoprolol tartrate 50 milliGRAM(s) Oral two times a day  piperacillin/tazobactam IVPB. 3.375 Gram(s) IV Intermittent every 8 hours  sodium chloride 3%  Inhalation 4 milliLiter(s) Inhalation every 8 hours  triamterene 37.5 mG/hydrochlorothiazide 25 mG Tablet 1 Tablet(s) Oral daily    MEDICATIONS  (PRN):  guaiFENesin    Syrup 100 milliGRAM(s) Oral every 6 hours PRN Cough      Vital Signs Last 24 Hrs  T(C): 37 (11 Feb 2019 06:06), Max: 37 (11 Feb 2019 06:06)  T(F): 98.6 (11 Feb 2019 06:06), Max: 98.6 (11 Feb 2019 06:06)  HR: 61 (11 Feb 2019 06:06) (61 - 70)  BP: 138/88 (11 Feb 2019 06:06) (122/72 - 138/88)  BP(mean): --  RR: 19 (11 Feb 2019 06:06) (18 - 19)  SpO2: 97% (11 Feb 2019 06:06) (96% - 97%)  CAPILLARY BLOOD GLUCOSE        I&O's Summary      PHYSICAL EXAM:  GENERAL: NAD, well-developed  HEAD:  Atraumatic, Normocephalic  EYES: EOMI, PERRLA, conjunctiva and sclera clear  NECK: Supple, No JVD  CHEST/LUNG: Clear to auscultation bilaterally; No wheeze  HEART: Regular rate and rhythm; No murmurs, rubs, or gallops  ABDOMEN: Soft, Nontender, Nondistended; Bowel sounds present  EXTREMITIES:  2+ Peripheral Pulses, No clubbing, cyanosis, or edema  PSYCH: AAOx3  NEUROLOGY: non-focal  SKIN: No rashes or lesions    LABS:                        10.4   7.28  )-----------( 422      ( 11 Feb 2019 05:45 )             34.0     02-10    143  |  101  |  10  ----------------------------<  119<H>  3.4<L>   |  26  |  0.91    Ca    9.5      10 Feb 2019 07:15                RADIOLOGY & ADDITIONAL TESTS:    Imaging Personally Reviewed:    Consultant(s) Notes Reviewed:      Care Discussed with Consultants/Other Providers:

## 2019-02-11 NOTE — PROGRESS NOTE ADULT - PROBLEM SELECTOR PLAN 1
- Acute cough, fevers, and night sweats and chills possibly 2/2 infectious causes (TB, staph, opportunistic infections) vs. rheumatoid nodule vs. malignancy. Could still be viral URI w/t coincidental lung mass. CT confirms clear cavitary lesion.   -fungitell ordered  -Induce sputum culture Q8H using hypertonic saline x3: results pending  -BCx negative x2  -ID recs  -Pulm recs  -Continue zosyn  -Trend fever  -Isolation precaution  - May need bronch - Acute cough, fevers, and night sweats and chills possibly 2/2 infectious causes (TB, staph, opportunistic infections) vs. rheumatoid nodule vs. malignancy. Could still be viral URI w/t coincidental lung mass. CT confirms clear cavitary lesion.   -fungitell pending  -Sputum induction failed, will need bronch - informed pulmonology  -BCx negative x2, cryptococcal ag neg, hiv neg  -ID recs  -Pulm recs  -Continue zosyn  -Trend fever  -Isolation precaution

## 2019-02-11 NOTE — PROGRESS NOTE ADULT - SUBJECTIVE AND OBJECTIVE BOX
68y old  Female who presents with a chief complaint of Cough, fevers, sweats, and chills a/w RUL cavitary mass (11 Feb 2019 07:42)      Interval history:  Afebrile, cough improved, more energy, eating well, no pleuritic chest pain. No diarrhea today.       Allergies:   adhesives (Blisters)  latex (Blisters)  Persantine (Blisters)      Antimicrobials:  piperacillin/tazobactam IVPB. 3.375 Gram(s) IV Intermittent every 8 hours      REVIEW OF SYSTEMS:  No SOB  No N/V, no abdominal pain  No dysuria  No rash.     Vital Signs Last 24 Hrs  T(C): 36.9 (02-11-19 @ 13:45), Max: 37 (02-11-19 @ 06:06)  T(F): 98.4 (02-11-19 @ 13:45), Max: 98.6 (02-11-19 @ 06:06)  HR: 71 (02-11-19 @ 13:45) (61 - 71)  BP: 125/78 (02-11-19 @ 13:45) (122/72 - 138/88)  RR: 18 (02-11-19 @ 13:45) (18 - 19)  SpO2: 97% (02-11-19 @ 13:45) (96% - 97%)      PHYSICAL EXAM:  Patient in no acute distress. AAOX3.  No icterus, no oral ulcers.  Cardiovascular: S1S2 normal.  Lungs: + air entry B/L lung fields.  Gastrointestinal: soft, nontender, nondistended.  Extremities: no edema.  IV sites not inflamed.                           10.4   7.28  )-----------( 422      ( 11 Feb 2019 05:45 )             34.0   02-11    142  |  104  |  11  ----------------------------<  104<H>  3.9   |  29  |  0.86    Ca    9.4      11 Feb 2019 05:45      CRYPT AG NEGATIVE, HIV NEGATIVE.     Culture - Fungal, Blood (collected 10 Feb 2019 07:50)  Source: BLOOD PERIPHERAL  Preliminary Report (11 Feb 2019 07:48):    CULTURE NEGATIVE FOR YEASTS AND MOLDS-PRELIMINARY RESULT    AFTER 24 HOURS INCUBATION

## 2019-02-12 ENCOUNTER — RESULT REVIEW (OUTPATIENT)
Age: 69
End: 2019-02-12

## 2019-02-12 LAB
ANION GAP SERPL CALC-SCNC: 13 MMO/L — SIGNIFICANT CHANGE UP (ref 7–14)
BUN SERPL-MCNC: 13 MG/DL — SIGNIFICANT CHANGE UP (ref 7–23)
CALCIUM SERPL-MCNC: 9.8 MG/DL — SIGNIFICANT CHANGE UP (ref 8.4–10.5)
CHLORIDE SERPL-SCNC: 103 MMOL/L — SIGNIFICANT CHANGE UP (ref 98–107)
CO2 SERPL-SCNC: 27 MMOL/L — SIGNIFICANT CHANGE UP (ref 22–31)
CREAT SERPL-MCNC: 0.88 MG/DL — SIGNIFICANT CHANGE UP (ref 0.5–1.3)
GLUCOSE SERPL-MCNC: 98 MG/DL — SIGNIFICANT CHANGE UP (ref 70–99)
GRAM STN SPT: SIGNIFICANT CHANGE UP
HCT VFR BLD CALC: 35.7 % — SIGNIFICANT CHANGE UP (ref 34.5–45)
HGB BLD-MCNC: 11 G/DL — LOW (ref 11.5–15.5)
MCHC RBC-ENTMCNC: 27.4 PG — SIGNIFICANT CHANGE UP (ref 27–34)
MCHC RBC-ENTMCNC: 30.8 % — LOW (ref 32–36)
MCV RBC AUTO: 88.8 FL — SIGNIFICANT CHANGE UP (ref 80–100)
NRBC # FLD: 0 K/UL — LOW (ref 25–125)
PLATELET # BLD AUTO: 425 K/UL — HIGH (ref 150–400)
PMV BLD: 9 FL — SIGNIFICANT CHANGE UP (ref 7–13)
POTASSIUM SERPL-MCNC: 3.8 MMOL/L — SIGNIFICANT CHANGE UP (ref 3.5–5.3)
POTASSIUM SERPL-SCNC: 3.8 MMOL/L — SIGNIFICANT CHANGE UP (ref 3.5–5.3)
RBC # BLD: 4.02 M/UL — SIGNIFICANT CHANGE UP (ref 3.8–5.2)
RBC # FLD: 14.5 % — SIGNIFICANT CHANGE UP (ref 10.3–14.5)
SODIUM SERPL-SCNC: 143 MMOL/L — SIGNIFICANT CHANGE UP (ref 135–145)
SPECIMEN SOURCE: SIGNIFICANT CHANGE UP
WBC # BLD: 5.85 K/UL — SIGNIFICANT CHANGE UP (ref 3.8–10.5)
WBC # FLD AUTO: 5.85 K/UL — SIGNIFICANT CHANGE UP (ref 3.8–10.5)

## 2019-02-12 PROCEDURE — 31624 DX BRONCHOSCOPE/LAVAGE: CPT | Mod: GC

## 2019-02-12 PROCEDURE — 99222 1ST HOSP IP/OBS MODERATE 55: CPT | Mod: GC,25

## 2019-02-12 PROCEDURE — 88112 CYTOPATH CELL ENHANCE TECH: CPT | Mod: 26

## 2019-02-12 PROCEDURE — 88312 SPECIAL STAINS GROUP 1: CPT | Mod: 26

## 2019-02-12 PROCEDURE — 88305 TISSUE EXAM BY PATHOLOGIST: CPT | Mod: 26

## 2019-02-12 PROCEDURE — 99233 SBSQ HOSP IP/OBS HIGH 50: CPT | Mod: GC

## 2019-02-12 PROCEDURE — 99232 SBSQ HOSP IP/OBS MODERATE 35: CPT

## 2019-02-12 RX ORDER — ACETAMINOPHEN 500 MG
1000 TABLET ORAL ONCE
Qty: 0 | Refills: 0 | Status: DISCONTINUED | OUTPATIENT
Start: 2019-02-12 | End: 2019-02-12

## 2019-02-12 RX ORDER — SODIUM CHLORIDE 9 MG/ML
1000 INJECTION, SOLUTION INTRAVENOUS
Qty: 0 | Refills: 0 | Status: DISCONTINUED | OUTPATIENT
Start: 2019-02-12 | End: 2019-02-12

## 2019-02-12 RX ADMIN — Medication 1 TABLET(S): at 06:54

## 2019-02-12 RX ADMIN — AMLODIPINE BESYLATE 10 MILLIGRAM(S): 2.5 TABLET ORAL at 06:41

## 2019-02-12 RX ADMIN — PIPERACILLIN AND TAZOBACTAM 25 GRAM(S): 4; .5 INJECTION, POWDER, LYOPHILIZED, FOR SOLUTION INTRAVENOUS at 16:18

## 2019-02-12 RX ADMIN — Medication 50 MILLIGRAM(S): at 06:41

## 2019-02-12 RX ADMIN — Medication 3 MILLIGRAM(S): at 21:33

## 2019-02-12 RX ADMIN — PIPERACILLIN AND TAZOBACTAM 25 GRAM(S): 4; .5 INJECTION, POWDER, LYOPHILIZED, FOR SOLUTION INTRAVENOUS at 23:54

## 2019-02-12 RX ADMIN — PIPERACILLIN AND TAZOBACTAM 25 GRAM(S): 4; .5 INJECTION, POWDER, LYOPHILIZED, FOR SOLUTION INTRAVENOUS at 04:01

## 2019-02-12 RX ADMIN — SODIUM CHLORIDE 100 MILLILITER(S): 9 INJECTION, SOLUTION INTRAVENOUS at 16:00

## 2019-02-12 RX ADMIN — Medication 50 MILLIGRAM(S): at 18:17

## 2019-02-12 RX ADMIN — LISINOPRIL 10 MILLIGRAM(S): 2.5 TABLET ORAL at 06:41

## 2019-02-12 RX ADMIN — LIDOCAINE 1 PATCH: 4 CREAM TOPICAL at 21:29

## 2019-02-12 NOTE — PROGRESS NOTE ADULT - SUBJECTIVE AND OBJECTIVE BOX
Dr. Isaias Bell   Internal Medicine PGY-1  Pager #[phone]([facility])/[phone]([facility])    Patient is a 68y old  Female who presents with a chief complaint of Cough, fevers, sweats, and chills a/w RUL cavitary mass (11 Feb 2019 17:32)      SUBJECTIVE / OVERNIGHT EVENTS:  AGAPITO ON. Quant gold neg.     MEDICATIONS  (STANDING):  amLODIPine   Tablet 10 milliGRAM(s) Oral daily  heparin  Injectable 5000 Unit(s) SubCutaneous every 8 hours  influenza   Vaccine 0.5 milliLiter(s) IntraMuscular once  lidocaine   Patch 1 Patch Transdermal every 24 hours  lisinopril 10 milliGRAM(s) Oral daily  melatonin 3 milliGRAM(s) Oral at bedtime  metoprolol tartrate 50 milliGRAM(s) Oral two times a day  piperacillin/tazobactam IVPB. 3.375 Gram(s) IV Intermittent every 8 hours  sodium chloride 3%  Inhalation 4 milliLiter(s) Inhalation every 8 hours  triamterene 37.5 mG/hydrochlorothiazide 25 mG Tablet 1 Tablet(s) Oral daily    MEDICATIONS  (PRN):  guaiFENesin    Syrup 100 milliGRAM(s) Oral every 6 hours PRN Cough      Vital Signs Last 24 Hrs  T(C): 36.8 (12 Feb 2019 06:05), Max: 37.1 (11 Feb 2019 21:10)  T(F): 98.3 (12 Feb 2019 06:05), Max: 98.7 (11 Feb 2019 21:10)  HR: 63 (12 Feb 2019 06:05) (63 - 72)  BP: 153/91 (12 Feb 2019 06:05) (125/78 - 153/91)  BP(mean): --  RR: 16 (12 Feb 2019 06:05) (16 - 18)  SpO2: 98% (12 Feb 2019 06:05) (94% - 98%)  CAPILLARY BLOOD GLUCOSE        I&O's Summary      PHYSICAL EXAM:  GENERAL: NAD, well-developed  HEAD:  Atraumatic, Normocephalic  EYES: EOMI, PERRLA, conjunctiva and sclera clear  NECK: Supple, No JVD  CHEST/LUNG: Clear to auscultation bilaterally; No wheeze  HEART: Regular rate and rhythm; No murmurs, rubs, or gallops  ABDOMEN: Soft, Nontender, Nondistended; Bowel sounds present  EXTREMITIES:  2+ Peripheral Pulses, No clubbing, cyanosis, or edema  PSYCH: AAOx3  NEUROLOGY: non-focal  SKIN: No rashes or lesions    LABS:                        10.4   7.28  )-----------( 422      ( 11 Feb 2019 05:45 )             34.0     02-11    142  |  104  |  11  ----------------------------<  104<H>  3.9   |  29  |  0.86    Ca    9.4      11 Feb 2019 05:45                RADIOLOGY & ADDITIONAL TESTS:    Imaging Personally Reviewed:    Consultant(s) Notes Reviewed:      Care Discussed with Consultants/Other Providers: Dr. Isaias Bell   Internal Medicine PGY-1  Pager #[phone]([facility])/[phone]([facility])    Patient is a 68y old  Female who presents with a chief complaint of Cough, fevers, sweats, and chills a/w RUL cavitary mass (11 Feb 2019 17:32)      SUBJECTIVE / OVERNIGHT EVENTS:  AGAPITO ON. Quant gold neg. Cough improved, no SOB. No hemoptysis. No diarrhea.     MEDICATIONS  (STANDING):  amLODIPine   Tablet 10 milliGRAM(s) Oral daily  heparin  Injectable 5000 Unit(s) SubCutaneous every 8 hours  influenza   Vaccine 0.5 milliLiter(s) IntraMuscular once  lidocaine   Patch 1 Patch Transdermal every 24 hours  lisinopril 10 milliGRAM(s) Oral daily  melatonin 3 milliGRAM(s) Oral at bedtime  metoprolol tartrate 50 milliGRAM(s) Oral two times a day  piperacillin/tazobactam IVPB. 3.375 Gram(s) IV Intermittent every 8 hours  sodium chloride 3%  Inhalation 4 milliLiter(s) Inhalation every 8 hours  triamterene 37.5 mG/hydrochlorothiazide 25 mG Tablet 1 Tablet(s) Oral daily    MEDICATIONS  (PRN):  guaiFENesin    Syrup 100 milliGRAM(s) Oral every 6 hours PRN Cough      Vital Signs Last 24 Hrs  T(C): 36.8 (12 Feb 2019 06:05), Max: 37.1 (11 Feb 2019 21:10)  T(F): 98.3 (12 Feb 2019 06:05), Max: 98.7 (11 Feb 2019 21:10)  HR: 63 (12 Feb 2019 06:05) (63 - 72)  BP: 153/91 (12 Feb 2019 06:05) (125/78 - 153/91)  BP(mean): --  RR: 16 (12 Feb 2019 06:05) (16 - 18)  SpO2: 98% (12 Feb 2019 06:05) (94% - 98%)  CAPILLARY BLOOD GLUCOSE        I&O's Summary      PHYSICAL EXAM:  GENERAL: NAD, well-developed  HEAD:  Atraumatic, Normocephalic  EYES: EOMI, PERRLA, conjunctiva and sclera clear  NECK: Supple, No JVD  CHEST/LUNG: Clear to auscultation bilaterally; No wheeze  HEART: Regular rate and rhythm; No murmurs, rubs, or gallops  ABDOMEN: Soft, Nontender, Nondistended; Bowel sounds present  EXTREMITIES:  2+ Peripheral Pulses, No clubbing, cyanosis, or edema  PSYCH: AAOx3  NEUROLOGY: non-focal  SKIN: No rashes or lesions    LABS:                          11.0   5.85  )-----------( 425      ( 12 Feb 2019 06:00 )             35.7       02-12    143  |  103  |  13  ----------------------------<  98  3.8   |  27  |  0.88    Ca    9.8      12 Feb 2019 06:00                        Lactate Trend            CAPILLARY BLOOD GLUCOSE        Quantiferon Plus TB (02.09.19 @ 06:40)    Quantiferon TB Plus: NEGATIVE: The magnitude of the measured IFN gamma level cannot be  correlated to stage or degree of Infection, level of  immune responsiveness, or likelihood for progression to  active disease.  Negative means no interferon-gamma response to M.  tuberculosis antigens was detected. Infection with M.  tuberculosis is NOT likely.  Positive means interferon-gamma response to M.  tuberculosis antigens was detected suggesting infection  with M. tuberculosis. False positive results may occur in  patients with prior infection with M. marinum, M. szulgai  or M. kansasii.  Indeterminate means likelihood for M. tuberculosis  infection is uncertain.  The results of this test cannot be used alone to make a  diagnosis of active or latent tuberculosis infection. To  make either diagnosis, the patient’s entire medical  presentation must be considered. For further information  on the interpretation of this test refer to  (http://www.cdc.gov/nchstp/tb/).    Quantiferon TB Plus Nil: 0.06 IU/mL    Quantiferon TB Plus TB1 minus Nil: 0.01 IU/mL    Quantiferon TB Plus TB2 minus Nil: 0.01 IU/mL    Quant TB Plus Mitogen minus Nil: 5.45 IU/mL                  RADIOLOGY & ADDITIONAL TESTS:    Imaging Personally Reviewed:    Consultant(s) Notes Reviewed:      Care Discussed with Consultants/Other Providers:

## 2019-02-12 NOTE — PROGRESS NOTE ADULT - PROBLEM SELECTOR PLAN 1
- Acute cough, fevers, and night sweats and chills possibly 2/2 infectious causes (TB, staph, opportunistic infections) vs. rheumatoid nodule vs. malignancy. Could still be viral URI w/t coincidental lung mass. CT confirms clear cavitary lesion.   -fungitell pending  -Sputum induction failed, BAL for AFB stain today  -Repeat Quant gold neg  -BCx negative x2, cryptococcal ag neg, hiv neg  -ID recs  -Pulm recs  -Continue zosyn  -Trend fever  -Isolation precaution

## 2019-02-12 NOTE — PROGRESS NOTE ADULT - ASSESSMENT
69 yo F w/t a PMHx of RA on (Golimumab, plaquenil, leflunomide), HTN, provoked DVT, R sided thyroid nodules who p/w a 9 day course of pleuritic CP on inspiration, cough, and night sweats with fever and rigors 2/2 infectious vs malignancy vs RA nodules vs PE.   CT chest showing cavitary right upper lobe lesion. PE ruled out outpatient with CTA.   She has no risk factors for TB as she was born and raised in [place] and only lived in [facility] for a few years. No known exposures to TB, no family members with TB, never been incarcerated or worked for the jail system. She is a retired xray technician.  Ddx include TB vs Invasive fungal infections, including histoplasmosis, coccidioidomycosis, candidiasis, aspergillosis, and pneumocystosis which have been described with the use of golimumab. Furthermore, leflunomide can also increase risk of infections such as TB, aspergillosis and PJP.   Overall Cavitary pneumonia in an immunosuppressed pt, r/o TB.       Recommendations:   1.	Continue Piperacillin/tazobactam 3.375 grams every 8 hours   2.	planned for bronch today. Follow up cx.   3.	Fungitell, Galactomannan antigen, Histoplasma urine Ag, cocci ab in lab.

## 2019-02-12 NOTE — PROGRESS NOTE ADULT - PROBLEM SELECTOR PLAN 3
BP acceptable in setting of active infection  -Amlodipine 10mg qd  -Benazpril 10mg qd  -Metoprolol succ 100mg qd  -Triamterene-HCTZ 37.5-25mg

## 2019-02-12 NOTE — PROGRESS NOTE ADULT - SUBJECTIVE AND OBJECTIVE BOX
Interval Events:  Doing well this morning  Cough improved  On Zosyn (since 2/9)  s/p bronch today with BAL    REVIEW OF SYSTEMS:  [x] All other systems negative  [ ] Unable to assess ROS because ________    OBJECTIVE:  ICU Vital Signs Last 24 Hrs  T(C): 36.2 (12 Feb 2019 17:00), Max: 37.2 (12 Feb 2019 13:51)  T(F): 97.1 (12 Feb 2019 17:00), Max: 98.9 (12 Feb 2019 13:51)  HR: 72 (12 Feb 2019 17:15) (63 - 75)  BP: 119/67 (12 Feb 2019 17:15) (91/57 - 157/98)  BP(mean): 80 (12 Feb 2019 17:15) (65 - 86)  ABP: --  ABP(mean): --  RR: 15 (12 Feb 2019 17:15) (14 - 21)  SpO2: 99% (12 Feb 2019 17:15) (94% - 100%)        02-12 @ 07:01  -  02-12 @ 17:46  --------------------------------------------------------  IN: 340 mL / OUT: 0 mL / NET: 340 mL      CAPILLARY BLOOD GLUCOSE          PHYSICAL EXAM:  General: NAD  Neck: No JVD  Respiratory: CTAB  Cardiovascular: RRR, no murmur  Abdomen: Soft, nontender  Skin: Warm  Neurological: A&Ox3      [facility] MEDICATIONS:  heparin  Injectable 5000 Unit(s) SubCutaneous every 8 hours    piperacillin/tazobactam IVPB. 3.375 Gram(s) IV Intermittent every 8 hours    amLODIPine   Tablet 10 milliGRAM(s) Oral daily  lisinopril 10 milliGRAM(s) Oral daily  metoprolol tartrate 50 milliGRAM(s) Oral two times a day  triamterene 37.5 mG/hydrochlorothiazide 25 mG Tablet 1 Tablet(s) Oral daily      guaiFENesin    Syrup 100 milliGRAM(s) Oral every 6 hours PRN  sodium chloride 3%  Inhalation 4 milliLiter(s) Inhalation every 8 hours    acetaminophen  IVPB .. 1000 milliGRAM(s) IV Intermittent once PRN  melatonin 3 milliGRAM(s) Oral at bedtime          lactated ringers. 1000 milliLiter(s) IV Continuous <Continuous>    influenza   Vaccine 0.5 milliLiter(s) IntraMuscular once    lidocaine   Patch 1 Patch Transdermal every 24 hours        LABS:                        11.0   5.85  )-----------( 425      ( 12 Feb 2019 06:00 )             35.7     Hgb Trend: 11.0<--, 10.4<--, 10.4<--, 10.4<--, 10.9<--  02-12    143  |  103  |  13  ----------------------------<  98  3.8   |  27  |  0.88    Ca    9.8      12 Feb 2019 06:00      Creatinine Trend: 0.88<--, 0.86<--, 0.91<--, 0.81<--, 0.83<--            MICROBIOLOGY:     RADIOLOGY:  [ ] Reviewed and interpreted by me    ASSESSMENT AND RECOMMENDATION:    68F with rheumatoid arthritis on immunosuppression here with subjective fever/chills and cough for 9 days.   Has cavitary RUL nodule, which had broad differential given her disease and immunosuppression.     Will follow up BAL results   Continue Sera for now    Umesh Newsome MD  Pulmonary and Critical Care Fellow  [phone]

## 2019-02-12 NOTE — PROGRESS NOTE ADULT - SUBJECTIVE AND OBJECTIVE BOX
68y old  Female who presents with a chief complaint of Cough, fevers, sweats, and chills a/w RUL cavitary mass (12 Feb 2019 07:19)      Interval history:  Afebrile, improved cough, no SOB, no N/V/D, no abdominal pain, denies dysuria.       Allergies:   adhesives (Blisters)  latex (Blisters)  Persantine (Blisters)      Antimicrobials:  piperacillin/tazobactam IVPB. 3.375 Gram(s) IV Intermittent every 8 hours      REVIEW OF SYSTEMS:  No chest pain   No rash.   No headaches.       Vital Signs Last 24 Hrs  T(C): 36.8 (02-12-19 @ 14:26), Max: 37.2 (02-12-19 @ 13:51)  T(F): 98.3 (02-12-19 @ 14:26), Max: 98.9 (02-12-19 @ 13:51)  HR: 70 (02-12-19 @ 14:26) (63 - 72)  BP: 157/98 (02-12-19 @ 14:26) (129/79 - 157/98)  RR: 18 (02-12-19 @ 14:26) (16 - 18)  SpO2: 100% (02-12-19 @ 14:26) (94% - 100%)      PHYSICAL EXAM:  Patient in no acute distress. AAOX3.  No icterus, no oral ulcers.  Cardiovascular: S1S2 normal.  Lungs: good air entry B/L lung fields.  Gastrointestinal: soft, nontender, nondistended.  Extremities: no edema.  IV sites not inflamed.                             11.0   5.85  )-----------( 425      ( 12 Feb 2019 06:00 )             35.7   02-12    143  |  103  |  13  ----------------------------<  98  3.8   |  27  |  0.88    Ca    9.8      12 Feb 2019 06:00        Culture - Fungal, Blood (collected 10 Feb 2019 07:50)  Source: BLOOD PERIPHERAL  Preliminary Report (12 Feb 2019 07:48):    CULTURE NEGATIVE FOR YEASTS AND MOLDS-PRELIMINARY RESULT    AFTER 48 HOURS INCUBATION

## 2019-02-13 ENCOUNTER — TRANSCRIPTION ENCOUNTER (OUTPATIENT)
Age: 69
End: 2019-02-13

## 2019-02-13 LAB
BACTERIA BLD CULT: SIGNIFICANT CHANGE UP
BACTERIA BLD CULT: SIGNIFICANT CHANGE UP
CULTURE - ACID FAST SMEAR CONCENTRATED: SIGNIFICANT CHANGE UP
GALACTOMANNAN AG SERPL-ACNC: <0.5 — SIGNIFICANT CHANGE UP
SPECIMEN SOURCE: SIGNIFICANT CHANGE UP

## 2019-02-13 PROCEDURE — 99232 SBSQ HOSP IP/OBS MODERATE 35: CPT | Mod: GC

## 2019-02-13 PROCEDURE — 99233 SBSQ HOSP IP/OBS HIGH 50: CPT | Mod: GC

## 2019-02-13 PROCEDURE — 99232 SBSQ HOSP IP/OBS MODERATE 35: CPT

## 2019-02-13 RX ADMIN — LIDOCAINE 1 PATCH: 4 CREAM TOPICAL at 08:48

## 2019-02-13 RX ADMIN — LIDOCAINE 1 PATCH: 4 CREAM TOPICAL at 22:06

## 2019-02-13 RX ADMIN — LIDOCAINE 1 PATCH: 4 CREAM TOPICAL at 08:13

## 2019-02-13 RX ADMIN — AMLODIPINE BESYLATE 10 MILLIGRAM(S): 2.5 TABLET ORAL at 06:05

## 2019-02-13 RX ADMIN — Medication 3 MILLIGRAM(S): at 22:07

## 2019-02-13 RX ADMIN — PIPERACILLIN AND TAZOBACTAM 25 GRAM(S): 4; .5 INJECTION, POWDER, LYOPHILIZED, FOR SOLUTION INTRAVENOUS at 08:47

## 2019-02-13 RX ADMIN — PIPERACILLIN AND TAZOBACTAM 25 GRAM(S): 4; .5 INJECTION, POWDER, LYOPHILIZED, FOR SOLUTION INTRAVENOUS at 17:11

## 2019-02-13 RX ADMIN — Medication 50 MILLIGRAM(S): at 06:05

## 2019-02-13 RX ADMIN — Medication 50 MILLIGRAM(S): at 17:11

## 2019-02-13 RX ADMIN — Medication 1 TABLET(S): at 06:05

## 2019-02-13 RX ADMIN — LISINOPRIL 10 MILLIGRAM(S): 2.5 TABLET ORAL at 06:05

## 2019-02-13 NOTE — PROGRESS NOTE ADULT - PROBLEM SELECTOR PLAN 1
- Acute cough, fevers, and night sweats and chills possibly 2/2 infectious causes (TB, staph, opportunistic infections) vs. rheumatoid nodule vs. malignancy. Could still be viral URI w/t coincidental lung mass. CT confirms clear cavitary lesion.   -fungitell pending  -Initial sputum gram stain neg, AFB not back  -Repeat Quant gold neg  -BCx negative x2, cryptococcal ag neg, hiv neg  -ID recs  -Pulm recs  -Continue zosyn  -Trend fever  -Isolation precaution - Acute cough, fevers, and night sweats and chills possibly 2/2 infectious causes (TB, staph, opportunistic infections) vs. rheumatoid nodule vs. malignancy. Could still be viral URI w/t coincidental lung mass. CT confirms clear cavitary lesion.   -fungitell pending  -Initial sputum gram stain neg, AFB pending  -Repeat Quant gold neg  -BCx negative x2, cryptococcal ag neg, hiv neg  -ID recs  -Pulm recs  -Continue zosyn  -Trend fever  -Isolation precaution - Acute cough, fevers, and night sweats and chills possibly 2/2 infectious causes (TB, staph, opportunistic infections) vs. rheumatoid nodule vs. malignancy. Could still be viral URI w/t coincidental lung mass. CT confirms clear cavitary lesion.   -fungitell pending  -Initial sputum gram stain neg, AFB smear without acid fast organisms  -Repeat Quant gold neg  -BCx negative x2, cryptococcal ag neg, hiv neg  -ID recs  -Pulm recs  -Continue zosyn  -Trend fever  -Isolation precaution

## 2019-02-13 NOTE — PROGRESS NOTE ADULT - SUBJECTIVE AND OBJECTIVE BOX
ANESTHESIA POSTOP CHECK    68y Female POSTOP DAY 1     Vital Signs Last 24 Hrs  T(C): 36.6 (13 Feb 2019 05:40), Max: 37.2 (12 Feb 2019 13:51)  T(F): 97.8 (13 Feb 2019 05:40), Max: 98.9 (12 Feb 2019 13:51)  HR: 75 (13 Feb 2019 05:40) (65 - 82)  BP: 136/86 (13 Feb 2019 05:40) (91/57 - 157/98)  BP(mean): 80 (12 Feb 2019 17:15) (65 - 86)  RR: 18 (13 Feb 2019 05:40) (14 - 21)  SpO2: 97% (13 Feb 2019 05:40) (94% - 100%)  I&O's Summary    12 Feb 2019 07:01  -  13 Feb 2019 07:00  --------------------------------------------------------  IN: 340 mL / OUT: 0 mL / NET: 340 mL        [X ] NO APPARENT ANESTHESIA COMPLICATIONS      Comments:

## 2019-02-13 NOTE — PROGRESS NOTE ADULT - ASSESSMENT
69 yo F w/t a PMHx of RA on (Golimumab, plaquenil, leflunomide), HTN, provoked DVT, R sided thyroid nodules who p/w a 9 day course of pleuritic CP on inspiration, cough, and night sweats with fever and rigors 2/2 infectious vs malignancy vs RA nodules vs PE.   CT chest showing cavitary right upper lobe lesion. PE ruled out outpatient with CTA.   She has no risk factors for TB as she was born and raised in [place] and only lived in [place] for a few years. No known exposures to TB, no family members with TB, never been incarcerated or worked for the long-term system. She is a retired xray technician.  Ddx include TB vs Invasive fungal infections, including histoplasmosis, coccidioidomycosis, candidiasis, aspergillosis, and pneumocystosis which have been described with the use of golimumab. Furthermore, leflunomide can also increase risk of infections such as TB, aspergillosis and PJP.   S/p bronchoscopy on 2/12--Culture NGTD; AFB pending  Remain clinically well today, no fevers, no signs of sepsis  Overall Cavitary pneumonia in an immunosuppressed pt, r/o TB    Recommendations:   1.	Continue Piperacillin/tazobactam 3.375 grams every 8 hours for now  2.	F/U bronch results; AFB smear  3.	F/U Fungitell, Galactomannan antigen, Histoplasma urine Ag, cocci ab (in lab)  4.	Maintain airborne isolation for now pending AFB result    Isaias Mao MD  Pager [phone]  After 5pm and on weekends call [phone]

## 2019-02-13 NOTE — DISCHARGE NOTE ADULT - PATIENT PORTAL LINK FT
You can access the [facility] Patient Portal, offered by [facility], by registering with the following website: [email or web address]

## 2019-02-13 NOTE — DISCHARGE NOTE ADULT - HOSPITAL COURSE
69 yo F w/t a PMHx of RA on (Simponi, plaquenil, leflunomide), HTN, provoked DVT, R sided thyroid nodules who p/w a 9 day course of pleuritic CP on inspiration, cough, and night sweats with fever and rigors. Of note the symptoms started with just pain on inspiration and then the cough, night sweats, and fever developed. The cough is non-productive (no hemoptysis). She notes a negative TB screen many years ago when she first started immunomodulatory therapy. Denies recent travel, notes that her granddaughter w/t hidradenitis suppurativa started immunosuppressive therapy and has been sick recently. Of note, she saw her PCP on 1/30/19 who ordered a d-dimer which was positive and CXR which was read as clear. Her symptoms did not improve so she was started on cefpodoxime and azithromycin on Mon 2/4/19. She continued to have cough, night sweats, and fevers in spite of antibiotics so a chest CTA was ordered which, per allscripts revealed a pleural based soft tissue density w/t ground glass halo surrounding it in the RUL (2.7cm x 2.7cm x 3.3cm) so he called her and told her to come to the ED. No recent travel. Born in [place]. She denies N/V and diarrhea. No dysuria.    ED Course:  Vitals   T 97.6  HR 71 /86  RR 18  SpO2 100%  In the ED, CXR was repeated and was read as a possible cavitary lesion in the RUL. He was given 750mg levofloxacin x1, and 500 NS bolus. No leukocytosis. RVP neg. She was then admitted to medicine and put on isolation precautions to r/o TB.    Hospital Course:  All of her RA medications were held and she was started initially on ceftriaxone. Pulmonology, rheumatology, and infectious disease were consulted. She was then switched to IV zosyn. Blood cultures remained negative. Urinary legionella was neg. Fungal Cx was neg. She remained afebrile and her cough improved, however, respiratory therapy was unable to induce sputum by hypertonic saline so she went for BAL. Gram stain showed many WBCs, but no bacteria and a smear showed no AFB. She was then taken off of airborne precautions and discharged on ___. She will followup on her BAL culture results in 6 weeks with ID. 69 yo F w/t a PMHx of RA on (Simponi, plaquenil, leflunomide), HTN, provoked DVT, R sided thyroid nodules who p/w a 9 day course of pleuritic CP on inspiration, cough, and night sweats with fever and rigors. Of note the symptoms started with just pain on inspiration and then the cough, night sweats, and fever developed. The cough is non-productive (no hemoptysis). She notes a negative TB screen many years ago when she first started immunomodulatory therapy. Denies recent travel, notes that her granddaughter w/t hidradenitis suppurativa started immunosuppressive therapy and has been sick recently. Of note, she saw her PCP on 1/30/19 who ordered a d-dimer which was positive and CXR which was read as clear. Her symptoms did not improve so she was started on cefpodoxime and azithromycin on Mon 2/4/19. She continued to have cough, night sweats, and fevers in spite of antibiotics so a chest CTA was ordered which, per allscripts revealed a pleural based soft tissue density w/t ground glass halo surrounding it in the RUL (2.7cm x 2.7cm x 3.3cm) so he called her and told her to come to the ED. No recent travel. Born in [place]. She denies N/V and diarrhea. No dysuria.    ED Course:  Vitals   T 97.6  HR 71 /86  RR 18  SpO2 100%  In the ED, CXR was repeated and was read as a possible cavitary lesion in the RUL. He was given 750mg levofloxacin x1, and 500 NS bolus. No leukocytosis. RVP neg. She was then admitted to medicine and put on isolation precautions to r/o TB.    Hospital Course:  All of her RA medications were held and she was started initially on ceftriaxone. Pulmonology, rheumatology, and infectious disease were consulted. She was then switched to IV zosyn. Blood cultures remained negative. Urinary legionella was neg. Fungal Cx was neg. She remained afebrile and her cough improved, however, respiratory therapy was unable to induce sputum by hypertonic saline so she went for BAL. Gram stain showed many WBCs, but no bacteria and a smear showed no AFB. She was then taken off of airborne precautions and discharged on Moxifloxacin 400mg once a day till 3/4/19 and followup with Dr. López of ID and she will followup on her BAL culture results in 6 weeks with ID. She also will followup with rheumatology and for now would hold leflunomide Infection control reviewed her case and no need for clearance through health department since initial sputum results are negative for TB and AFB. Patient is stable for discharge. 67 yo F w/t a PMHx of RA on (Simponi, plaquenil, leflunomide), HTN, provoked DVT, R sided thyroid nodules who p/w a 9 day course of pleuritic CP on inspiration, cough, and night sweats with fever and rigors. Of note the symptoms started with just pain on inspiration and then the cough, night sweats, and fever developed. The cough is non-productive (no hemoptysis). She notes a negative TB screen many years ago when she first started immunomodulatory therapy. Denies recent travel, notes that her granddaughter w/t hidradenitis suppurativa started immunosuppressive therapy and has been sick recently. Of note, she saw her PCP on 1/30/19 who ordered a d-dimer which was positive and CXR which was read as clear. Her symptoms did not improve so she was started on cefpodoxime and azithromycin on Mon 2/4/19. She continued to have cough, night sweats, and fevers in spite of antibiotics so a chest CTA was ordered which, per allscripts revealed a pleural based soft tissue density w/t ground glass halo surrounding it in the RUL (2.7cm x 2.7cm x 3.3cm) so he called her and told her to come to the ED. No recent travel. Born in [place]. She denies N/V and diarrhea. No dysuria.    ED Course:  Vitals   T 97.6  HR 71 /86  RR 18  SpO2 100%  In the ED, CXR was repeated and was read as a possible cavitary lesion in the RUL. He was given 750mg levofloxacin x1, and 500 NS bolus. No leukocytosis. RVP neg. She was then admitted to medicine and put on isolation precautions to r/o TB.    Hospital Course:  All of her RA medications were held and she was started initially on ceftriaxone. Pulmonology, rheumatology, and infectious disease were consulted. She was then switched to IV zosyn. Blood cultures remained negative. Urinary legionella was neg. Fungal Cx was neg. She remained afebrile and her cough improved, however, respiratory therapy team was unable to induce sputum by hypertonic saline so she underwent for alveolar lavage by bronchoscopy. Gram stain of the lavage showed many WBCs, but no bacteria and a smear showed no AFB. She was then taken off of airborne precautions and discharged on Moxifloxacin 400mg once a day till 3/4/19 and followup with Dr. López of ID. She will followup on her BAL culture results in 6 weeks with ID. She also will followup with rheumatology and for now would hold leflunomide Infection control reviewed her case and no need for clearance through health department since initial sputum results are negative for TB and AFB. Patient is stable for discharge.   Of note, patient incidentally found to have thyroid nodule on imaging which will need further evaluation as outpatient.

## 2019-02-13 NOTE — DISCHARGE NOTE ADULT - PLAN OF CARE
No further cough You came into the hospital with cough, fevers, sweats and chills. This was due to a lung infection. You were treated with IV antibiotics for this. Please finish your antibiotics and followup with your primary care provider in 1-2 weeks. Your chest CT showed a cavitation in your right upper lung. Due to this problem, you had to be ruled out for active infectious tuberculosis. Your test result was negative. You will need to follow up on the final result in 6 weeks with the infectious disease doctor who saw you in the hospital,  Your RA medications were stopped. Please see your rheumatologist within 1 week to discuss when it will be safe for you to resume your therapy. You have high blood pressure. Please continue to your home blood pressure medications as prescribed. You have a thyroid nodule. Please keep your scheduled appointment to have this followed up with an ultrasound. Would need to followup with ID and PMD Your chest CT showed a cavitation in your right upper lung. Due to this problem, you had to be ruled out for active infectious tuberculosis. Your test result was negative. You will need to follow up on the final result in 6 weeks with the infectious disease doctor who saw you in the hospital, Dr. Kalie López [address] [place] [address] [phone] on 3/4/19 Please followup with Dr. Barrow within 1 week of discharge Your RA medications were stopped. Please see your rheumatologist within 1 week to discuss when it will be safe for you to resume your therapy. Holding the Leflunomide but continue the Plaquenil Please continue your home BP meds and followup with your PMD Please followup with your PMD

## 2019-02-13 NOTE — DISCHARGE NOTE ADULT - CARE PROVIDERS DIRECT ADDRESSES
[email or web address]

## 2019-02-13 NOTE — DISCHARGE NOTE ADULT - MEDICATION SUMMARY - MEDICATIONS TO TAKE
I will START or STAY ON the medications listed below when I get home from the hospital:    hydrocodone-acetaminophen 10 mg-325 mg oral tablet  -- 1 tab(s) by mouth every 6 hours, As Needed  -- Indication: For Pain    benazepril 10 mg oral tablet  -- 1 tab(s) by mouth once a day  -- Indication: For Hypertension    gabapentin 300 mg oral capsule  -- 1 cap(s) by mouth once a day, As Needed  -- Indication: For Pain    triamterene-hydrochlorothiazide 37.5 mg-25 mg oral tablet  -- 1 tab(s) by mouth once a day  -- Indication: For Hypertension    Plaquenil 200 mg oral tablet  -- 1 tab(s) by mouth once a day  -- Indication: For Rheumatoid arthritis    metoprolol succinate 100 mg oral tablet, extended release  -- 1 tab(s) by mouth once a day  -- Indication: For Hypertension    amLODIPine 10 mg oral tablet  -- 1 tab(s) by mouth once a day  -- Indication: For Hypertension    leflunomide 20 mg oral tablet  -- 1 tab(s) by mouth once a day  -- Indication: For Rheumatoid arthritis    moxifloxacin 400 mg oral tablet  -- 1 tab(s) by mouth once a day MDD:Please stop by 3/14/19  -- Do not take dairy products, antacids, or iron preparations within one hour of this medication.  Finish all this medication unless otherwise directed by prescriber.  May cause drowsiness or dizziness.  Obtain medical advice before taking any non-prescription drugs as some may affect the action of this medication.    -- Indication: For PNA (pneumonia) I will START or STAY ON the medications listed below when I get home from the hospital:    hydrocodone-acetaminophen 10 mg-325 mg oral tablet  -- 1 tab(s) by mouth every 6 hours, As Needed  -- Indication: For Pain    benazepril 10 mg oral tablet  -- 1 tab(s) by mouth once a day  -- Indication: For Hypertension    gabapentin 300 mg oral capsule  -- 1 cap(s) by mouth once a day, As Needed  -- Indication: For Pain    triamterene-hydrochlorothiazide 37.5 mg-25 mg oral tablet  -- 1 tab(s) by mouth once a day  -- Indication: For Hypertension    Plaquenil 200 mg oral tablet  -- 1 tab(s) by mouth once a day  -- Indication: For Rheumatoid arthritis    metoprolol succinate 100 mg oral tablet, extended release  -- 1 tab(s) by mouth once a day  -- Indication: For Hypertension    amLODIPine 10 mg oral tablet  -- 1 tab(s) by mouth once a day  -- Indication: For Hypertension    moxifloxacin 400 mg oral tablet  -- 1 tab(s) by mouth once a day MDD:Please stop by 3/14/19  -- Do not take dairy products, antacids, or iron preparations within one hour of this medication.  Finish all this medication unless otherwise directed by prescriber.  May cause drowsiness or dizziness.  Obtain medical advice before taking any non-prescription drugs as some may affect the action of this medication.    -- Indication: For PNA (pneumonia)

## 2019-02-13 NOTE — DISCHARGE NOTE ADULT - CARE PROVIDER_API CALL
Mian Díaz)  Infectious Disease; Internal Medicine  [address] [place] [address]  Phone: [phone]  Fax: [phone]  Follow Up Time:     Saran Mandujano)  Internal Medicine; Pulmonary Disease  [address] [facility], [address] [place] [address]  Phone: [phone]  Fax: [phone]  Follow Up Time: 1 week    Gisele Barrow)  Internal Medicine; Rheumatology  [address] [facility] [facility] [address]  [place] [address]  Phone: [phone]  Fax: [phone]  Follow Up Time: 1 week

## 2019-02-13 NOTE — DISCHARGE NOTE ADULT - MEDICATION SUMMARY - MEDICATIONS TO STOP TAKING
I will STOP taking the medications listed below when I get home from the hospital:  None I will STOP taking the medications listed below when I get home from the hospital:    leflunomide 20 mg oral tablet  -- 1 tab(s) by mouth once a day

## 2019-02-13 NOTE — DISCHARGE NOTE ADULT - CARE PLAN
Principal Discharge DX:	PNA (pneumonia)  Goal:	No further cough  Assessment and plan of treatment:	You came into the hospital with cough, fevers, sweats and chills. This was due to a lung infection. You were treated with IV antibiotics for this. Please finish your antibiotics and followup with your primary care provider in 1-2 weeks.  Secondary Diagnosis:	Cavitary lesion of lung  Secondary Diagnosis:	Rheumatoid arthritis  Secondary Diagnosis:	Hypertension  Secondary Diagnosis:	Thyroid nodule 1 Principal Discharge DX:	PNA (pneumonia)  Goal:	No further cough  Assessment and plan of treatment:	You came into the hospital with cough, fevers, sweats and chills. This was due to a lung infection. You were treated with IV antibiotics for this. Please finish your antibiotics and followup with your primary care provider in 1-2 weeks.  Secondary Diagnosis:	Cavitary lesion of lung  Assessment and plan of treatment:	Your chest CT showed a cavitation in your right upper lung. Due to this problem, you had to be ruled out for active infectious tuberculosis. Your test result was negative. You will need to follow up on the final result in 6 weeks with the infectious disease doctor who saw you in the hospital,   Secondary Diagnosis:	Rheumatoid arthritis  Assessment and plan of treatment:	Your RA medications were stopped. Please see your rheumatologist within 1 week to discuss when it will be safe for you to resume your therapy.  Secondary Diagnosis:	Hypertension  Assessment and plan of treatment:	You have high blood pressure. Please continue to your home blood pressure medications as prescribed.  Secondary Diagnosis:	Thyroid nodule  Assessment and plan of treatment:	You have a thyroid nodule. Please keep your scheduled appointment to have this followed up with an ultrasound. Principal Discharge DX:	PNA (pneumonia)  Goal:	No further cough  Assessment and plan of treatment:	You came into the hospital with cough, fevers, sweats and chills. This was due to a lung infection. You were treated with IV antibiotics for this. Please finish your antibiotics and followup with your primary care provider in 1-2 weeks.  Secondary Diagnosis:	Cavitary lesion of lung  Goal:	Would need to followup with ID and PMD  Assessment and plan of treatment:	Your chest CT showed a cavitation in your right upper lung. Due to this problem, you had to be ruled out for active infectious tuberculosis. Your test result was negative. You will need to follow up on the final result in 6 weeks with the infectious disease doctor who saw you in the hospital, Dr. Kalie López [address] [place] [address] [phone] on 3/4/19  Secondary Diagnosis:	Rheumatoid arthritis  Goal:	Please followup with Dr. Barrow within 1 week of discharge  Assessment and plan of treatment:	Your RA medications were stopped. Please see your rheumatologist within 1 week to discuss when it will be safe for you to resume your therapy. Holding the Leflunomide but continue the Plaquenil  Secondary Diagnosis:	Hypertension  Goal:	Please continue your home BP meds and followup with your PMD  Assessment and plan of treatment:	You have high blood pressure. Please continue to your home blood pressure medications as prescribed.  Secondary Diagnosis:	Thyroid nodule  Goal:	Please followup with your PMD  Assessment and plan of treatment:	You have a thyroid nodule. Please keep your scheduled appointment to have this followed up with an ultrasound.

## 2019-02-13 NOTE — DISCHARGE NOTE ADULT - PROVIDER TOKENS
PROVIDER:[TOKEN:[[id number]]],PROVIDER:[TOKEN:[[id number]],FOLLOWUP:[1 week]],PROVIDER:[TOKEN:[[id number]],FOLLOWUP:[1 week]]

## 2019-02-13 NOTE — DISCHARGE NOTE ADULT - ADDITIONAL INSTRUCTIONS
Please followup with your infectious disease physician,  , in 6 weeks to discuss your tuberculosis culture results.     Please followup with your primary care provider in 1-2 weeks for your general health needs.     Please followup with your rheumatologist within 1 week to discuss when it is safe to restart your RA medications. Please followup with your infectious disease physician, Mian Díaz) [address] [place] [address] Phone: [phone]. , in 6 weeks to discuss your tuberculosis culture results and would start on Moxifloxacin 400mg once a day till 3/4/19.   Please followup with your primary care provider in 1-2 weeks for your general health needs.   Please followup with your rheumatologist within 1 week to discuss when it is safe to restart your RA medications. Holding the leflunomide for now until you see your rheumatologist. Please followup with your infectious disease physician, Mian Díaz) [address] [place] [address] Phone: [phone]. , in 6 weeks to discuss your tuberculosis culture results and would start on Moxifloxacin 400mg once a day till 3/4/19 and repeat CT Chest.   Please followup with your primary care provider in 1-2 weeks for your general health needs.   Please followup with your rheumatologist within 1 week to discuss when it is safe to restart your RA medications. Holding the leflunomide for now until you see your rheumatologist.

## 2019-02-13 NOTE — PROGRESS NOTE ADULT - SUBJECTIVE AND OBJECTIVE BOX
CC: F/U for ? PNA    Saw/spoke to patient. Patient s/p bronchoscopy. Overall well. No complaints, no cough, minimal chest pain.    Allergies  adhesives (Blisters)  latex (Blisters)  Persantine (Blisters)    ANTIMICROBIALS:  piperacillin/tazobactam IVPB. 3.375 every 8 hours    PE:    Vital Signs Last 24 Hrs  T(C): 36.6 (13 Feb 2019 05:40), Max: 37.2 (12 Feb 2019 13:51)  T(F): 97.8 (13 Feb 2019 05:40), Max: 98.9 (12 Feb 2019 13:51)  HR: 75 (13 Feb 2019 05:40) (65 - 82)  BP: 136/86 (13 Feb 2019 05:40) (91/57 - 157/98)  BP(mean): 80 (12 Feb 2019 17:15) (65 - 86)  RR: 18 (13 Feb 2019 05:40) (14 - 21)  SpO2: 97% (13 Feb 2019 05:40) (94% - 100%)    Gen: AOx3, NAD, non-toxic, pleasant  CV: S1+S2 normal, nontachycardic  Resp: Clear bilat, no resp distress, no crackles/wheezes  Abd: Soft, nontender, +BS  Ext: No LE edema, no wounds    LABS:                        11.0   5.85  )-----------( 425      ( 12 Feb 2019 06:00 )             35.7     02-12    143  |  103  |  13  ----------------------------<  98  3.8   |  27  |  0.88    Ca    9.8      12 Feb 2019 06:00    MICROBIOLOGY:    BRONCHIAL LAVAGE  02-12-19 NGTD    BLOOD PERIPHERAL  02-10-19 NGTD    BLOOD PERIPHERAL  02-08-19 NGTD    RADIOLOGY:    2/9 CT:    FINDINGS:    CHEST:     LUNGS AND LARGE AIRWAYS: The airways are patent. There is a 2.9 x 2.9 cm   cystic, cavitary lesion in the right upper lobe, correlating with the   radiographic finding (2:32).  PLEURA: No pleural effusion.  VESSELS: Adequate opacification of the pulmonary arterial tree. There is   no pulmonary embolism.  HEART: The heart is mildly enlarged. No pericardial effusion.  MEDIASTINUM AND RUBEN: No adenopathy.  CHEST WALL AND LOWER NECK: Within normal limits.  VISUALIZED UPPER ABDOMEN: Small hiatal hernia.  BONES: Degenerative changes of the spine.    IMPRESSION:   No pulmonary embolism.    Cavitary right upper lobe lesion, which is likely   infectious/inflammatory, less likely malignancy. Tuberculosis is a   consideration. Three-month follow-up CT recommended for complete   evaluation.

## 2019-02-13 NOTE — PROGRESS NOTE ADULT - SUBJECTIVE AND OBJECTIVE BOX
Dr. Isaias Bell   Internal Medicine PGY-1  Pager #[phone]    Patient is a 68y old  Female who presents with a chief complaint of Cough, fevers, sweats, and chills a/w RUL cavitary mass (13 Feb 2019 10:28)      SUBJECTIVE / OVERNIGHT EVENTS:  AGAPITO ON. Tolerated bronch well.     MEDICATIONS  (STANDING):  amLODIPine   Tablet 10 milliGRAM(s) Oral daily  heparin  Injectable 5000 Unit(s) SubCutaneous every 8 hours  influenza   Vaccine 0.5 milliLiter(s) IntraMuscular once  lidocaine   Patch 1 Patch Transdermal every 24 hours  lisinopril 10 milliGRAM(s) Oral daily  melatonin 3 milliGRAM(s) Oral at bedtime  metoprolol tartrate 50 milliGRAM(s) Oral two times a day  piperacillin/tazobactam IVPB. 3.375 Gram(s) IV Intermittent every 8 hours  sodium chloride 3%  Inhalation 4 milliLiter(s) Inhalation every 8 hours  triamterene 37.5 mG/hydrochlorothiazide 25 mG Tablet 1 Tablet(s) Oral daily    MEDICATIONS  (PRN):  guaiFENesin    Syrup 100 milliGRAM(s) Oral every 6 hours PRN Cough      Vital Signs Last 24 Hrs  T(C): 36.6 (13 Feb 2019 05:40), Max: 37.2 (12 Feb 2019 13:51)  T(F): 97.8 (13 Feb 2019 05:40), Max: 98.9 (12 Feb 2019 13:51)  HR: 75 (13 Feb 2019 05:40) (65 - 82)  BP: 136/86 (13 Feb 2019 05:40) (91/57 - 157/98)  BP(mean): 80 (12 Feb 2019 17:15) (65 - 86)  RR: 18 (13 Feb 2019 05:40) (14 - 21)  SpO2: 97% (13 Feb 2019 05:40) (94% - 100%)  CAPILLARY BLOOD GLUCOSE        I&O's Summary    12 Feb 2019 07:01  -  13 Feb 2019 07:00  --------------------------------------------------------  IN: 340 mL / OUT: 0 mL / NET: 340 mL        PHYSICAL EXAM:  GENERAL: NAD, well-developed  HEAD:  Atraumatic, Normocephalic  EYES: EOMI, PERRLA, conjunctiva and sclera clear  NECK: Supple, No JVD  CHEST/LUNG: Clear to auscultation bilaterally; No wheeze  HEART: Regular rate and rhythm; No murmurs, rubs, or gallops  ABDOMEN: Soft, Nontender, Nondistended; Bowel sounds present  EXTREMITIES:  2+ Peripheral Pulses, No clubbing, cyanosis, or edema  PSYCH: AAOx3  NEUROLOGY: non-focal  SKIN: No rashes or lesions    LABS:                        11.0   5.85  )-----------( 425      ( 12 Feb 2019 06:00 )             35.7     02-12    143  |  103  |  13  ----------------------------<  98  3.8   |  27  |  0.88    Ca    9.8      12 Feb 2019 06:00                RADIOLOGY & ADDITIONAL TESTS:    Gram Stain Sputum:   NOS^No Organisms Seen  WBC^White Blood Cells  QNTY CELLS IN GRAM STAIN: MANY (4+) (02.12.19 @ 17:07)    Culture - Fungal, Blood:   CULTURE NEGATIVE FOR YEASTS AND MOLDS-PRELIMINARY RESULT  AFTER 72 HOURS INCUBATION (02.10.19 @ 07:50) Dr. Isaias Bell   Internal Medicine PGY-1  Pager #[phone]    Patient is a 68y old  Female who presents with a chief complaint of Cough, fevers, sweats, and chills a/w RUL cavitary mass (13 Feb 2019 10:28)      SUBJECTIVE / OVERNIGHT EVENTS:  AGAPITO ON. Tolerated bronch well. No cough or SOB. No CP. No N/V or diarrhea. No abdominal pain.     MEDICATIONS  (STANDING):  amLODIPine   Tablet 10 milliGRAM(s) Oral daily  heparin  Injectable 5000 Unit(s) SubCutaneous every 8 hours  influenza   Vaccine 0.5 milliLiter(s) IntraMuscular once  lidocaine   Patch 1 Patch Transdermal every 24 hours  lisinopril 10 milliGRAM(s) Oral daily  melatonin 3 milliGRAM(s) Oral at bedtime  metoprolol tartrate 50 milliGRAM(s) Oral two times a day  piperacillin/tazobactam IVPB. 3.375 Gram(s) IV Intermittent every 8 hours  sodium chloride 3%  Inhalation 4 milliLiter(s) Inhalation every 8 hours  triamterene 37.5 mG/hydrochlorothiazide 25 mG Tablet 1 Tablet(s) Oral daily    MEDICATIONS  (PRN):  guaiFENesin    Syrup 100 milliGRAM(s) Oral every 6 hours PRN Cough      Vital Signs Last 24 Hrs  T(C): 36.6 (13 Feb 2019 05:40), Max: 37.2 (12 Feb 2019 13:51)  T(F): 97.8 (13 Feb 2019 05:40), Max: 98.9 (12 Feb 2019 13:51)  HR: 75 (13 Feb 2019 05:40) (65 - 82)  BP: 136/86 (13 Feb 2019 05:40) (91/57 - 157/98)  BP(mean): 80 (12 Feb 2019 17:15) (65 - 86)  RR: 18 (13 Feb 2019 05:40) (14 - 21)  SpO2: 97% (13 Feb 2019 05:40) (94% - 100%)  CAPILLARY BLOOD GLUCOSE        I&O's Summary    12 Feb 2019 07:01  -  13 Feb 2019 07:00  --------------------------------------------------------  IN: 340 mL / OUT: 0 mL / NET: 340 mL        PHYSICAL EXAM:  GENERAL: NAD, well-developed  HEAD:  Atraumatic, Normocephalic  EYES: EOMI, PERRLA, conjunctiva and sclera clear  NECK: Supple, No JVD  CHEST/LUNG: Clear to auscultation bilaterally; No wheeze  HEART: Regular rate and rhythm; No murmurs, rubs, or gallops  ABDOMEN: Soft, Nontender, Nondistended; Bowel sounds present  EXTREMITIES:  2+ Peripheral Pulses, No clubbing, cyanosis, or edema  PSYCH: AAOx3  NEUROLOGY: non-focal  SKIN: No rashes or lesions    LABS:                        11.0   5.85  )-----------( 425      ( 12 Feb 2019 06:00 )             35.7     02-12    143  |  103  |  13  ----------------------------<  98  3.8   |  27  |  0.88    Ca    9.8      12 Feb 2019 06:00                RADIOLOGY & ADDITIONAL TESTS:    Gram Stain Sputum:   NOS^No Organisms Seen  WBC^White Blood Cells  QNTY CELLS IN GRAM STAIN: MANY (4+) (02.12.19 @ 17:07)    Culture - Fungal, Blood:   CULTURE NEGATIVE FOR YEASTS AND MOLDS-PRELIMINARY RESULT  AFTER 72 HOURS INCUBATION (02.10.19 @ 07:50) Dr. Isaias Bell   Internal Medicine PGY-1  Pager #[phone]    Patient is a 68y old  Female who presents with a chief complaint of Cough, fevers, sweats, and chills a/w RUL cavitary mass (13 Feb 2019 10:28)      SUBJECTIVE / OVERNIGHT EVENTS:  AGAPITO ON. Tolerated bronch well. No cough or SOB. No CP. No N/V or diarrhea. No abdominal pain.     MEDICATIONS  (STANDING):  amLODIPine   Tablet 10 milliGRAM(s) Oral daily  heparin  Injectable 5000 Unit(s) SubCutaneous every 8 hours  influenza   Vaccine 0.5 milliLiter(s) IntraMuscular once  lidocaine   Patch 1 Patch Transdermal every 24 hours  lisinopril 10 milliGRAM(s) Oral daily  melatonin 3 milliGRAM(s) Oral at bedtime  metoprolol tartrate 50 milliGRAM(s) Oral two times a day  piperacillin/tazobactam IVPB. 3.375 Gram(s) IV Intermittent every 8 hours  sodium chloride 3%  Inhalation 4 milliLiter(s) Inhalation every 8 hours  triamterene 37.5 mG/hydrochlorothiazide 25 mG Tablet 1 Tablet(s) Oral daily    MEDICATIONS  (PRN):  guaiFENesin    Syrup 100 milliGRAM(s) Oral every 6 hours PRN Cough      Vital Signs Last 24 Hrs  T(C): 36.6 (13 Feb 2019 05:40), Max: 37.2 (12 Feb 2019 13:51)  T(F): 97.8 (13 Feb 2019 05:40), Max: 98.9 (12 Feb 2019 13:51)  HR: 75 (13 Feb 2019 05:40) (65 - 82)  BP: 136/86 (13 Feb 2019 05:40) (91/57 - 157/98)  BP(mean): 80 (12 Feb 2019 17:15) (65 - 86)  RR: 18 (13 Feb 2019 05:40) (14 - 21)  SpO2: 97% (13 Feb 2019 05:40) (94% - 100%)  CAPILLARY BLOOD GLUCOSE        I&O's Summary    12 Feb 2019 07:01  -  13 Feb 2019 07:00  --------------------------------------------------------  IN: 340 mL / OUT: 0 mL / NET: 340 mL        PHYSICAL EXAM:  GENERAL: NAD, well-developed  HEAD:  Atraumatic, Normocephalic  EYES: EOMI, PERRLA, conjunctiva and sclera clear  NECK: Supple, No JVD  CHEST/LUNG: Clear to auscultation bilaterally; No wheeze  HEART: Regular rate and rhythm; No murmurs, rubs, or gallops  ABDOMEN: Soft, Nontender, Nondistended; Bowel sounds present  EXTREMITIES:  2+ Peripheral Pulses, No clubbing, cyanosis, or edema  PSYCH: AAOx3  NEUROLOGY: non-focal  SKIN: No rashes or lesions    LABS:                        11.0   5.85  )-----------( 425      ( 12 Feb 2019 06:00 )             35.7     02-12    143  |  103  |  13  ----------------------------<  98  3.8   |  27  |  0.88    Ca    9.8      12 Feb 2019 06:00                RADIOLOGY & ADDITIONAL TESTS:    Gram Stain Sputum:   NOS^No Organisms Seen  WBC^White Blood Cells  QNTY CELLS IN GRAM STAIN: MANY (4+) (02.12.19 @ 17:07)    Culture - Fungal, Blood:   CULTURE NEGATIVE FOR YEASTS AND MOLDS-PRELIMINARY RESULT  AFTER 72 HOURS INCUBATION (02.10.19 @ 07:50)    Culture - Acid Fast Smear Concentrated:   AFB SMEAR= NO ACID FAST BACILLI SEEN (02.12.19 @ 17:07)

## 2019-02-14 VITALS
DIASTOLIC BLOOD PRESSURE: 88 MMHG | RESPIRATION RATE: 18 BRPM | HEART RATE: 68 BPM | TEMPERATURE: 98 F | SYSTOLIC BLOOD PRESSURE: 126 MMHG | OXYGEN SATURATION: 96 %

## 2019-02-14 LAB
H CAPSUL AG SPEC-ACNC: SIGNIFICANT CHANGE UP
H CAPSUL AG UR QL IA: SIGNIFICANT CHANGE UP
SPECIMEN SOURCE: SIGNIFICANT CHANGE UP

## 2019-02-14 PROCEDURE — 99232 SBSQ HOSP IP/OBS MODERATE 35: CPT

## 2019-02-14 PROCEDURE — 99239 HOSP IP/OBS DSCHRG MGMT >30: CPT

## 2019-02-14 RX ORDER — LEFLUNOMIDE 10 MG/1
1 TABLET ORAL
Qty: 0 | Refills: 0 | COMMUNITY

## 2019-02-14 RX ORDER — ABATACEPT 125 MG/ML
125 INJECTION, SOLUTION SUBCUTANEOUS
Qty: 3 | Refills: 3 | Status: DISCONTINUED | COMMUNITY
Start: 2018-06-06 | End: 2019-02-14

## 2019-02-14 RX ORDER — LEFLUNOMIDE 20 MG/1
20 TABLET, FILM COATED ORAL
Refills: 0 | Status: DISCONTINUED | COMMUNITY
End: 2019-02-14

## 2019-02-14 RX ORDER — MOXIFLOXACIN HYDROCHLORIDE TABLETS, 400 MG 400 MG/1
1 TABLET, FILM COATED ORAL
Qty: 30 | Refills: 0
Start: 2019-02-14 | End: 2019-03-15

## 2019-02-14 RX ADMIN — LIDOCAINE 1 PATCH: 4 CREAM TOPICAL at 10:00

## 2019-02-14 RX ADMIN — Medication 1 TABLET(S): at 05:20

## 2019-02-14 RX ADMIN — AMLODIPINE BESYLATE 10 MILLIGRAM(S): 2.5 TABLET ORAL at 05:20

## 2019-02-14 RX ADMIN — PIPERACILLIN AND TAZOBACTAM 25 GRAM(S): 4; .5 INJECTION, POWDER, LYOPHILIZED, FOR SOLUTION INTRAVENOUS at 00:08

## 2019-02-14 RX ADMIN — Medication 50 MILLIGRAM(S): at 05:20

## 2019-02-14 RX ADMIN — LIDOCAINE 1 PATCH: 4 CREAM TOPICAL at 08:53

## 2019-02-14 RX ADMIN — LISINOPRIL 10 MILLIGRAM(S): 2.5 TABLET ORAL at 05:20

## 2019-02-14 NOTE — PROGRESS NOTE ADULT - REASON FOR ADMISSION
Cough, fevers, sweats, and chills a/w RUL cavitary mass

## 2019-02-14 NOTE — PROGRESS NOTE ADULT - SUBJECTIVE AND OBJECTIVE BOX
68y old  Female who presents with a chief complaint of Cough, fevers, sweats, and chills a/w RUL cavitary mass (14 Feb 2019 06:19)      Interval history:  Afebrile, dry cough, minimal, no SOB, no N/V/D, no abdominal pain, denies dysuria. Ready to go home.       Allergies:   adhesives (Blisters)  latex (Blisters)  Persantine (Blisters)      Antimicrobials:  piperacillin/tazobactam IVPB. 3.375 Gram(s) IV Intermittent every 8 hours      REVIEW OF SYSTEMS:  No chest pain   No rash.       Vital Signs Last 24 Hrs  T(C): 36.8 (02-14-19 @ 05:17), Max: 36.9 (02-13-19 @ 15:18)  T(F): 98.2 (02-14-19 @ 05:17), Max: 98.5 (02-13-19 @ 15:18)  HR: 59 (02-14-19 @ 05:17) (59 - 73)  BP: 127/85 (02-14-19 @ 05:17) (127/73 - 129/75)  RR: 18 (02-14-19 @ 05:17) (18 - 18)  SpO2: 96% (02-14-19 @ 05:17) (96% - 97%)      PHYSICAL EXAM:  Patient in no acute distress. AAOX3.  No icterus, no oral ulcers.  Cardiovascular: S1S2 normal.  Lungs: Good air entry B/L lung fields.  Gastrointestinal: soft, nontender, nondistended.  Extremities: no edema.  IV sites not inflamed.       No new labs performed today.       Culture - Yeast and Fungus (collected 12 Feb 2019 17:07)  Source: BRONCHIAL LAVAGE  Preliminary Report (14 Feb 2019 09:10):    CULTURE NEGATIVE FOR YEASTS AND MOLDS AFTER 1 DAY    Culture - Acid Fast Smear Concentrated (collected 12 Feb 2019 17:07)  Source: BRONCHIAL LAVAGE  Final Report (13 Feb 2019 15:01):    AFB SMEAR= NO ACID FAST BACILLI SEEN    Culture - Respiratory with Gram Stain (collected 12 Feb 2019 17:07)  Source: BRONCHIAL LAVAGE  Preliminary Report (14 Feb 2019 08:02):    NRF^Normal Respiratory Laura    QUANTITY OF GROWTH: MODERATE

## 2019-02-14 NOTE — PROGRESS NOTE ADULT - PROVIDER SPECIALTY LIST ADULT
Anesthesia
Infectious Disease
Internal Medicine
Pulmonology
Pulmonology
Infectious Disease

## 2019-02-14 NOTE — PROGRESS NOTE ADULT - ASSESSMENT
69 yo F w/t a PMHx of RA on (Golimumab, plaquenil, leflunomide), HTN, provoked DVT, R sided thyroid nodules who p/w a 9 day course of pleuritic CP on inspiration, cough, and night sweats with fever and rigors 2/2 infectious vs malignancy vs RA nodules vs PE.   CT chest showing cavitary right upper lobe lesion. PE ruled out outpatient with CTA.   She has no risk factors for TB as she was born and raised in [place] and only lived in [facility] for a few years. No known exposures to TB, no family members with TB, never been incarcerated or worked for the alf system. She is a retired xray technician.  Ddx include TB vs Invasive fungal infections, including histoplasmosis, coccidioidomycosis, candidiasis, aspergillosis, and pneumocystosis which have been described with the use of golimumab. Furthermore, leflunomide can also increase risk of infections such as TB, aspergillosis and PJP.   Overall Cavitary pneumonia in an immunosuppressed pt, s/p bronch.  All cultures negative to date.   AFB smear negative. Pt clinically improved.        Recommendations:   1.	Continue Piperacillin/tazobactam 3.375 grams every 8 hours while inpt.   2.	Follow up bronch cx, NTD.   3.	Fungitell pending, Galactomannan antigen and Quantiferon negative. Histoplasma urine Ag, cocci ab in lab.   4.	ESR, CRP elevated, LDH normal.   5.	Rheum and pulmonary on board.   6.	Can switch to moxifloxacin 400 mg daily on discharge until 3//19. Pt to follow with me as outpt for repeat CT to ensure improvement.

## 2019-02-14 NOTE — PROGRESS NOTE ADULT - SUBJECTIVE AND OBJECTIVE BOX
Jake Castillo  PGY2 Internal Medicine  Pager [phone] or [phone]    Patient is a 68y old  Female who presents with a chief complaint of Cough, fevers, sweats, and chills a/w RUL cavitary mass (14 Feb 2019 13:39)    SUBJECTIVE / OVERNIGHT EVENTS:  No overnight events, no f/n/v/CP/SOB    MEDICATIONS  (STANDING):  amLODIPine   Tablet 10 milliGRAM(s) Oral daily  heparin  Injectable 5000 Unit(s) SubCutaneous every 8 hours  influenza   Vaccine 0.5 milliLiter(s) IntraMuscular once  lidocaine   Patch 1 Patch Transdermal every 24 hours  lisinopril 10 milliGRAM(s) Oral daily  melatonin 3 milliGRAM(s) Oral at bedtime  metoprolol tartrate 50 milliGRAM(s) Oral two times a day  piperacillin/tazobactam IVPB. 3.375 Gram(s) IV Intermittent every 8 hours  sodium chloride 3%  Inhalation 4 milliLiter(s) Inhalation every 8 hours  triamterene 37.5 mG/hydrochlorothiazide 25 mG Tablet 1 Tablet(s) Oral daily    MEDICATIONS  (PRN):  guaiFENesin    Syrup 100 milliGRAM(s) Oral every 6 hours PRN Cough    T(C): 36.4 (02-14-19 @ 15:16), Max: 36.8 (02-14-19 @ 05:17)  HR: 68 (02-14-19 @ 15:16) (59 - 68)  BP: 126/88 (02-14-19 @ 15:16) (126/88 - 129/75)  RR: 18 (02-14-19 @ 15:16) (18 - 18)  SpO2: 96% (02-14-19 @ 15:16) (96% - 97%)  CAPILLARY BLOOD GLUCOSE    I&O's Summary  LABS: None today    Micro:    RADIOLOGY & ADDITIONAL TESTS:    Imaging Personally Reviewed:    Consultant(s) Notes Reviewed:      Care Discussed with Consultants/Other Providers: Jake Castillo  PGY2 Internal Medicine  Pager [phone] or [phone]    Patient is a 68y old  Female who presents with a chief complaint of Cough, fevers, sweats, and chills a/w RUL cavitary mass (14 Feb 2019 13:39)    SUBJECTIVE / OVERNIGHT EVENTS:  No overnight events, no f/n/v/CP/SOB    MEDICATIONS  (STANDING):  amLODIPine   Tablet 10 milliGRAM(s) Oral daily  heparin  Injectable 5000 Unit(s) SubCutaneous every 8 hours  influenza   Vaccine 0.5 milliLiter(s) IntraMuscular once  lidocaine   Patch 1 Patch Transdermal every 24 hours  lisinopril 10 milliGRAM(s) Oral daily  melatonin 3 milliGRAM(s) Oral at bedtime  metoprolol tartrate 50 milliGRAM(s) Oral two times a day  piperacillin/tazobactam IVPB. 3.375 Gram(s) IV Intermittent every 8 hours  sodium chloride 3%  Inhalation 4 milliLiter(s) Inhalation every 8 hours  triamterene 37.5 mG/hydrochlorothiazide 25 mG Tablet 1 Tablet(s) Oral daily    MEDICATIONS  (PRN):  guaiFENesin    Syrup 100 milliGRAM(s) Oral every 6 hours PRN Cough    T(C): 36.4 (02-14-19 @ 15:16), Max: 36.8 (02-14-19 @ 05:17)  HR: 68 (02-14-19 @ 15:16) (59 - 68)  BP: 126/88 (02-14-19 @ 15:16) (126/88 - 129/75)  RR: 18 (02-14-19 @ 15:16) (18 - 18)  SpO2: 96% (02-14-19 @ 15:16) (96% - 97%)  CAPILLARY BLOOD GLUCOSE    T(C): 36.4 (02-14-19 @ 15:16), Max: 36.8 (02-14-19 @ 05:17)  HR: 68 (02-14-19 @ 15:16) (59 - 68)  BP: 126/88 (02-14-19 @ 15:16) (126/88 - 127/85)  RR: 18 (02-14-19 @ 15:16) (18 - 18)  SpO2: 96% (02-14-19 @ 15:16) (96% - 97%)    PHYSICAL EXAM:  GENERAL: NAD, well-developed  CHEST/LUNG: Clear to auscultation bilaterally; No wheeze  HEART: Regular rate and rhythm; No murmurs, rubs, or gallops  ABDOMEN: Soft, Nontender, Nondistended; Bowel sounds present  EXTREMITIES:  2+ Peripheral Pulses, No clubbing, cyanosis, or edema  PSYCH: AAOx3  NEUROLOGY: CN II-XII grossly intact, moving all extremities      I&O's Summary  LABS: None today    Micro:    RADIOLOGY & ADDITIONAL TESTS:    Imaging Personally Reviewed:    Consultant(s) Notes Reviewed:      Care Discussed with Consultants/Other Providers:

## 2019-02-14 NOTE — PROGRESS NOTE ADULT - PROBLEM SELECTOR PLAN 2
Holding meds in setting of possible acute infection  -Rheum Recs Holding meds in setting of possible acute infection  -Rheum Recs  -hold lefulonmide at d/c and can c/w plaquenil

## 2019-02-14 NOTE — PROGRESS NOTE ADULT - PROBLEM SELECTOR PLAN 1
- Acute cough, fevers, and night sweats and chills possibly 2/2 infectious causes (TB, staph, opportunistic infections) vs. rheumatoid nodule vs. malignancy. Could still be viral URI w/t coincidental lung mass. CT confirms clear cavitary lesion.   -fungitell pending  -Initial sputum gram stain neg, AFB smear without acid fast organisms  -Repeat Quant gold neg  -BCx negative x2, cryptococcal ag neg, hiv neg  -awaiting public health clearance for dispo  -ID recs  -Pulm recs  -Continue zosyn  -Trend fever  -Isolation precaution till clearance and dispo - Acute cough, fevers, and night sweats and chills possibly 2/2 infectious causes (TB, staph, opportunistic infections) vs. rheumatoid nodule vs. malignancy. Could still be viral URI w/t coincidental lung mass. CT confirms clear cavitary lesion.   -fungitell pending  -Initial sputum gram stain neg, AFB smear without acid fast organisms  -Repeat Quant gold neg  -BCx negative x2, cryptococcal ag neg, hiv neg  -no need for public health clearance for dispo since sputum AFB negative   -ID recs, moxifloxacin 400mg qd till 3/4/19 and ID f/u outpt and repeat CT Chest  -Pulm recs  -Continue zosyn  -Trend fever  -Isolation precaution till clearance and dispo

## 2019-02-14 NOTE — CHART NOTE - NSCHARTNOTEFT_GEN_A_CORE
Spoke to Infection control, since patient's sputum smear and prior TB studies thus negative, she is cleared on their standpoint and would only need public health department clearance if sputum smear was +AFB. On their standpoint can be dispo but would need isolation while still in the hospital. Spoke to Infection control, since patient's sputum smear and prior TB studies thus negative, she is cleared on their standpoint and would only need public health department clearance if sputum smear was +AFB. On their standpoint can be dispo but would need isolation while still in the hospital. Rheum before dispo would want to still hold the leflunomide and continue the rest of home meds and f/u dispo outpt rheum.

## 2019-02-14 NOTE — PROGRESS NOTE ADULT - PROBLEM SELECTOR PLAN 5
DVT PPx: IMPROVE score 4, heparin Q8H  Diet: DASH

## 2019-02-14 NOTE — PROGRESS NOTE ADULT - ATTENDING COMMENTS
Mian Riley  Pager: [phone]. If no response or past 5 pm call [phone].
68F with rheumatoid arthritis on immunosuppression here with subjective fever/chills and cough for 9 days.   Has cavitary RUL nodule, which had broad differential given her disease and immunosuppression.     Will follow up BAL results   Continue Zosyn for now
Mian Riley  Pager: [phone]. If no response or past 5 pm call [phone].
Mian Riley  Pager: [phone]. If no response or past 5 pm call [phone].     My colleague will cover 2/13
Plan for bronchoscopy with BAL tomorrow to send specimens for culture, including TB.
Mian Riley  Pager: [phone]. If no response or past 5 pm call [phone].
68 W h/o RA on biologic, sent to ED by PMD after outpatient CT showed cavitary mass. Concern for pulmonary tuberculosis given biologic use. Patient unable to provide adequate sputum for AFB analysis. Pulmonology following. Underwent bronchoscopy today for specimen collection. Continuing to hold DMARD and biologics. Continuing pip-tazo per ID recommendations for bacterial PNA coverage.
Patient seen and examined.  Case discussed with house staff.  Agree with above as edited.  68 y.o. Female with h/o RA, HTN, prior provoked DVT, known right thyroid nodules sent to ED by PMD for Outpatient CT with RUL mass. CXR here shows RUL mass with potential cavitation. CT chest showed cavitary right upper lobe lesion, which is likely infectious/inflammatory, less likely malignancy. Tuberculosis is a  consideration.     Plan: r/o TB. c/w airborne isolation and empiric abx with Zosyn.  check AFB x 3. F/U ID and Pulmonary recs.
Patient seen and examined.  Case discussed with house staff.  Agree with above as edited.  68 y.o. Female with h/o RA, HTN, prior provoked DVT, known right thyroid nodules sent to ED by PMD for Outpatient CT with RUL mass. CXR here shows RUL mass with potential cavitation. CT chest showed cavitary right upper lobe lesion, which is likely infectious/inflammatory, less likely malignancy. Tuberculosis is a  consideration.     Plan: r/o TB. c/w airborne isolation and empiric abx with Zosyn.  check AFB x 3. F/U ID and Pulmonary recs.
68 W h/o RA on biologic, sent to ED by PMD after outpatient CT showed cavitary mass. Concern for pulmonary tuberculosis given biologic use. Patient unable to provide adequate sputum for AFB analysis. Pulmonology following. Case discussed with fellow, Dr. Newsome. Patient to undergo bronchoscopy tomorrow for specimen collection and tuberculosis assessment. Continuing to hold DMARD and biologics. Continuing pip-tazo per ID recommendations for bacterial PNA coverage.
68 W h/o RA on biologic, sent to ED by PMD after outpatient CT showed cavitary mass. Concern for pulmonary tuberculosis given biologic use. Underwent bronchoscopy for lavage which was submitted as AFB smear and culture; smear without acid fast organisms. Continuing to hold DMARD and biologics given concern for infection. ID recommends outpatient course of moxifloxacin.     Patient stable for discharge per Infection Control.    I spent 35 minutes counseling this patient and coordinating their discharge.
68 W h/o RA on biologic, sent to ED by PMD after outpatient CT showed cavitary mass. Concern for pulmonary tuberculosis given biologic use. Underwent bronchoscopy for lavage which was submitted as AFB smear and culture; smear without acid fast organisms. Continuing to hold DMARD and biologics. Continuing pip-tazo per ID recommendations for bacterial PNA coverage.    Case discussed with Dr. Mao of ID. Despite AFB smear without organisms, will need clearance for discharge from Infection Control and Department of Health.

## 2019-02-14 NOTE — PROGRESS NOTE ADULT - PROBLEM SELECTOR PROBLEM 2
Rheumatoid arthritis

## 2019-02-14 NOTE — PROGRESS NOTE ADULT - PROBLEM SELECTOR PROBLEM 1
Cavitary lesion of lung

## 2019-02-15 ENCOUNTER — INBOUND DOCUMENT (OUTPATIENT)
Age: 69
End: 2019-02-15

## 2019-02-15 LAB
BACTERIA SPT RESP CULT: SIGNIFICANT CHANGE UP
COCCIDIOIDES AB SER CF-ACNC: NEGATIVE — SIGNIFICANT CHANGE UP
COCCIDIOIDES IGG SPEC QL IA: NEGATIVE — SIGNIFICANT CHANGE UP

## 2019-02-18 ENCOUNTER — APPOINTMENT (OUTPATIENT)
Dept: INTERNAL MEDICINE | Facility: CLINIC | Age: 69
End: 2019-02-18
Payer: MEDICARE

## 2019-02-18 VITALS
WEIGHT: 208 LBS | HEIGHT: 64 IN | BODY MASS INDEX: 35.51 KG/M2 | DIASTOLIC BLOOD PRESSURE: 70 MMHG | SYSTOLIC BLOOD PRESSURE: 149 MMHG | HEART RATE: 80 BPM

## 2019-02-18 LAB — MISCELLANEOUS - CHEM: SIGNIFICANT CHANGE UP

## 2019-02-18 PROCEDURE — 99214 OFFICE O/P EST MOD 30 MIN: CPT

## 2019-02-18 NOTE — COUNSELING
[de-identified] : hosp records reviewed   as well as cxr and ct chest  explained that having been on antibx cultures may be neg but the afb takes longer to get back  explained that if we don’t see  clearing of the round lesion she might need some type of biopsy

## 2019-02-18 NOTE — PHYSICAL EXAM
[No Acute Distress] : no acute distress [Well Nourished] : well nourished [Well Developed] : well developed [Well-Appearing] : well-appearing [Normal Sclera/Conjunctiva] : normal sclera/conjunctiva [PERRL] : pupils equal round and reactive to light [EOMI] : extraocular movements intact [Normal Outer Ear/Nose] : the outer ears and nose were normal in appearance [Normal Oropharynx] : the oropharynx was normal [Normal TMs] : both tympanic membranes were normal [No JVD] : no jugular venous distention [Supple] : supple [No Lymphadenopathy] : no lymphadenopathy [Thyroid Normal, No Nodules] : the thyroid was normal and there were no nodules present [No Respiratory Distress] : no respiratory distress  [Clear to Auscultation] : lungs were clear to auscultation bilaterally [No Accessory Muscle Use] : no accessory muscle use [Normal Rate] : normal rate  [Regular Rhythm] : with a regular rhythm [Normal S1, S2] : normal S1 and S2

## 2019-02-18 NOTE — HISTORY OF PRESENT ILLNESS
[FreeTextEntry1] : was in hospital   after she became sob and had fevers  despite oral antibx  in hospital was given iv antibiotics and had fob   serologic testing for fungal diseases and quanteferon test  all of the above came back negative  she now feels weak  but generally better she is on a quinilone as per the id consult for  full 30 day course

## 2019-02-18 NOTE — REVIEW OF SYSTEMS
[Shortness Of Breath] : shortness of breath [Negative] : Cardiovascular [FreeTextEntry2] : as per above

## 2019-02-19 LAB — SPECIMEN SOURCE: SIGNIFICANT CHANGE UP

## 2019-02-21 LAB — C IMMITIS IGM SPEC QL IA: NEGATIVE — SIGNIFICANT CHANGE UP

## 2019-03-06 ENCOUNTER — APPOINTMENT (OUTPATIENT)
Dept: INFECTIOUS DISEASE | Facility: CLINIC | Age: 69
End: 2019-03-06
Payer: MEDICARE

## 2019-03-06 VITALS
DIASTOLIC BLOOD PRESSURE: 86 MMHG | HEIGHT: 64 IN | BODY MASS INDEX: 36.02 KG/M2 | SYSTOLIC BLOOD PRESSURE: 151 MMHG | TEMPERATURE: 97.3 F | WEIGHT: 211 LBS | HEART RATE: 62 BPM | OXYGEN SATURATION: 97 %

## 2019-03-06 PROCEDURE — 99214 OFFICE O/P EST MOD 30 MIN: CPT

## 2019-03-06 NOTE — PHYSICAL EXAM
[General Appearance - Alert] : alert [General Appearance - In No Acute Distress] : in no acute distress [Sclera] : the sclera and conjunctiva were normal [Oropharynx] : the oropharynx was normal with no thrush [Auscultation Breath Sounds / Voice Sounds] : lungs were clear to auscultation bilaterally [Heart Rate And Rhythm] : heart rate was normal and rhythm regular [Heart Sounds] : normal S1 and S2 [Edema] : there was no peripheral edema [Abdomen Soft] : soft [Abdomen Tenderness] : non-tender [] : no rash [Oriented To Time, Place, And Person] : oriented to person, place, and time

## 2019-03-06 NOTE — HISTORY OF PRESENT ILLNESS
[FreeTextEntry1] : 68 y o female with RA, on biologics was recently admitted for cavitary pneumonia.\par She is here for follow up. \par She feels well, had an episode of coughing last night but otherwise feels well. \par Less fatigue. \par She has allergies, so is congested which is her baseline. \par Denies any other complains.

## 2019-03-06 NOTE — ASSESSMENT
[FreeTextEntry1] : 68 y o female with RA, on biologics was recently admitted for cavitary pneumonia.\par She is here for follow up. \par Feels well.\par Finishing abx on 3/14/19. \par Will get a repeat CT to ensure improvement and resolution of cavity. \par Needs auth, will arrange through our office. \par CBC/ESR/CRP\par AFB cx from bronch NTD. \par All questions answered. \par Pt to follow up as needed.

## 2019-03-06 NOTE — REVIEW OF SYSTEMS
[Fever] : no fever [Chills] : no chills [Feeling Sick] : not feeling sick [Normal Appetite] : normal appetite [Sore Throat] : no sore throat [Hoarseness] : no hoarseness [Chest Pain] : no chest pain [Lower Ext Edema] : no lower extremity edema [As Noted in HPI] : as noted in HPI [Shortness Of Breath] : no shortness of breath [Sputum] : not coughing up ~M sputum [Pleuritic Chest Pain] : no pleuritic chest pain [Abdominal Pain] : no abdominal pain [Diarrhea] : no diarrhea [Joint Swelling] : no joint swelling [Skin Lesions] : no skin lesions [Anxiety] : no anxiety

## 2019-03-08 LAB
BASOPHILS # BLD AUTO: 0.04 K/UL
BASOPHILS NFR BLD AUTO: 0.9 %
CRP SERPL-MCNC: <0.1 MG/DL
EOSINOPHIL # BLD AUTO: 0.28 K/UL
EOSINOPHIL NFR BLD AUTO: 6.4 %
ERYTHROCYTE [SEDIMENTATION RATE] IN BLOOD BY WESTERGREN METHOD: 55 MM/HR
HCT VFR BLD CALC: 37.1 %
HGB BLD-MCNC: 11.2 G/DL
IMM GRANULOCYTES NFR BLD AUTO: 0.2 %
LYMPHOCYTES # BLD AUTO: 2.17 K/UL
LYMPHOCYTES NFR BLD AUTO: 49.5 %
MAN DIFF?: NORMAL
MCHC RBC-ENTMCNC: 27.7 PG
MCHC RBC-ENTMCNC: 30.2 GM/DL
MCV RBC AUTO: 91.6 FL
MONOCYTES # BLD AUTO: 0.62 K/UL
MONOCYTES NFR BLD AUTO: 14.2 %
NEUTROPHILS # BLD AUTO: 1.26 K/UL
NEUTROPHILS NFR BLD AUTO: 28.8 %
PLATELET # BLD AUTO: 244 K/UL
RBC # BLD: 4.05 M/UL
RBC # FLD: 16.1 %
WBC # FLD AUTO: 4.38 K/UL

## 2019-03-13 LAB — FUNGUS SPEC QL CULT: SIGNIFICANT CHANGE UP

## 2019-03-19 ENCOUNTER — FORM ENCOUNTER (OUTPATIENT)
Age: 69
End: 2019-03-19

## 2019-03-20 ENCOUNTER — OUTPATIENT (OUTPATIENT)
Dept: OUTPATIENT SERVICES | Facility: HOSPITAL | Age: 69
LOS: 1 days | End: 2019-03-20
Payer: MEDICARE

## 2019-03-20 ENCOUNTER — APPOINTMENT (OUTPATIENT)
Dept: CT IMAGING | Facility: IMAGING CENTER | Age: 69
End: 2019-03-20
Payer: MEDICARE

## 2019-03-20 DIAGNOSIS — J18.9 PNEUMONIA, UNSPECIFIED ORGANISM: ICD-10-CM

## 2019-03-20 PROCEDURE — 71250 CT THORAX DX C-: CPT

## 2019-03-20 PROCEDURE — 71250 CT THORAX DX C-: CPT | Mod: 26

## 2019-03-22 ENCOUNTER — APPOINTMENT (OUTPATIENT)
Dept: RHEUMATOLOGY | Facility: CLINIC | Age: 69
End: 2019-03-22
Payer: MEDICARE

## 2019-03-22 VITALS
OXYGEN SATURATION: 97 % | TEMPERATURE: 98.3 F | SYSTOLIC BLOOD PRESSURE: 143 MMHG | DIASTOLIC BLOOD PRESSURE: 82 MMHG | HEART RATE: 62 BPM | HEIGHT: 64 IN

## 2019-03-22 PROCEDURE — 99214 OFFICE O/P EST MOD 30 MIN: CPT

## 2019-03-22 RX ORDER — AZITHROMYCIN 500 MG/1
500 TABLET, FILM COATED ORAL
Qty: 70 | Refills: 0 | Status: DISCONTINUED | COMMUNITY
Start: 2019-02-04 | End: 2019-03-22

## 2019-03-22 RX ORDER — GABAPENTIN 300 MG/1
300 CAPSULE ORAL AT BEDTIME
Qty: 90 | Refills: 0 | Status: DISCONTINUED | COMMUNITY
Start: 2018-11-27 | End: 2019-03-22

## 2019-03-22 RX ORDER — PANTOPRAZOLE 40 MG/1
40 TABLET, DELAYED RELEASE ORAL
Qty: 90 | Refills: 0 | Status: DISCONTINUED | COMMUNITY
Start: 2016-12-07 | End: 2019-03-22

## 2019-03-22 RX ORDER — GOLIMUMAB 50 MG/.5ML
50 INJECTION, SOLUTION SUBCUTANEOUS
Qty: 3 | Refills: 2 | Status: DISCONTINUED | COMMUNITY
Start: 2018-11-30 | End: 2019-03-22

## 2019-03-22 RX ORDER — CEFPODOXIME PROXETIL 100 MG/1
100 TABLET, FILM COATED ORAL
Qty: 14 | Refills: 0 | Status: DISCONTINUED | COMMUNITY
Start: 2019-02-04 | End: 2019-03-22

## 2019-03-22 RX ORDER — DOXYCYCLINE HYCLATE 100 MG/1
100 CAPSULE ORAL
Qty: 14 | Refills: 0 | Status: DISCONTINUED | COMMUNITY
Start: 2018-12-19 | End: 2019-03-22

## 2019-03-26 LAB
ACID FAST STN SPEC: SIGNIFICANT CHANGE UP
FUNGUS BLD CULT: SIGNIFICANT CHANGE UP

## 2019-03-28 ENCOUNTER — RECORD ABSTRACTING (OUTPATIENT)
Age: 69
End: 2019-03-28

## 2019-03-28 DIAGNOSIS — R01.1 CARDIAC MURMUR, UNSPECIFIED: ICD-10-CM

## 2019-03-28 DIAGNOSIS — E04.0 NONTOXIC DIFFUSE GOITER: ICD-10-CM

## 2019-03-28 DIAGNOSIS — B34.9 VIRAL INFECTION, UNSPECIFIED: ICD-10-CM

## 2019-03-28 DIAGNOSIS — R73.03 PREDIABETES.: ICD-10-CM

## 2019-03-28 RX ORDER — CYCLOSPORINE 0.5 MG/ML
0.05 EMULSION OPHTHALMIC
Refills: 0 | Status: ACTIVE | COMMUNITY

## 2019-03-29 ENCOUNTER — APPOINTMENT (OUTPATIENT)
Dept: INTERNAL MEDICINE | Facility: CLINIC | Age: 69
End: 2019-03-29

## 2019-03-29 ENCOUNTER — APPOINTMENT (OUTPATIENT)
Dept: INTERNAL MEDICINE | Facility: CLINIC | Age: 69
End: 2019-03-29
Payer: MEDICARE

## 2019-03-29 VITALS
HEIGHT: 64 IN | BODY MASS INDEX: 35.85 KG/M2 | SYSTOLIC BLOOD PRESSURE: 138 MMHG | HEART RATE: 70 BPM | WEIGHT: 210 LBS | DIASTOLIC BLOOD PRESSURE: 88 MMHG

## 2019-03-29 PROCEDURE — 99213 OFFICE O/P EST LOW 20 MIN: CPT

## 2019-03-29 RX ORDER — MOXIFLOXACIN HYDROCHLORIDE TABLETS, 400 MG 400 MG/1
400 TABLET, FILM COATED ORAL
Qty: 30 | Refills: 0 | Status: DISCONTINUED | COMMUNITY
Start: 2019-02-14

## 2019-03-29 NOTE — REVIEW OF SYSTEMS
[Fever] : no fever [Chills] : no chills [Night Sweats] : no night sweats [Negative] : Cardiovascular [FreeTextEntry2] : as above [FreeTextEntry6] :  as above

## 2019-03-29 NOTE — HISTORY OF PRESENT ILLNESS
[FreeTextEntry1] : f/u of round pneumonia  s/p antibx rx and  fob with afb and fungal cultures all neg  she has remained off  dugs off RA .  there has anand no fever and she no longer ha any cp sob or wheezing  recent ct shows sig decrease in the size of the rul leison

## 2019-03-29 NOTE — PHYSICAL EXAM
[No Acute Distress] : no acute distress [Well Nourished] : well nourished [Well Developed] : well developed [Normal Sclera/Conjunctiva] : normal sclera/conjunctiva [Normal Outer Ear/Nose] : the outer ears and nose were normal in appearance [No JVD] : no jugular venous distention [Supple] : supple [No Lymphadenopathy] : no lymphadenopathy [No Respiratory Distress] : no respiratory distress  [Clear to Auscultation] : lungs were clear to auscultation bilaterally [No Accessory Muscle Use] : no accessory muscle use [Normal Rate] : normal rate  [Regular Rhythm] : with a regular rhythm [Normal S1, S2] : normal S1 and S2

## 2019-04-05 ENCOUNTER — RECORD ABSTRACTING (OUTPATIENT)
Age: 69
End: 2019-04-05

## 2019-06-12 ENCOUNTER — TRANSCRIPTION ENCOUNTER (OUTPATIENT)
Age: 69
End: 2019-06-12

## 2019-06-20 ENCOUNTER — APPOINTMENT (OUTPATIENT)
Dept: ENDOCRINOLOGY | Facility: CLINIC | Age: 69
End: 2019-06-20

## 2019-06-26 ENCOUNTER — APPOINTMENT (OUTPATIENT)
Dept: CT IMAGING | Facility: IMAGING CENTER | Age: 69
End: 2019-06-26

## 2019-06-27 ENCOUNTER — APPOINTMENT (OUTPATIENT)
Dept: ENDOCRINOLOGY | Facility: CLINIC | Age: 69
End: 2019-06-27

## 2019-07-02 ENCOUNTER — FORM ENCOUNTER (OUTPATIENT)
Age: 69
End: 2019-07-02

## 2019-07-03 ENCOUNTER — APPOINTMENT (OUTPATIENT)
Dept: CT IMAGING | Facility: IMAGING CENTER | Age: 69
End: 2019-07-03
Payer: MEDICARE

## 2019-07-03 ENCOUNTER — OUTPATIENT (OUTPATIENT)
Dept: OUTPATIENT SERVICES | Facility: HOSPITAL | Age: 69
LOS: 1 days | End: 2019-07-03
Payer: MEDICARE

## 2019-07-03 DIAGNOSIS — J18.9 PNEUMONIA, UNSPECIFIED ORGANISM: ICD-10-CM

## 2019-07-03 DIAGNOSIS — M06.9 RHEUMATOID ARTHRITIS, UNSPECIFIED: ICD-10-CM

## 2019-07-03 PROCEDURE — 71250 CT THORAX DX C-: CPT

## 2019-07-03 PROCEDURE — 71250 CT THORAX DX C-: CPT | Mod: 26

## 2019-07-10 NOTE — PRE-OP CHECKLIST - PATIENT SENT AT
FREE:[LAST:[PCP],PHONE:[(   )    -],FAX:[(   )    -],ADDRESS:[PCP at [facility]],FOLLOWUP:[2 weeks]] 12-Feb-2019 14:27

## 2019-07-11 ENCOUNTER — APPOINTMENT (OUTPATIENT)
Dept: RHEUMATOLOGY | Facility: CLINIC | Age: 69
End: 2019-07-11
Payer: MEDICARE

## 2019-08-31 NOTE — PROGRESS NOTE ADULT - ASSESSMENT
69 yo F w/t a PMHx of RA on (Simponi, plaquenil, leflunomide), HTN, provoked DVT, R sided thyroid nodules who p/w a 9 day course of pleuritic CP on inspiration, cough, and night sweats with fever and rigors 2/2 infectious vs malignancy vs RA nodules vs PE (least likely given O2 sat). Alert and oriented to person, place and time

## 2019-09-06 ENCOUNTER — APPOINTMENT (OUTPATIENT)
Dept: INTERNAL MEDICINE | Facility: CLINIC | Age: 69
End: 2019-09-06
Payer: MEDICARE

## 2019-09-06 VITALS
DIASTOLIC BLOOD PRESSURE: 78 MMHG | BODY MASS INDEX: 35 KG/M2 | WEIGHT: 205 LBS | HEIGHT: 64 IN | SYSTOLIC BLOOD PRESSURE: 130 MMHG | HEART RATE: 69 BPM

## 2019-09-06 PROCEDURE — 99213 OFFICE O/P EST LOW 20 MIN: CPT

## 2019-09-06 NOTE — HISTORY OF PRESENT ILLNESS
[FreeTextEntry1] :   3 weeks  of stiff neck unable to move neck  toleft without pain  she was in urgent care and given  robaxin  which she says  did not help  she feels  clicking in neck and has pain down the arm  when she tries to rotate the neck  she had rx for om as well she says her ears feel fine

## 2019-09-06 NOTE — PHYSICAL EXAM
[Normal Sclera/Conjunctiva] : normal sclera/conjunctiva [Normal Outer Ear/Nose] : the outer ears and nose were normal in appearance [Normal Oropharynx] : the oropharynx was normal [Normal TMs] : both tympanic membranes were normal [No JVD] : no jugular venous distention [No Accessory Muscle Use] : no accessory muscle use [Clear to Auscultation] : lungs were clear to auscultation bilaterally [Normal Rate] : normal rate  [Regular Rhythm] : with a regular rhythm [Normal S1, S2] : normal S1 and S2

## 2019-09-20 ENCOUNTER — APPOINTMENT (OUTPATIENT)
Dept: RHEUMATOLOGY | Facility: CLINIC | Age: 69
End: 2019-09-20
Payer: MEDICARE

## 2019-09-20 ENCOUNTER — EMERGENCY (EMERGENCY)
Facility: HOSPITAL | Age: 69
LOS: 1 days | Discharge: HOME | End: 2019-09-20
Attending: EMERGENCY MEDICINE | Admitting: EMERGENCY MEDICINE
Payer: MEDICARE

## 2019-09-20 VITALS
RESPIRATION RATE: 16 BRPM | SYSTOLIC BLOOD PRESSURE: 126 MMHG | BODY MASS INDEX: 34.83 KG/M2 | HEART RATE: 48 BPM | TEMPERATURE: 98.1 F | OXYGEN SATURATION: 96 % | WEIGHT: 204 LBS | HEIGHT: 64 IN | DIASTOLIC BLOOD PRESSURE: 78 MMHG

## 2019-09-20 VITALS
DIASTOLIC BLOOD PRESSURE: 68 MMHG | TEMPERATURE: 98 F | SYSTOLIC BLOOD PRESSURE: 144 MMHG | RESPIRATION RATE: 14 BRPM | OXYGEN SATURATION: 99 % | HEART RATE: 53 BPM

## 2019-09-20 VITALS
SYSTOLIC BLOOD PRESSURE: 139 MMHG | DIASTOLIC BLOOD PRESSURE: 63 MMHG | TEMPERATURE: 97 F | OXYGEN SATURATION: 99 % | RESPIRATION RATE: 18 BRPM | HEART RATE: 48 BPM

## 2019-09-20 LAB
ALBUMIN SERPL ELPH-MCNC: 4.1 G/DL — SIGNIFICANT CHANGE UP (ref 3.3–5)
ALP SERPL-CCNC: 50 U/L — SIGNIFICANT CHANGE UP (ref 40–120)
ALT FLD-CCNC: 15 U/L — SIGNIFICANT CHANGE UP (ref 4–33)
ANION GAP SERPL CALC-SCNC: 14 MMO/L — SIGNIFICANT CHANGE UP (ref 7–14)
AST SERPL-CCNC: 21 U/L — SIGNIFICANT CHANGE UP (ref 4–32)
BASE EXCESS BLDV CALC-SCNC: 4.7 MMOL/L — SIGNIFICANT CHANGE UP
BASOPHILS # BLD AUTO: 0.03 K/UL — SIGNIFICANT CHANGE UP (ref 0–0.2)
BASOPHILS NFR BLD AUTO: 0.5 % — SIGNIFICANT CHANGE UP (ref 0–2)
BILIRUB SERPL-MCNC: < 0.2 MG/DL — LOW (ref 0.2–1.2)
BLOOD GAS VENOUS - CREATININE: 0.94 MG/DL — SIGNIFICANT CHANGE UP (ref 0.5–1.3)
BLOOD GAS VENOUS - FIO2: 21 — SIGNIFICANT CHANGE UP
BUN SERPL-MCNC: 19 MG/DL — SIGNIFICANT CHANGE UP (ref 7–23)
CALCIUM SERPL-MCNC: 9.7 MG/DL — SIGNIFICANT CHANGE UP (ref 8.4–10.5)
CHLORIDE BLDV-SCNC: 108 MMOL/L — SIGNIFICANT CHANGE UP (ref 96–108)
CHLORIDE SERPL-SCNC: 101 MMOL/L — SIGNIFICANT CHANGE UP (ref 98–107)
CO2 SERPL-SCNC: 25 MMOL/L — SIGNIFICANT CHANGE UP (ref 22–31)
CREAT SERPL-MCNC: 0.94 MG/DL — SIGNIFICANT CHANGE UP (ref 0.5–1.3)
EOSINOPHIL # BLD AUTO: 0.09 K/UL — SIGNIFICANT CHANGE UP (ref 0–0.5)
EOSINOPHIL NFR BLD AUTO: 1.6 % — SIGNIFICANT CHANGE UP (ref 0–6)
GAS PNL BLDV: 138 MMOL/L — SIGNIFICANT CHANGE UP (ref 136–146)
GLUCOSE BLDV-MCNC: 88 MG/DL — SIGNIFICANT CHANGE UP (ref 70–99)
GLUCOSE SERPL-MCNC: 90 MG/DL — SIGNIFICANT CHANGE UP (ref 70–99)
HCO3 BLDV-SCNC: 27 MMOL/L — SIGNIFICANT CHANGE UP (ref 20–27)
HCT VFR BLD CALC: 38.5 % — SIGNIFICANT CHANGE UP (ref 34.5–45)
HCT VFR BLDV CALC: 39.2 % — SIGNIFICANT CHANGE UP (ref 34.5–45)
HGB BLD-MCNC: 12.3 G/DL — SIGNIFICANT CHANGE UP (ref 11.5–15.5)
HGB BLDV-MCNC: 12.7 G/DL — SIGNIFICANT CHANGE UP (ref 11.5–15.5)
IMM GRANULOCYTES NFR BLD AUTO: 0.4 % — SIGNIFICANT CHANGE UP (ref 0–1.5)
LACTATE BLDV-MCNC: 0.9 MMOL/L — SIGNIFICANT CHANGE UP (ref 0.5–2)
LYMPHOCYTES # BLD AUTO: 2.13 K/UL — SIGNIFICANT CHANGE UP (ref 1–3.3)
LYMPHOCYTES # BLD AUTO: 37.3 % — SIGNIFICANT CHANGE UP (ref 13–44)
MCHC RBC-ENTMCNC: 28 PG — SIGNIFICANT CHANGE UP (ref 27–34)
MCHC RBC-ENTMCNC: 31.9 % — LOW (ref 32–36)
MCV RBC AUTO: 87.7 FL — SIGNIFICANT CHANGE UP (ref 80–100)
MONOCYTES # BLD AUTO: 0.57 K/UL — SIGNIFICANT CHANGE UP (ref 0–0.9)
MONOCYTES NFR BLD AUTO: 10 % — SIGNIFICANT CHANGE UP (ref 2–14)
NEUTROPHILS # BLD AUTO: 2.87 K/UL — SIGNIFICANT CHANGE UP (ref 1.8–7.4)
NEUTROPHILS NFR BLD AUTO: 50.2 % — SIGNIFICANT CHANGE UP (ref 43–77)
NRBC # FLD: 0 K/UL — SIGNIFICANT CHANGE UP (ref 0–0)
PCO2 BLDV: 53 MMHG — HIGH (ref 41–51)
PH BLDV: 7.37 PH — SIGNIFICANT CHANGE UP (ref 7.32–7.43)
PLATELET # BLD AUTO: 236 K/UL — SIGNIFICANT CHANGE UP (ref 150–400)
PMV BLD: 10.2 FL — SIGNIFICANT CHANGE UP (ref 7–13)
PO2 BLDV: 37 MMHG — SIGNIFICANT CHANGE UP (ref 35–40)
POTASSIUM BLDV-SCNC: 3.2 MMOL/L — LOW (ref 3.4–4.5)
POTASSIUM SERPL-MCNC: 4.1 MMOL/L — SIGNIFICANT CHANGE UP (ref 3.5–5.3)
POTASSIUM SERPL-SCNC: 4.1 MMOL/L — SIGNIFICANT CHANGE UP (ref 3.5–5.3)
PROT SERPL-MCNC: 7.7 G/DL — SIGNIFICANT CHANGE UP (ref 6–8.3)
RBC # BLD: 4.39 M/UL — SIGNIFICANT CHANGE UP (ref 3.8–5.2)
RBC # FLD: 14.6 % — HIGH (ref 10.3–14.5)
SAO2 % BLDV: 62 % — SIGNIFICANT CHANGE UP (ref 60–85)
SODIUM SERPL-SCNC: 140 MMOL/L — SIGNIFICANT CHANGE UP (ref 135–145)
TROPONIN T, HIGH SENSITIVITY: 20 NG/L — SIGNIFICANT CHANGE UP (ref ?–14)
TROPONIN T, HIGH SENSITIVITY: 22 NG/L — SIGNIFICANT CHANGE UP (ref ?–14)
TSH SERPL-MCNC: 0.53 UIU/ML — SIGNIFICANT CHANGE UP (ref 0.27–4.2)
WBC # BLD: 5.71 K/UL — SIGNIFICANT CHANGE UP (ref 3.8–10.5)
WBC # FLD AUTO: 5.71 K/UL — SIGNIFICANT CHANGE UP (ref 3.8–10.5)

## 2019-09-20 PROCEDURE — 93000 ELECTROCARDIOGRAM COMPLETE: CPT

## 2019-09-20 PROCEDURE — 93010 ELECTROCARDIOGRAM REPORT: CPT

## 2019-09-20 PROCEDURE — 99215 OFFICE O/P EST HI 40 MIN: CPT | Mod: 25

## 2019-09-20 PROCEDURE — 99284 EMERGENCY DEPT VISIT MOD MDM: CPT | Mod: 25,GC

## 2019-09-20 RX ORDER — LEFLUNOMIDE 20 MG/1
20 TABLET, FILM COATED ORAL
Refills: 0 | Status: DISCONTINUED | COMMUNITY
End: 2019-09-20

## 2019-09-20 RX ORDER — METOPROLOL TARTRATE 50 MG
1 TABLET ORAL
Qty: 10 | Refills: 0
Start: 2019-09-20 | End: 2019-09-29

## 2019-09-20 NOTE — ED ADULT TRIAGE NOTE - CHIEF COMPLAINT QUOTE
Pt went in to see her doctor for routine check up and was found to be bradycardic.  Pt denies chest pain, reports sob with exertion .  Pt denies any dizziness

## 2019-09-20 NOTE — ED PROVIDER NOTE - OBJECTIVE STATEMENT
67 yo F PMHx RA, HTN, hypothyroidism (hasn't taken synthroid in 7 years), presents to ED from her rheumatologist's office today for episode of bradycardia to the 40s. Pt states that she had an episode of palpitations/fluttering sensation in her chest that began while she was seated and resolved spontaneously after 5 minutes. Pt states she had a similar episode in the ED. Pt denies associated CP, SOB, n/v, abd pain, dizziness, HA, syncope, fever, cough. Pt states she takes lopressor 100 mg at night and she did not take her medications today.

## 2019-09-20 NOTE — ED PROVIDER NOTE - NSFOLLOWUPINSTRUCTIONS_ED_ALL_ED_FT
You were seen in the emergency department for a low heart rate.  You had laboratory work done and a copy of your results were given to you.  Please take Metoprolol 50 mg at night (this is half of your normal dose) for the next 10 days or until you can see your cardiologist.  Please return to the emergency department should you develop worsening symptoms.

## 2019-09-20 NOTE — ED PROVIDER NOTE - NS ED ROS FT
Constitutional: no fevers or chills  HEENT: no visual changes, no sore throat, no rhinorrhea  CV: palpitations; no cp  Resp: no sob or cough  GI: no abd pain, no nausea, no vomiting, no diarrhea, no constipation  : no dysuria, no hematuria  MSK: no myalgais or arthralgias  skin: no rashes  neuro: no HA, no confusion; no numbness; no weakness, no tingling  psych: no SI/HI  heme: no LAD

## 2019-09-20 NOTE — ED PROVIDER NOTE - CLINICAL SUMMARY MEDICAL DECISION MAKING FREE TEXT BOX
69 y/o F PMHx HTN, RA, hypothyroid (not on synthroid for 7 years) presents to ED from her rhuematologist's office for episode of bradycardia to 40s. Pt had a 5 minute episode of palpitations/heart fluttering that resolved spontaneously at her doctor's office. Pt takes Lopressor 100 mg at night; did not take any today. Pt currently asymptomatic. VS otherwise stable. Afebrile. Pt exam is normal. Low suspicion for ACS. DDx at this time includes hypothyroidism, electrolyte abnormalities. unlikely lopressor overdose. Will send CBC, CMP, trop, and TSH. Will reeassess.

## 2019-09-20 NOTE — ED PROVIDER NOTE - PATIENT PORTAL LINK FT
You can access the FollowMyHealth Patient Portal offered by [facility] by registering at the following website: [email or web address]. By joining [facility]’s FollowMyHealth portal, you will also be able to view your health information using other applications (apps) compatible with our system.

## 2019-09-20 NOTE — ED PROVIDER NOTE - PROGRESS NOTE DETAILS
Denton Sheth PGY1 reached out to pt's cardiologist, Dr. Law Burnette. Awaiting call back. Denton Sheth PGY1 pt reassessed and reevaluated. HR currently in the high 50s/low 60s. Pt asymptomatic. Reexamination is normal. I was unable to reach the patient's cardiologist. I had an extensive conversation with the patient regarding her options of being discharged home w/ the recommendation of taking Lopressor 50 (half her normal dose) vs being observed in CDU and to be seen by cardiologist in the morning. I explained the risks and benefits to both of these options. Pt elects to go home. Return precautions given to patient. I explained to patient to call first thing tomorrow morning to make an appt w/ her cardiologist for early next week. All questions answered.

## 2019-09-20 NOTE — ED PROVIDER NOTE - ATTENDING CONTRIBUTION TO CARE
I have personally performed a face to face bedside history and physical examination of this patient. I have discussed the history, examination, review of systems, assessment and plan of management with the resident. I have reviewed the electronic medical record and amended it to reflect my history, review of systems, physical exam, assessment and plan.    69 yo F PMHx RA, HTN, hypothyroidism (hasn't taken synthroid in 7 years), presents to ED from her rheumatologist's office today for episode of bradycardia to the 40s. Pt states that she had an episode of palpitations/fluttering sensation in her chest that began while she was seated and resolved spontaneously after 5 minutes.  Denies cp, sob, lightheadedness, syncope.  Tim on arrival, BP stable.  Pt well appearing, non-diaphoretic, aaox3, bradycardic, no murmur, extremities well perfused.  Unclear etiology of bradycardia at this time, but pt stable and largely asymptomatic.  EKG sinus bradycardia without ischemic changes.  Will send labs.  Dispo pending.

## 2019-09-20 NOTE — ED ADULT NURSE NOTE - OBJECTIVE STATEMENT
Pt aa&ox4 PMH HTN and RA presenting from MD office for bradycardia. Pt states she was having routine checkup at rheumatologist office when she was found to be bradycardic. Pt states that she has been feeling slight palpitations that quickly resolve on their own and slight ALVAREZ. Pt placed on monitor sinus inga noted. Pt denies dizziness, weakness, nv, ab pain. 20g IV placed in RT AC, labs sent. Will monitor.

## 2019-09-20 NOTE — ED PROVIDER NOTE - PHYSICAL EXAMINATION
PHYSICAL EXAM:  GENERAL: non-toxic appearing; in no respiratory distress  HEAD: Atraumatic, Normocephalic; no dutton's sign, no periorbital ecchymosis   EYES: PERRL, EOMs intact b/l w/out deficits  ENMT: Moist membranes, no anterior/posterior, or supraclavicular LAD  CHEST/LUNG: CTAB no wheezes/rhonchi/rales  HEART: bradycardic, regular; no murmur/gallops/rubs  ABDOMEN: +BS, soft, NT, ND  EXTREMITIES: No LE edema, +2 radial pulses b/l  MUSCULOSKELETAL: FROM of all 4 extremities;  NERVOUS SYSTEM:  A&Ox3, No motor deficits or sensory deficits; CNII-XII intact; no focal neurologic deficits  Heme/LYMPH: No ecchymosis or bruising or LAD  SKIN:  No new rashes

## 2019-09-23 LAB
25(OH)D3 SERPL-MCNC: 29 NG/ML
ALBUMIN SERPL ELPH-MCNC: 4.1 G/DL
ALP BLD-CCNC: 50 U/L
ALT SERPL-CCNC: 12 U/L
ANION GAP SERPL CALC-SCNC: 14 MMOL/L
AST SERPL-CCNC: 14 U/L
BASOPHILS # BLD AUTO: 0.02 K/UL
BASOPHILS NFR BLD AUTO: 0.5 %
BILIRUB SERPL-MCNC: 0.2 MG/DL
BUN SERPL-MCNC: 18 MG/DL
CALCIUM SERPL-MCNC: 9.8 MG/DL
CHLORIDE SERPL-SCNC: 103 MMOL/L
CO2 SERPL-SCNC: 26 MMOL/L
CREAT SERPL-MCNC: 0.98 MG/DL
CRP SERPL-MCNC: 0.14 MG/DL
EOSINOPHIL # BLD AUTO: 0.09 K/UL
EOSINOPHIL NFR BLD AUTO: 2.2 %
ERYTHROCYTE [SEDIMENTATION RATE] IN BLOOD BY WESTERGREN METHOD: 46 MM/HR
GLUCOSE SERPL-MCNC: 96 MG/DL
HAV IGM SER QL: NONREACTIVE
HBV CORE IGG+IGM SER QL: NONREACTIVE
HBV CORE IGM SER QL: NONREACTIVE
HBV SURFACE AG SER QL: NONREACTIVE
HCT VFR BLD CALC: 38.6 %
HCV AB SER QL: NONREACTIVE
HCV S/CO RATIO: 0.17 S/CO
HGB BLD-MCNC: 12.1 G/DL
IMM GRANULOCYTES NFR BLD AUTO: 0.2 %
LYMPHOCYTES # BLD AUTO: 1.69 K/UL
LYMPHOCYTES NFR BLD AUTO: 40.7 %
MAN DIFF?: NORMAL
MCHC RBC-ENTMCNC: 28.1 PG
MCHC RBC-ENTMCNC: 31.3 GM/DL
MCV RBC AUTO: 89.8 FL
MONOCYTES # BLD AUTO: 0.54 K/UL
MONOCYTES NFR BLD AUTO: 13 %
NEUTROPHILS # BLD AUTO: 1.8 K/UL
NEUTROPHILS NFR BLD AUTO: 43.4 %
PLATELET # BLD AUTO: 233 K/UL
POTASSIUM SERPL-SCNC: 4 MMOL/L
PROT SERPL-MCNC: 6.6 G/DL
RBC # BLD: 4.3 M/UL
RBC # FLD: 15 %
SODIUM SERPL-SCNC: 142 MMOL/L
WBC # FLD AUTO: 4.15 K/UL

## 2019-09-24 LAB
M TB IFN-G BLD-IMP: NEGATIVE
QUANTIFERON TB PLUS MITOGEN MINUS NIL: >10 IU/ML
QUANTIFERON TB PLUS NIL: 0.02 IU/ML
QUANTIFERON TB PLUS TB1 MINUS NIL: 0 IU/ML
QUANTIFERON TB PLUS TB2 MINUS NIL: 0 IU/ML

## 2019-09-25 ENCOUNTER — APPOINTMENT (OUTPATIENT)
Dept: INTERNAL MEDICINE | Facility: CLINIC | Age: 69
End: 2019-09-25

## 2019-10-07 ENCOUNTER — FORM ENCOUNTER (OUTPATIENT)
Age: 69
End: 2019-10-07

## 2019-10-08 ENCOUNTER — APPOINTMENT (OUTPATIENT)
Dept: RADIOLOGY | Facility: IMAGING CENTER | Age: 69
End: 2019-10-08
Payer: MEDICARE

## 2019-10-08 ENCOUNTER — OUTPATIENT (OUTPATIENT)
Dept: OUTPATIENT SERVICES | Facility: HOSPITAL | Age: 69
LOS: 1 days | End: 2019-10-08
Payer: MEDICARE

## 2019-10-08 DIAGNOSIS — R06.02 SHORTNESS OF BREATH: ICD-10-CM

## 2019-10-08 DIAGNOSIS — M06.9 RHEUMATOID ARTHRITIS, UNSPECIFIED: ICD-10-CM

## 2019-10-08 PROCEDURE — 77080 DXA BONE DENSITY AXIAL: CPT | Mod: 26

## 2019-10-08 PROCEDURE — 73610 X-RAY EXAM OF ANKLE: CPT | Mod: 26,50

## 2019-10-08 PROCEDURE — 73630 X-RAY EXAM OF FOOT: CPT | Mod: 26,50

## 2019-10-08 PROCEDURE — 73130 X-RAY EXAM OF HAND: CPT | Mod: 26,50

## 2019-10-08 PROCEDURE — 73630 X-RAY EXAM OF FOOT: CPT

## 2019-10-08 PROCEDURE — 77080 DXA BONE DENSITY AXIAL: CPT

## 2019-10-08 PROCEDURE — 73110 X-RAY EXAM OF WRIST: CPT | Mod: 26,50

## 2019-10-08 PROCEDURE — 73110 X-RAY EXAM OF WRIST: CPT

## 2019-10-08 PROCEDURE — 73130 X-RAY EXAM OF HAND: CPT

## 2019-10-08 PROCEDURE — 73610 X-RAY EXAM OF ANKLE: CPT

## 2019-11-06 ENCOUNTER — APPOINTMENT (OUTPATIENT)
Dept: ENDOCRINOLOGY | Facility: CLINIC | Age: 69
End: 2019-11-06
Payer: MEDICARE

## 2019-11-06 VITALS
SYSTOLIC BLOOD PRESSURE: 140 MMHG | DIASTOLIC BLOOD PRESSURE: 82 MMHG | HEIGHT: 64 IN | WEIGHT: 206 LBS | OXYGEN SATURATION: 96 % | HEART RATE: 67 BPM | BODY MASS INDEX: 35.17 KG/M2

## 2019-11-06 PROCEDURE — 99214 OFFICE O/P EST MOD 30 MIN: CPT

## 2019-11-07 LAB
ESTIMATED AVERAGE GLUCOSE: 126 MG/DL
HBA1C MFR BLD HPLC: 6 %
T4 FREE SERPL-MCNC: 1.3 NG/DL
TSH SERPL-ACNC: 0.45 UIU/ML

## 2019-11-11 ENCOUNTER — MEDICATION RENEWAL (OUTPATIENT)
Age: 69
End: 2019-11-11

## 2019-11-11 ENCOUNTER — CLINICAL ADVICE (OUTPATIENT)
Age: 69
End: 2019-11-11

## 2019-12-23 ENCOUNTER — MEDICATION RENEWAL (OUTPATIENT)
Age: 69
End: 2019-12-23

## 2019-12-31 ENCOUNTER — MEDICATION RENEWAL (OUTPATIENT)
Age: 69
End: 2019-12-31

## 2019-12-31 ENCOUNTER — APPOINTMENT (OUTPATIENT)
Dept: INTERNAL MEDICINE | Facility: CLINIC | Age: 69
End: 2019-12-31
Payer: MEDICARE

## 2019-12-31 VITALS
HEART RATE: 73 BPM | HEIGHT: 64 IN | DIASTOLIC BLOOD PRESSURE: 74 MMHG | BODY MASS INDEX: 35.34 KG/M2 | WEIGHT: 207 LBS | SYSTOLIC BLOOD PRESSURE: 141 MMHG

## 2019-12-31 DIAGNOSIS — M10.9 GOUT, UNSPECIFIED: ICD-10-CM

## 2019-12-31 PROCEDURE — 36415 COLL VENOUS BLD VENIPUNCTURE: CPT

## 2019-12-31 PROCEDURE — 99214 OFFICE O/P EST MOD 30 MIN: CPT | Mod: 25

## 2019-12-31 RX ORDER — ABATACEPT 125 MG/ML
125 INJECTION, SOLUTION SUBCUTANEOUS
Refills: 0 | Status: DISCONTINUED | COMMUNITY
End: 2019-12-31

## 2019-12-31 RX ORDER — PREDNISONE 2.5 MG/1
2.5 TABLET ORAL
Qty: 270 | Refills: 0 | Status: DISCONTINUED | COMMUNITY
Start: 2019-03-22 | End: 2019-12-31

## 2019-12-31 RX ORDER — NAPROXEN 500 MG/1
500 TABLET ORAL
Qty: 28 | Refills: 1 | Status: DISCONTINUED | COMMUNITY
Start: 2019-09-06 | End: 2019-12-31

## 2019-12-31 NOTE — PHYSICAL EXAM
[Normal Outer Ear/Nose] : the outer ears and nose were normal in appearance [Normal Sclera/Conjunctiva] : normal sclera/conjunctiva [Normal] : no acute distress, well nourished, well developed and well-appearing [No Respiratory Distress] : no respiratory distress  [No JVD] : no jugular venous distention [No Accessory Muscle Use] : no accessory muscle use [Normal Rate] : normal rate  [Clear to Auscultation] : lungs were clear to auscultation bilaterally [Regular Rhythm] : with a regular rhythm [Normal S1, S2] : normal S1 and S2 [Coordination Grossly Intact] : coordination grossly intact [Speech Grossly Normal] : speech grossly normal [Normal Gait] : normal gait [Memory Grossly Normal] : memory grossly normal [Normal Mood] : the mood was normal [de-identified] : tenderness over medial metacrapal bones in right hand

## 2019-12-31 NOTE — HISTORY OF PRESENT ILLNESS
[FreeTextEntry1] : f/u  predm htn  obesity  she is watching  her  diet  but has not lost weight  and she does not exercise  also since last week she has had pain and swelling in right hand came on suddenly  was in urgentcare given prednisone for poss gout  no prior history

## 2019-12-31 NOTE — PHYSICAL EXAM
[Normal Sclera/Conjunctiva] : normal sclera/conjunctiva [Normal Outer Ear/Nose] : the outer ears and nose were normal in appearance [Normal] : no acute distress, well nourished, well developed and well-appearing [No JVD] : no jugular venous distention [No Respiratory Distress] : no respiratory distress  [No Accessory Muscle Use] : no accessory muscle use [Normal Rate] : normal rate  [Regular Rhythm] : with a regular rhythm [Clear to Auscultation] : lungs were clear to auscultation bilaterally [Normal S1, S2] : normal S1 and S2 [Coordination Grossly Intact] : coordination grossly intact [Speech Grossly Normal] : speech grossly normal [Normal Gait] : normal gait [Memory Grossly Normal] : memory grossly normal [Normal Mood] : the mood was normal [de-identified] : tenderness over medial metacrapal bones in right hand

## 2020-01-02 LAB — URATE SERPL-MCNC: 4.8 MG/DL

## 2020-01-09 ENCOUNTER — APPOINTMENT (OUTPATIENT)
Dept: ENDOCRINOLOGY | Facility: CLINIC | Age: 70
End: 2020-01-09
Payer: MEDICARE

## 2020-01-09 VITALS
WEIGHT: 208 LBS | HEIGHT: 64 IN | BODY MASS INDEX: 35.51 KG/M2 | DIASTOLIC BLOOD PRESSURE: 72 MMHG | OXYGEN SATURATION: 98 % | SYSTOLIC BLOOD PRESSURE: 130 MMHG | HEART RATE: 66 BPM

## 2020-01-09 PROCEDURE — 10005 FNA BX W/US GDN 1ST LES: CPT

## 2020-01-09 PROCEDURE — 99213 OFFICE O/P EST LOW 20 MIN: CPT | Mod: 25

## 2020-01-09 NOTE — PROCEDURE
[Area of Mass: ______] : mass identified in the [unfilled] [Fine Needle Aspiration] : fine needle aspiration ~T ~C was performed [Patient] : the patient [Risks] : risks [Benefits] : benefits [Consent Obtained] : written consent was obtained prior to the procedure and is detailed in the patient's record [Supine] : the patient was placed in the supine position with the neck extended as tolerated [Alcohol] : alcohol [25 gauge 1.5 inch] : A 25 gauge 1.5 inch needle was used [2 Passes] : 2 passes were made through the mass [Ultrasonic Guidance] : ultrasound guidance was employed [Sent to Histology] : the specimens were prepared in the usual manner and sent to cytopathologist [Tolerated Well] : the patient tolerated the procedure well [No Complications] : there were no complications [de-identified] : Saved for affirma  [FreeTextEntry1] : 69 year old female with history of multinodular goiter now s/p 3.2 cm isthmus nodule\par \par -Will follow up on result\par -Saved for affirma \par -Follow up with Dr. Abbott

## 2020-01-09 NOTE — REVIEW OF SYSTEMS
[Fatigue] : no fatigue [Recent Weight Gain (___ Lbs)] : no recent weight gain [Decreased Appetite] : appetite not decreased [Recent Weight Loss (___ Lbs)] : no recent weight loss [Dry Eyes] : no dryness of the eyes [Blurry Vision] : no blurred vision [Visual Field Defect] : no visual field defect [Dysphagia] : no dysphagia [Eyes Itch] : no itching of the eyes [Dysphonia] : no dysphonia [Neck Pain] : no neck pain [Nasal Congestion] : no nasal congestion [Chest Pain] : no chest pain [Heart Rate Is Slow] : the heart rate was not slow [Palpitations] : no palpitations [Shortness Of Breath] : no shortness of breath [Heart Rate Is Fast] : the heart rate was not fast [Wheezing] : no wheezing was heard [SOB on Exertion] : no shortness of breath during exertion [Cough] : no cough [Nausea] : no nausea [Vomiting] : no vomiting was observed [Constipation] : no constipation [Polyuria] : no polyuria [Diarrhea] : no diarrhea [Irregular Menses] : regular menses [Joint Stiffness] : no joint stiffness [Joint Pain] : no joint pain [Muscle Weakness] : no muscle weakness [Muscle Cramps] : no muscle cramps [Acanthosis] : no acanthosis  [Hirsutism] : no hirsutism [Headache] : no headaches [Depression] : no depression [Tremors] : no tremors [Dizziness] : no dizziness [Anxiety] : no anxiety [Polydipsia] : no polydipsia [Galactorrhea] : no galactorrhea  [Heat Intolerance] : heat tolerant [Cold Intolerance] : cold tolerant [Easy Bleeding] : no ~M tendency for easy bleeding [Easy Bruising] : no tendency for easy bruising [Swelling] : no swelling

## 2020-01-09 NOTE — PHYSICAL EXAM
[Well Nourished] : well nourished [No Acute Distress] : no acute distress [Alert] : alert [Well Developed] : well developed [Normal Sclera/Conjunctiva] : normal sclera/conjunctiva [No Proptosis] : no proptosis [EOMI] : extra ocular movement intact [PERRL] : pupils equal, round and reactive to light [Normal TMs] : both tympanic membranes were normal [Normal Outer Ear/Nose] : the ears and nose were normal in appearance [Normal Hearing] : hearing was normal [No Neck Mass] : no neck mass was observed [Supple] : the neck was supple [Thyroid Not Enlarged] : the thyroid was not enlarged [Normal Rate and Effort] : normal respiratory rhythm and effort [No Respiratory Distress] : no respiratory distress [Clear to Auscultation] : lungs were clear to auscultation bilaterally [Normal Rate] : heart rate was normal  [No Accessory Muscle Use] : no accessory muscle use [Normal S1, S2] : normal S1 and S2 [Regular Rhythm] : with a regular rhythm [Normal Bowel Sounds] : normal bowel sounds [Not Tender] : non-tender [Soft] : abdomen soft [No CVA Tenderness] : no ~M costovertebral angle tenderness [Not Distended] : not distended [No Joint Swelling] : no joint swelling seen [Normal Gait] : normal gait [No Rash] : no rash [No Skin Lesions] : no skin lesions [Normal Reflexes] : deep tendon reflexes were 2+ and symmetric [Oriented x3] : oriented to person, place, and time [No Tremors] : no tremors [No Motor Deficits] : the motor exam was normal [Normal Insight/Judgement] : insight and judgment were intact [Normal Affect] : the affect was normal [Normal Mood] : the mood was normal

## 2020-01-09 NOTE — HISTORY OF PRESENT ILLNESS
[FreeTextEntry1] : 69 year old female with history of multiple thyroid nodules here for follow up\par \par She has multiple nodules, but prominent nodules on the most recent ultrasound is a 3.2 cm heterogenous isthmus nodule and a 1.9 cm spongiform nodule on the right. \par Previously had an FNA In 2011, which nodule and results are not avialable, as per patient results were benign \par She denied any compressive neck symptoms \par No history of thyroid cancer and no history of head or neck radiation exposure \par

## 2020-01-09 NOTE — ASSESSMENT
[FreeTextEntry1] : 69 year old female with history of multinodular goiter now s/p 3.2 cm isthmus nodule\par \par -Will follow up on result\par -Saved for affirma \par -Follow up with Dr. Abbott

## 2020-01-09 NOTE — PROCEDURE
[Fine Needle Aspiration] : fine needle aspiration ~T ~C was performed [Area of Mass: ______] : mass identified in the [unfilled] [Patient] : the patient [Risks] : risks [Benefits] : benefits [Consent Obtained] : written consent was obtained prior to the procedure and is detailed in the patient's record [Supine] : the patient was placed in the supine position with the neck extended as tolerated [Alcohol] : alcohol [25 gauge 1.5 inch] : A 25 gauge 1.5 inch needle was used [2 Passes] : 2 passes were made through the mass [Ultrasonic Guidance] : ultrasound guidance was employed [Sent to Histology] : the specimens were prepared in the usual manner and sent to cytopathologist [Tolerated Well] : the patient tolerated the procedure well [No Complications] : there were no complications [de-identified] : Saved for affirma  [FreeTextEntry1] : 69 year old female with history of multinodular goiter now s/p 3.2 cm isthmus nodule\par \par -Will follow up on result\par -Saved for affirma \par -Follow up with Dr. Abbott

## 2020-01-09 NOTE — PHYSICAL EXAM
[No Acute Distress] : no acute distress [Well Nourished] : well nourished [Alert] : alert [Well Developed] : well developed [Normal Sclera/Conjunctiva] : normal sclera/conjunctiva [No Proptosis] : no proptosis [PERRL] : pupils equal, round and reactive to light [EOMI] : extra ocular movement intact [Normal TMs] : both tympanic membranes were normal [Normal Outer Ear/Nose] : the ears and nose were normal in appearance [Normal Hearing] : hearing was normal [Thyroid Not Enlarged] : the thyroid was not enlarged [Supple] : the neck was supple [No Neck Mass] : no neck mass was observed [Normal Rate and Effort] : normal respiratory rhythm and effort [No Respiratory Distress] : no respiratory distress [Clear to Auscultation] : lungs were clear to auscultation bilaterally [Normal Rate] : heart rate was normal  [No Accessory Muscle Use] : no accessory muscle use [Normal Bowel Sounds] : normal bowel sounds [Normal S1, S2] : normal S1 and S2 [Regular Rhythm] : with a regular rhythm [Soft] : abdomen soft [Not Tender] : non-tender [No CVA Tenderness] : no ~M costovertebral angle tenderness [Not Distended] : not distended [No Joint Swelling] : no joint swelling seen [Normal Gait] : normal gait [No Skin Lesions] : no skin lesions [No Rash] : no rash [Normal Reflexes] : deep tendon reflexes were 2+ and symmetric [No Motor Deficits] : the motor exam was normal [Oriented x3] : oriented to person, place, and time [No Tremors] : no tremors [Normal Insight/Judgement] : insight and judgment were intact [Normal Affect] : the affect was normal [Normal Mood] : the mood was normal

## 2020-01-17 LAB — FNA, THYROID: NORMAL

## 2020-02-27 NOTE — PROGRESS NOTE ADULT - PROBLEM SELECTOR PLAN 4
Known R sided thyroid nodule with plan for outpatient biopsy.  -NTD
no

## 2020-02-28 ENCOUNTER — APPOINTMENT (OUTPATIENT)
Dept: RHEUMATOLOGY | Facility: CLINIC | Age: 70
End: 2020-02-28
Payer: MEDICARE

## 2020-02-28 VITALS
TEMPERATURE: 98 F | HEIGHT: 64 IN | SYSTOLIC BLOOD PRESSURE: 126 MMHG | OXYGEN SATURATION: 98 % | RESPIRATION RATE: 16 BRPM | DIASTOLIC BLOOD PRESSURE: 78 MMHG | BODY MASS INDEX: 34.15 KG/M2 | WEIGHT: 200 LBS | HEART RATE: 68 BPM

## 2020-02-28 PROCEDURE — 99214 OFFICE O/P EST MOD 30 MIN: CPT

## 2020-02-28 PROCEDURE — 90732 PPSV23 VACC 2 YRS+ SUBQ/IM: CPT

## 2020-02-28 PROCEDURE — G0009: CPT

## 2020-02-28 RX ORDER — CYCLOSPORINE 0.5 MG/ML
0.05 EMULSION OPHTHALMIC
Refills: 0 | Status: DISCONTINUED | COMMUNITY
Start: 2017-09-20 | End: 2020-02-28

## 2020-02-28 RX ORDER — TRIAMTERENE AND HYDROCHLOROTHIAZIDE 37.5; 25 MG/1; MG/1
37.5-25 CAPSULE ORAL
Qty: 90 | Refills: 3 | Status: DISCONTINUED | COMMUNITY
End: 2020-02-28

## 2020-02-28 RX ORDER — PREDNISONE 50 MG/1
50 TABLET ORAL
Qty: 5 | Refills: 0 | Status: DISCONTINUED | COMMUNITY
Start: 2019-12-29 | End: 2020-02-28

## 2020-02-28 RX ORDER — OMEPRAZOLE 40 MG/1
40 CAPSULE, DELAYED RELEASE ORAL
Qty: 30 | Refills: 0 | Status: DISCONTINUED | COMMUNITY
Start: 2019-09-06 | End: 2020-02-28

## 2020-02-28 RX ORDER — HYDROXYCHLOROQUINE SULFATE 200 MG/1
200 TABLET ORAL
Refills: 0 | Status: DISCONTINUED | COMMUNITY
End: 2020-02-28

## 2020-02-28 RX ORDER — GABAPENTIN 300 MG/1
300 CAPSULE ORAL AT BEDTIME
Qty: 90 | Refills: 0 | Status: DISCONTINUED | COMMUNITY
Start: 2019-03-22 | End: 2020-02-28

## 2020-03-02 LAB
25(OH)D3 SERPL-MCNC: 16.4 NG/ML
ALBUMIN SERPL ELPH-MCNC: 4.4 G/DL
ALP BLD-CCNC: 52 U/L
ALT SERPL-CCNC: 18 U/L
ANION GAP SERPL CALC-SCNC: 13 MMOL/L
AST SERPL-CCNC: 17 U/L
BASOPHILS # BLD AUTO: 0.03 K/UL
BASOPHILS NFR BLD AUTO: 0.4 %
BILIRUB SERPL-MCNC: 0.2 MG/DL
BUN SERPL-MCNC: 22 MG/DL
CALCIUM SERPL-MCNC: 9.9 MG/DL
CHLORIDE SERPL-SCNC: 101 MMOL/L
CO2 SERPL-SCNC: 29 MMOL/L
CREAT SERPL-MCNC: 0.92 MG/DL
CRP SERPL-MCNC: 0.12 MG/DL
EOSINOPHIL # BLD AUTO: 0.06 K/UL
EOSINOPHIL NFR BLD AUTO: 0.9 %
ERYTHROCYTE [SEDIMENTATION RATE] IN BLOOD BY WESTERGREN METHOD: 59 MM/HR
GLUCOSE SERPL-MCNC: 85 MG/DL
HCT VFR BLD CALC: 41 %
HGB BLD-MCNC: 12.5 G/DL
HLA-B27 RELATED AG QL: NORMAL
IMM GRANULOCYTES NFR BLD AUTO: 0.3 %
LYMPHOCYTES # BLD AUTO: 2.3 K/UL
LYMPHOCYTES NFR BLD AUTO: 34.4 %
MAN DIFF?: NORMAL
MCHC RBC-ENTMCNC: 28.2 PG
MCHC RBC-ENTMCNC: 30.5 GM/DL
MCV RBC AUTO: 92.3 FL
MONOCYTES # BLD AUTO: 0.72 K/UL
MONOCYTES NFR BLD AUTO: 10.8 %
NEUTROPHILS # BLD AUTO: 3.55 K/UL
NEUTROPHILS NFR BLD AUTO: 53.2 %
PLATELET # BLD AUTO: 288 K/UL
POTASSIUM SERPL-SCNC: 4.1 MMOL/L
PROT SERPL-MCNC: 6.9 G/DL
RBC # BLD: 4.44 M/UL
RBC # FLD: 14.1 %
SODIUM SERPL-SCNC: 143 MMOL/L
WBC # FLD AUTO: 6.68 K/UL

## 2020-03-08 ENCOUNTER — APPOINTMENT (OUTPATIENT)
Dept: MRI IMAGING | Facility: IMAGING CENTER | Age: 70
End: 2020-03-08

## 2020-03-11 ENCOUNTER — APPOINTMENT (OUTPATIENT)
Dept: ENDOCRINOLOGY | Facility: CLINIC | Age: 70
End: 2020-03-11
Payer: MEDICARE

## 2020-03-11 VITALS
BODY MASS INDEX: 35.68 KG/M2 | HEIGHT: 64 IN | OXYGEN SATURATION: 98 % | HEART RATE: 65 BPM | WEIGHT: 209 LBS | DIASTOLIC BLOOD PRESSURE: 82 MMHG | SYSTOLIC BLOOD PRESSURE: 136 MMHG

## 2020-03-11 PROCEDURE — 99215 OFFICE O/P EST HI 40 MIN: CPT

## 2020-03-23 ENCOUNTER — RX RENEWAL (OUTPATIENT)
Age: 70
End: 2020-03-23

## 2020-03-27 LAB
ALBUMIN SERPL ELPH-MCNC: 4.5 G/DL
ALP BLD-CCNC: 51 U/L
ALT SERPL-CCNC: 12 U/L
ANION GAP SERPL CALC-SCNC: 16 MMOL/L
AST SERPL-CCNC: 14 U/L
BILIRUB SERPL-MCNC: 0.2 MG/DL
BUN SERPL-MCNC: 15 MG/DL
CALCIUM SERPL-MCNC: 9.8 MG/DL
CHLORIDE SERPL-SCNC: 101 MMOL/L
CHOLEST SERPL-MCNC: 227 MG/DL
CHOLEST/HDLC SERPL: 2.4 RATIO
CO2 SERPL-SCNC: 26 MMOL/L
CREAT SERPL-MCNC: 0.89 MG/DL
ESTIMATED AVERAGE GLUCOSE: 134 MG/DL
GLUCOSE SERPL-MCNC: 71 MG/DL
HBA1C MFR BLD HPLC: 6.3 %
HDLC SERPL-MCNC: 96 MG/DL
LDLC SERPL CALC-MCNC: 112 MG/DL
POTASSIUM SERPL-SCNC: 3.9 MMOL/L
PROT SERPL-MCNC: 6.8 G/DL
SODIUM SERPL-SCNC: 143 MMOL/L
TRIGL SERPL-MCNC: 96 MG/DL
TSH SERPL-ACNC: 0.39 UIU/ML
VIT B12 SERPL-MCNC: 1400 PG/ML

## 2020-04-21 ENCOUNTER — APPOINTMENT (OUTPATIENT)
Dept: INTERNAL MEDICINE | Facility: CLINIC | Age: 70
End: 2020-04-21
Payer: MEDICARE

## 2020-04-21 DIAGNOSIS — R39.9 UNSPECIFIED SYMPTOMS AND SIGNS INVOLVING THE GENITOURINARY SYSTEM: ICD-10-CM

## 2020-04-21 PROCEDURE — 99213 OFFICE O/P EST LOW 20 MIN: CPT | Mod: 95

## 2020-04-21 NOTE — HISTORY OF PRESENT ILLNESS
[Medical Office: ([facility])___] : at the medical office located in  [Patient] : the patient [Home] : at home, [unfilled] , at the time of the visit. [Self] : self [FreeTextEntry1] : has dysuria for a few days freq urination. she has tested positive for covid  test done after her  had it and .  she herself denies cough sob fever diarrhea  she remains on all her bp  meds and is on plaquenil for her RA

## 2020-04-21 NOTE — PHYSICAL EXAM
[No Respiratory Distress] : no respiratory distress  [Normal] : no acute distress, well nourished, well developed and well-appearing [No Accessory Muscle Use] : no accessory muscle use

## 2020-04-27 ENCOUNTER — RX RENEWAL (OUTPATIENT)
Age: 70
End: 2020-04-27

## 2020-05-20 ENCOUNTER — RX RENEWAL (OUTPATIENT)
Age: 70
End: 2020-05-20

## 2020-06-10 ENCOUNTER — APPOINTMENT (OUTPATIENT)
Dept: ENDOCRINOLOGY | Facility: CLINIC | Age: 70
End: 2020-06-10

## 2020-06-12 ENCOUNTER — APPOINTMENT (OUTPATIENT)
Dept: INTERNAL MEDICINE | Facility: CLINIC | Age: 70
End: 2020-06-12
Payer: MEDICARE

## 2020-06-12 VITALS
OXYGEN SATURATION: 64 % | WEIGHT: 198 LBS | DIASTOLIC BLOOD PRESSURE: 71 MMHG | BODY MASS INDEX: 33.8 KG/M2 | SYSTOLIC BLOOD PRESSURE: 142 MMHG | HEIGHT: 64 IN

## 2020-06-12 VITALS — SYSTOLIC BLOOD PRESSURE: 120 MMHG | DIASTOLIC BLOOD PRESSURE: 82 MMHG

## 2020-06-12 DIAGNOSIS — F41.8 OTHER SPECIFIED ANXIETY DISORDERS: ICD-10-CM

## 2020-06-12 PROCEDURE — 99213 OFFICE O/P EST LOW 20 MIN: CPT

## 2020-06-12 RX ORDER — SULFAMETHOXAZOLE AND TRIMETHOPRIM 800; 160 MG/1; MG/1
800-160 TABLET ORAL TWICE DAILY
Qty: 14 | Refills: 0 | Status: DISCONTINUED | COMMUNITY
Start: 2020-04-21 | End: 2020-06-12

## 2020-06-12 NOTE — PHYSICAL EXAM
[Normal] : no acute distress, well nourished, well developed and well-appearing [Normal Outer Ear/Nose] : the outer ears and nose were normal in appearance [No JVD] : no jugular venous distention [Normal Rate] : normal rate  [Regular Rhythm] : with a regular rhythm [Normal S1, S2] : normal S1 and S2

## 2020-06-12 NOTE — HISTORY OF PRESENT ILLNESS
[FreeTextEntry1] : very anxious with palpations and feels depressed from the covid situation  she is not working  she uses trazadone  and she is sleeping well  she i eating well with no wt gain  she remains on bp rx.  she had covid  and her   as a result of covid

## 2020-06-24 ENCOUNTER — APPOINTMENT (OUTPATIENT)
Dept: ENDOCRINOLOGY | Facility: CLINIC | Age: 70
End: 2020-06-24

## 2020-07-14 DIAGNOSIS — R92.2 INCONCLUSIVE MAMMOGRAM: ICD-10-CM

## 2020-07-16 ENCOUNTER — APPOINTMENT (OUTPATIENT)
Dept: RHEUMATOLOGY | Facility: CLINIC | Age: 70
End: 2020-07-16

## 2020-07-22 ENCOUNTER — APPOINTMENT (OUTPATIENT)
Dept: UROLOGY | Facility: CLINIC | Age: 70
End: 2020-07-22
Payer: MEDICARE

## 2020-07-22 VITALS
HEART RATE: 56 BPM | SYSTOLIC BLOOD PRESSURE: 121 MMHG | DIASTOLIC BLOOD PRESSURE: 75 MMHG | RESPIRATION RATE: 16 BRPM | TEMPERATURE: 94.9 F

## 2020-07-22 PROCEDURE — 99204 OFFICE O/P NEW MOD 45 MIN: CPT | Mod: 25

## 2020-07-22 PROCEDURE — 76775 US EXAM ABDO BACK WALL LIM: CPT

## 2020-07-22 PROCEDURE — 51798 US URINE CAPACITY MEASURE: CPT

## 2020-07-22 RX ORDER — ALPRAZOLAM 0.5 MG/1
0.5 TABLET ORAL
Qty: 60 | Refills: 0 | Status: DISCONTINUED | COMMUNITY
Start: 2020-06-12 | End: 2020-07-22

## 2020-07-22 NOTE — HISTORY OF PRESENT ILLNESS
[FreeTextEntry1] : Ms. CRONIN is a 69 year old female presenting for an initial consultation regarding evaluation of and treatment options for UTI sx. States she has been having urinary sx for years\par . 2-3 weeks ago, she developed b/l flank pain, urinary frequency/urgency, frequent small voids, and incomplete emptying. She has had burning sensations in the past, but not recently. Hx of endometriosis, GYN following. \par \par  recently passed away from COVID-19 in April, has been stressed since. She tested positive for COVID-19 in April as well, was asymptomatic. \par \par PVR 7/22/2020: 3\par \par \par

## 2020-07-22 NOTE — ASSESSMENT
[FreeTextEntry1] : 68 y/o postmenopausal female with LUTS and bilat flank pain-latter likely musculoskeletal. \par Urine culture, UA, and cytology ordered.\par Renal US in office today-normal \par Start Estrace cream, 2x per week. \par Following in 3 months.

## 2020-07-22 NOTE — REVIEW OF SYSTEMS
[Dry Eyes] : dryness of the eyes [Constipation] : constipation [Palpitations] : palpitations [Painful [place]] : painful [place] [Genital yeast infection] : genital yeast infection [Sexually Transmitted Disease (STD)] : sexually transmitted disease [Pain during urination] : pain during urination [Wake up at night to urinate  How many times?  ___] : wakes up to urinate [unfilled] times during the night [Slow urine stream] : slow urine stream [Joint Swelling] : joint swelling [Limb Pain] : limb pain [Negative] : Heme/Lymph

## 2020-07-22 NOTE — END OF VISIT
[FreeTextEntry3] : Medical record entries made by the scribe today, were at my direction and personally dictated to them by me, Dr. Isaias Ariza on 07/22/2020. I have reviewed the chart and agree that the record accurately reflects my personal performance of the history, physical exam, assessment, and plan.

## 2020-07-22 NOTE — PHYSICAL EXAM
[General Appearance - Well Developed] : well developed [General Appearance - Well Nourished] : well nourished [Normal Appearance] : normal appearance [Well Groomed] : well groomed [General Appearance - In No Acute Distress] : no acute distress [Edema] : no peripheral edema [Respiration, Rhythm And Depth] : normal respiratory rhythm and effort [Exaggerated Use Of Accessory Muscles For Inspiration] : no accessory muscle use [Abdomen Soft] : soft [Abdomen Tenderness] : non-tender [Costovertebral Angle Tenderness] : no ~M costovertebral angle tenderness [Atrophy] : atrophy [Vaginal Cystocele] : cystocele [Grade ___/4] : a Grade [unfilled] ~U/4 [Normal] : normal [Normal Station and Gait] : the gait and station were normal for the patient's age [] : no rash [No Focal Deficits] : no focal deficits [Oriented To Time, Place, And Person] : oriented to person, place, and time [Affect] : the affect was normal [Mood] : the mood was normal [Not Anxious] : not anxious [No Palpable Adenopathy] : no palpable adenopathy [Vulvar Atrophy] : vulvar atrophy [PVR ___ mL] : The patient had a [unfilled] ~UmL post-void residual [Nl Perineum] : perineum was normal on inspection [Nl Inspection] : the anus was normal on inspection [Mass ___ mm] : There was no urethral mass. [Well Estrogenized] : Vaginal epithelium was poorly estrogenized. [Tenderness] : no tenderness [Distended] : no distention [Rectal Tenderness] : no rectal tenderness

## 2020-07-27 LAB
APPEARANCE: CLEAR
BACTERIA UR CULT: NORMAL
BACTERIA: NEGATIVE
BILIRUBIN URINE: NEGATIVE
BLOOD URINE: NEGATIVE
COLOR: YELLOW
GLUCOSE QUALITATIVE U: NEGATIVE
GRANULAR CASTS: 2 /LPF
HYALINE CASTS: 15 /LPF
KETONES URINE: NEGATIVE
LEUKOCYTE ESTERASE URINE: ABNORMAL
MICROSCOPIC-UA: NORMAL
NITRITE URINE: NEGATIVE
PH URINE: 5.5
PROTEIN URINE: ABNORMAL
RED BLOOD CELLS URINE: 5 /HPF
SPECIFIC GRAVITY URINE: 1.03
SQUAMOUS EPITHELIAL CELLS: 8 /HPF
UROBILINOGEN URINE: ABNORMAL
WHITE BLOOD CELLS URINE: 20 /HPF

## 2020-09-18 ENCOUNTER — RX RENEWAL (OUTPATIENT)
Age: 70
End: 2020-09-18

## 2020-09-21 ENCOUNTER — RX RENEWAL (OUTPATIENT)
Age: 70
End: 2020-09-21

## 2020-09-29 ENCOUNTER — NON-APPOINTMENT (OUTPATIENT)
Age: 70
End: 2020-09-29

## 2020-09-29 ENCOUNTER — APPOINTMENT (OUTPATIENT)
Dept: CARDIOLOGY | Facility: CLINIC | Age: 70
End: 2020-09-29
Payer: MEDICARE

## 2020-09-29 VITALS
WEIGHT: 210 LBS | HEART RATE: 54 BPM | SYSTOLIC BLOOD PRESSURE: 131 MMHG | DIASTOLIC BLOOD PRESSURE: 80 MMHG | BODY MASS INDEX: 35.85 KG/M2 | HEIGHT: 64 IN

## 2020-09-29 PROCEDURE — 93000 ELECTROCARDIOGRAM COMPLETE: CPT

## 2020-09-29 PROCEDURE — 99204 OFFICE O/P NEW MOD 45 MIN: CPT

## 2020-09-29 RX ORDER — METOPROLOL SUCCINATE 50 MG/1
50 TABLET, EXTENDED RELEASE ORAL
Refills: 0 | Status: DISCONTINUED | COMMUNITY
Start: 2016-09-02 | End: 2020-09-29

## 2020-09-29 NOTE — REVIEW OF SYSTEMS
[Fever] : no fever [Chills] : no chills [Shortness Of Breath] : shortness of breath [Dyspnea on exertion] : not dyspnea during exertion [Chest Pain] : no chest pain [Lower Ext Edema] : lower extremity edema [Palpitations] : no palpitations [Cough] : no cough [Abdominal Pain] : no abdominal pain [Heartburn] : no heartburn [Dysphagia] : no dysphagia [Dysuria] : no dysuria [Joint Pain] : no joint pain [Skin: A Rash] : no rash: [Dizziness] : no dizziness [Convulsions] : no convulsions [Anxiety] : no anxiety

## 2020-09-29 NOTE — PHYSICAL EXAM
[General Appearance - Well Developed] : well developed [Normal Appearance] : normal appearance [Well Groomed] : well groomed [General Appearance - Well Nourished] : well nourished [No Deformities] : no deformities [General Appearance - In No Acute Distress] : no acute distress [Normal Conjunctiva] : the conjunctiva exhibited no abnormalities [Eyelids - No Xanthelasma] : the eyelids demonstrated no xanthelasmas [Normal Oral Mucosa] : normal oral mucosa [No Oral Pallor] : no oral pallor [No Oral Cyanosis] : no oral cyanosis [Normal Jugular Venous A Waves Present] : normal jugular venous A waves present [Normal Jugular Venous V Waves Present] : normal jugular venous V waves present [No Jugular Venous Green A Waves] : no jugular venous green A waves [Heart Rate And Rhythm] : heart rate and rhythm were normal [Heart Sounds] : normal S1 and S2 [Murmurs] : no murmurs present [FreeTextEntry1] : trace edema  [Respiration, Rhythm And Depth] : normal respiratory rhythm and effort [Exaggerated Use Of Accessory Muscles For Inspiration] : no accessory muscle use [Auscultation Breath Sounds / Voice Sounds] : lungs were clear to auscultation bilaterally [Abdomen Soft] : soft [Abdomen Tenderness] : non-tender [Abdomen Mass (___ Cm)] : no abdominal mass palpated [Abnormal Walk] : normal gait [Gait - Sufficient For Exercise Testing] : the gait was sufficient for exercise testing [Nail Clubbing] : no clubbing of the fingernails [Cyanosis, Localized] : no localized cyanosis [Petechial Hemorrhages (___cm)] : no petechial hemorrhages [Skin Color & Pigmentation] : normal skin color and pigmentation [] : no rash [No Venous Stasis] : no venous stasis [Skin Lesions] : no skin lesions [No Skin Ulcers] : no skin ulcer [No Xanthoma] : no  xanthoma was observed [Oriented To Time, Place, And Person] : oriented to person, place, and time [Affect] : the affect was normal [Mood] : the mood was normal [No Anxiety] : not feeling anxious

## 2020-09-29 NOTE — HISTORY OF PRESENT ILLNESS
[FreeTextEntry1] : 69F with HTN, RA, preDM who presents for cardiac evaluation, LE swelling\par \par Chronic complaints of joint pains. She notes occasional LE swelling, present all the time. She notes occasional dyspnea, worse with exertion. Denies chest pain. Also notes longstanding hx of sinus bradycardia, rates have been as slow as the 40s, beta blocker doses were adjusted. She notes occasional dizziness feeling, particularly when waking up in the morning. \par \par Lost her  in April to COVID. Remote smoking hx (quit 40+ years ago). Retired X-ray tech. \par \par ECG: Sinus bradycardia with sinus arrhythmia  \par \par Amlodipine 10mg, Benazepril 10mg, Toprol 50mg, Triameterine-HCTZ (not listed)

## 2020-09-29 NOTE — DISCUSSION/SUMMARY
[FreeTextEntry1] : 69F with\par \par 1. LE edema: Trace. Suspect secondary to venous insufficiency, diastolic dysfunction, possible norvasc use. Check echo. She states she takes triamterene HCTZ but not listed on meds. Low salt diet, leg elevation, consider compression stockings\par \par 2. HTN: Well controlled today, cont meds\par \par 3. Sinus bradycardia: ?possible etiology for dizziness. Dc Toprol, and assess BP off\par \par 4. PreDM: Cont to encourage diet, exercise, weight loss\par \par RV 3 months

## 2020-10-26 ENCOUNTER — APPOINTMENT (OUTPATIENT)
Dept: INTERNAL MEDICINE | Facility: CLINIC | Age: 70
End: 2020-10-26
Payer: MEDICARE

## 2020-10-26 PROCEDURE — 93306 TTE W/DOPPLER COMPLETE: CPT

## 2020-10-26 PROCEDURE — 99072 ADDL SUPL MATRL&STAF TM PHE: CPT

## 2020-11-15 ENCOUNTER — RX RENEWAL (OUTPATIENT)
Age: 70
End: 2020-11-15

## 2020-12-19 NOTE — H&P ADULT - PROBLEM SELECTOR PROBLEM 1
Pharmacist Admission Medication Reconciliation Pending Note    Prior to Admission Medications were reviewed by the pharmacist and pended for provider review during admission medication reconciliation.    Medications were pended by the pharmacist at this time as follows:    Pended Admission Order Reconciliation Actions - Randa NELSON Bigg, [facility] 12/18/2020 10:50 PM     Order Name Action Reordered As    Aspirin-Acetaminophen-Caffeine (EXCEDRIN EXTRA STRENGTH PO) Do Not Order for Admission     Carboxymeth-Glycerin-Polysorb (REFRESH OPTIVE ADVANCED OP) Replace at Admission hydrOXYpropyl methylcellulose (ISOPTO TEARS) 0.5 % ophthalmic solution 1 drop    Cholecalciferol (VITAMIN D3) 5000 UNITS Tab Order for Admission cholecalciferol (VITAMIN D) tablet 125 mcg    docusate sodium (COLACE) 100 MG capsule Do Not Order for Admission     cetirizine (ZYRTEC) 10 MG tablet Order for Admission cetirizine (ZyrTEC) tablet 10 mg    hydroCORTisone 0.5 % ointment Order for Admission hydroCORTisone (CORTIZONE) 1 % ointment    nystatin (MYCOSTATIN) 656481 UNIT/GM powder Do Not Order for Admission     metoPROLOL succinate (TOPROL-XL) 50 MG 24 hr tablet Order for Admission metoPROLOL succinate (TOPROL-XL) ER tablet 50 mg    lisinopril (ZESTRIL) 30 MG tablet Order for Admission lisinopril (ZESTRIL) tablet 30 mg    Euthyrox 175 MCG tablet Order for Admission levothyroxine (SYNTHROID, LEVOTHROID) tablet 175 mcg    acetaminophen (TYLENOL) 500 MG tablet Do Not Order for Admission             Orders Pended To Continue For Hospital Stay     ID Description Pended By When Reason    [id number] hydrOXYpropyl methylcellulose (ISOPTO TEARS) 0.5 % ophthalmic solution 1 drop-3 TIMES DAILY Randa Pride, [facility] 12/18/20 2250     [id number] cetirizine (ZyrTEC) tablet 10 mg-DAILY PRN Randa Pride, [facility] 12/18/20 2250     [id number] cholecalciferol (VITAMIN D) tablet 125 mcg-DAILY Randa Pride, [facility] 12/18/20 2250     [id number]  levothyroxine (SYNTHROID, LEVOTHROID) tablet 175 mcg-5 TIMES PER WEEK Randa Pride, [facility] 12/18/20 2250     [id number] hydroCORTisone (CORTIZONE) 1 % ointment-2 TIMES DAILY PRN Randa Pride, [facility] 12/18/20 2250     [id number] lisinopril (ZESTRIL) tablet [facility] 2250     [id number] metoPROLOL succinate (TOPROL-XL) ER tablet 50 mg-DAILY [facility] 12/18/20 2250             Pharmacist Notations:           Orders that are ultimately reconciled and signed during admission medication reconciliation may differ from the pended actions above.    Please contact the pharmacist for questions.    Randa Pride, [facility]  12/18/2020 10:50 PM   Cavitary lesion of lung

## 2021-01-12 ENCOUNTER — APPOINTMENT (OUTPATIENT)
Dept: INTERNAL MEDICINE | Facility: CLINIC | Age: 71
End: 2021-01-12
Payer: MEDICARE

## 2021-01-12 VITALS
SYSTOLIC BLOOD PRESSURE: 115 MMHG | DIASTOLIC BLOOD PRESSURE: 74 MMHG | HEIGHT: 64 IN | HEART RATE: 71 BPM | OXYGEN SATURATION: 96 % | BODY MASS INDEX: 35.51 KG/M2 | WEIGHT: 208 LBS

## 2021-01-12 PROCEDURE — 99072 ADDL SUPL MATRL&STAF TM PHE: CPT

## 2021-01-12 PROCEDURE — 99213 OFFICE O/P EST LOW 20 MIN: CPT

## 2021-01-12 RX ORDER — COLCHICINE 0.6 MG/1
0.6 TABLET ORAL
Qty: 30 | Refills: 0 | Status: DISCONTINUED | COMMUNITY
Start: 2020-11-04 | End: 2021-01-12

## 2021-01-12 NOTE — HISTORY OF PRESENT ILLNESS
[FreeTextEntry1] : c/o for a while of nasal congestion  and  need to clear throat  says hard to braeth through nose and  has choking episodes at nigt.  she wants to take covid alex;t but once needed epi for facial swelling during allergy testing

## 2021-01-12 NOTE — PHYSICAL EXAM
[Normal] : no acute distress, well nourished, well developed and well-appearing [Normal Sclera/Conjunctiva] : normal sclera/conjunctiva [Normal Outer Ear/Nose] : the outer ears and nose were normal in appearance [Normal Oropharynx] : the oropharynx was normal [No JVD] : no jugular venous distention [No Lymphadenopathy] : no lymphadenopathy [Supple] : supple [No Respiratory Distress] : no respiratory distress  [No Accessory Muscle Use] : no accessory muscle use [Clear to Auscultation] : lungs were clear to auscultation bilaterally [Normal Rate] : normal rate  [Regular Rhythm] : with a regular rhythm [Normal S1, S2] : normal S1 and S2

## 2021-02-05 ENCOUNTER — RX RENEWAL (OUTPATIENT)
Age: 71
End: 2021-02-05

## 2021-02-09 ENCOUNTER — RX RENEWAL (OUTPATIENT)
Age: 71
End: 2021-02-09

## 2021-03-31 ENCOUNTER — APPOINTMENT (OUTPATIENT)
Dept: ORTHOPEDIC SURGERY | Facility: CLINIC | Age: 71
End: 2021-03-31

## 2021-04-04 ENCOUNTER — RX RENEWAL (OUTPATIENT)
Age: 71
End: 2021-04-04

## 2021-04-26 ENCOUNTER — NON-APPOINTMENT (OUTPATIENT)
Age: 71
End: 2021-04-26

## 2021-04-26 ENCOUNTER — APPOINTMENT (OUTPATIENT)
Dept: CARDIOLOGY | Facility: CLINIC | Age: 71
End: 2021-04-26
Payer: MEDICARE

## 2021-04-26 VITALS
BODY MASS INDEX: 35.68 KG/M2 | SYSTOLIC BLOOD PRESSURE: 142 MMHG | HEART RATE: 71 BPM | OXYGEN SATURATION: 91 % | DIASTOLIC BLOOD PRESSURE: 74 MMHG | WEIGHT: 209 LBS | HEIGHT: 64 IN

## 2021-04-26 VITALS — SYSTOLIC BLOOD PRESSURE: 130 MMHG | DIASTOLIC BLOOD PRESSURE: 76 MMHG

## 2021-04-26 PROCEDURE — 93000 ELECTROCARDIOGRAM COMPLETE: CPT

## 2021-04-26 PROCEDURE — 99072 ADDL SUPL MATRL&STAF TM PHE: CPT

## 2021-04-26 PROCEDURE — 99214 OFFICE O/P EST MOD 30 MIN: CPT

## 2021-04-26 NOTE — REVIEW OF SYSTEMS
[Dyspnea on exertion] : dyspnea during exertion [Wheezing] : wheezing [Negative] : Heme/Lymph [Cough] : no cough [Abdominal Pain] : no abdominal pain [Change in Appetite] : no change in appetite

## 2021-04-26 NOTE — PHYSICAL EXAM
[Well Developed] : well developed [Well Nourished] : well nourished [No Acute Distress] : no acute distress [Normal Conjunctiva] : normal conjunctiva [Normal Venous Pressure] : normal venous pressure [No Carotid Bruit] : no carotid bruit [Normal S1, S2] : normal S1, S2 [No Murmur] : no murmur [No Rub] : no rub [No Gallop] : no gallop [Clear Lung Fields] : clear lung fields [Good Air Entry] : good air entry [No Respiratory Distress] : no respiratory distress  [Soft] : abdomen soft [Non Tender] : non-tender [No Masses/organomegaly] : no masses/organomegaly [Normal Bowel Sounds] : normal bowel sounds [Normal Gait] : normal gait [No Edema] : no edema [No Cyanosis] : no cyanosis [No Clubbing] : no clubbing [No Varicosities] : no varicosities [No Rash] : no rash [No Skin Lesions] : no skin lesions [Moves all extremities] : moves all extremities [No Focal Deficits] : no focal deficits [Normal Speech] : normal speech [Alert and Oriented] : alert and oriented [Normal memory] : normal memory [Abdomen Mass (___ Cm)] : no abdominal mass palpated [] : no ischemic changes

## 2021-04-26 NOTE — HISTORY OF PRESENT ILLNESS
[FreeTextEntry1] : 70F with HTN, RA, preDM who presents for cardiac follow up\par \par She continues to note worsening dyspnea on exertion \par BB d/c'ed, dizziness had improved. HR in 70s today\par Occasional "spongy" sound when breathing\par No CP\par \par Sister recently passed away from CHF, may have been secondary to chemo. Lost  in April '20 to COVID\par \par Remote smoking hx (quit 40+ years ago). Retired X-ray tech. \par \par ECG: Sinus bradycardia with sinus arrhythmia  \par \par Amlodipine 10mg, Benazepril 10mg, Triameterine-HCTZ 37-25mg

## 2021-04-26 NOTE — DISCUSSION/SUMMARY
[FreeTextEntry1] : 70F with\par \par ALVAREZ: May be secondary to underlying diastolic dysfunction, weight, deconditioning. ?Underlying pulmonary disease, to discuss with pulmonary\par \par LE edema: Trace: Secondary to venous insufficiency, diastolic dysfunction, possible norvasc use.\par \par Low salt diet, leg elevation, consider compression stockings\par \par HTN: Well controlled today, cont meds\par \par Sinus bradycardia: Improved off BB\par \par PreDM, borderline lipids: Cont to encourage diet, exercise, weight loss\par \par RV 4 months \par EST

## 2021-05-03 ENCOUNTER — APPOINTMENT (OUTPATIENT)
Dept: INTERNAL MEDICINE | Facility: CLINIC | Age: 71
End: 2021-05-03
Payer: MEDICARE

## 2021-05-03 VITALS
BODY MASS INDEX: 35.51 KG/M2 | HEART RATE: 70 BPM | SYSTOLIC BLOOD PRESSURE: 156 MMHG | OXYGEN SATURATION: 97 % | HEIGHT: 64 IN | WEIGHT: 208 LBS | DIASTOLIC BLOOD PRESSURE: 82 MMHG

## 2021-05-03 VITALS — DIASTOLIC BLOOD PRESSURE: 74 MMHG | SYSTOLIC BLOOD PRESSURE: 132 MMHG

## 2021-05-03 DIAGNOSIS — Z11.59 ENCOUNTER FOR SCREENING FOR OTHER VIRAL DISEASES: ICD-10-CM

## 2021-05-03 PROCEDURE — 93000 ELECTROCARDIOGRAM COMPLETE: CPT | Mod: 59

## 2021-05-03 PROCEDURE — 99072 ADDL SUPL MATRL&STAF TM PHE: CPT

## 2021-05-03 PROCEDURE — 81003 URINALYSIS AUTO W/O SCOPE: CPT | Mod: QW

## 2021-05-03 PROCEDURE — G0439: CPT

## 2021-05-03 RX ORDER — IPRATROPIUM BROMIDE 42 UG/1
0.06 SPRAY NASAL 3 TIMES DAILY
Qty: 1 | Refills: 0 | Status: DISCONTINUED | COMMUNITY
End: 2021-05-03

## 2021-05-03 RX ORDER — ESCITALOPRAM OXALATE 10 MG/1
10 TABLET ORAL DAILY
Qty: 90 | Refills: 0 | Status: DISCONTINUED | COMMUNITY
Start: 2020-06-12 | End: 2021-05-03

## 2021-05-03 NOTE — HEALTH RISK ASSESSMENT
[Patient reported mammogram was normal] : Patient reported mammogram was normal [Patient reported PAP Smear was normal] : Patient reported PAP Smear was normal [Patient reported colonoscopy was normal] : Patient reported colonoscopy was normal [MammogramDate] : 2020 [PapSmearDate] : 2020 [ColonoscopyDate] : 4-5 years ago [ColonoscopyComments] : ha history  of polyps in past

## 2021-05-03 NOTE — HISTORY OF PRESENT ILLNESS
[FreeTextEntry1] : she says she  has been increasingly sob over the last 2 years.  last ct chest 2019 showed resolving  upper lobe pneumonia but no significant parenchymal  disease otherwise.  she has been very sedentary she is on rx for RA

## 2021-05-04 LAB
24R-OH-CALCIDIOL SERPL-MCNC: 31.9 PG/ML
ALBUMIN SERPL ELPH-MCNC: 4.4 G/DL
ALP BLD-CCNC: 56 U/L
ALT SERPL-CCNC: 17 U/L
ANION GAP SERPL CALC-SCNC: 11 MMOL/L
AST SERPL-CCNC: 20 U/L
BASOPHILS # BLD AUTO: 0.02 K/UL
BASOPHILS NFR BLD AUTO: 0.4 %
BILIRUB SERPL-MCNC: 0.2 MG/DL
BUN SERPL-MCNC: 16 MG/DL
CALCIUM SERPL-MCNC: 9.8 MG/DL
CHLORIDE SERPL-SCNC: 101 MMOL/L
CHOLEST SERPL-MCNC: 225 MG/DL
CO2 SERPL-SCNC: 29 MMOL/L
CREAT SERPL-MCNC: 0.96 MG/DL
EOSINOPHIL # BLD AUTO: 0.08 K/UL
EOSINOPHIL NFR BLD AUTO: 1.6 %
ESTIMATED AVERAGE GLUCOSE: 126 MG/DL
GLUCOSE SERPL-MCNC: 102 MG/DL
HBA1C MFR BLD HPLC: 6 %
HCT VFR BLD CALC: 40.5 %
HDLC SERPL-MCNC: 94 MG/DL
HGB BLD-MCNC: 12.8 G/DL
IMM GRANULOCYTES NFR BLD AUTO: 0.2 %
LDLC SERPL CALC-MCNC: 111 MG/DL
LYMPHOCYTES # BLD AUTO: 1.8 K/UL
LYMPHOCYTES NFR BLD AUTO: 35.4 %
MAN DIFF?: NORMAL
MCHC RBC-ENTMCNC: 28.7 PG
MCHC RBC-ENTMCNC: 31.6 GM/DL
MCV RBC AUTO: 90.8 FL
MONOCYTES # BLD AUTO: 0.66 K/UL
MONOCYTES NFR BLD AUTO: 13 %
NEUTROPHILS # BLD AUTO: 2.51 K/UL
NEUTROPHILS NFR BLD AUTO: 49.4 %
NONHDLC SERPL-MCNC: 132 MG/DL
PLATELET # BLD AUTO: 257 K/UL
POTASSIUM SERPL-SCNC: 4 MMOL/L
PROT SERPL-MCNC: 6.8 G/DL
RBC # BLD: 4.46 M/UL
RBC # FLD: 13.9 %
SODIUM SERPL-SCNC: 141 MMOL/L
TRIGL SERPL-MCNC: 103 MG/DL
WBC # FLD AUTO: 5.08 K/UL

## 2021-05-05 ENCOUNTER — APPOINTMENT (OUTPATIENT)
Dept: RHEUMATOLOGY | Facility: CLINIC | Age: 71
End: 2021-05-05
Payer: MEDICARE

## 2021-05-05 ENCOUNTER — RESULT REVIEW (OUTPATIENT)
Age: 71
End: 2021-05-05

## 2021-05-05 VITALS
RESPIRATION RATE: 16 BRPM | HEART RATE: 74 BPM | OXYGEN SATURATION: 98 % | DIASTOLIC BLOOD PRESSURE: 78 MMHG | BODY MASS INDEX: 35.17 KG/M2 | WEIGHT: 206 LBS | HEIGHT: 64 IN | TEMPERATURE: 97.8 F | SYSTOLIC BLOOD PRESSURE: 126 MMHG

## 2021-05-05 PROCEDURE — 99215 OFFICE O/P EST HI 40 MIN: CPT

## 2021-05-05 PROCEDURE — 99072 ADDL SUPL MATRL&STAF TM PHE: CPT

## 2021-05-06 LAB
25(OH)D3 SERPL-MCNC: 28 NG/ML
ALBUMIN SERPL ELPH-MCNC: 4.5 G/DL
ALP BLD-CCNC: 61 U/L
ALT SERPL-CCNC: 18 U/L
ANION GAP SERPL CALC-SCNC: 13 MMOL/L
AST SERPL-CCNC: 18 U/L
BASOPHILS # BLD AUTO: 0.03 K/UL
BASOPHILS NFR BLD AUTO: 0.6 %
BILIRUB SERPL-MCNC: 0.2 MG/DL
BUN SERPL-MCNC: 20 MG/DL
CALCIUM SERPL-MCNC: 10.4 MG/DL
CHLORIDE SERPL-SCNC: 102 MMOL/L
CO2 SERPL-SCNC: 27 MMOL/L
COVID-19 NUCLEOCAPSID  GAM ANTIBODY INTERPRETATION: POSITIVE
COVID-19 SPIKE DOMAIN ANTIBODY INTERPRETATION: POSITIVE
CREAT SERPL-MCNC: 1.03 MG/DL
CRP SERPL-MCNC: <3 MG/L
EOSINOPHIL # BLD AUTO: 0.05 K/UL
EOSINOPHIL NFR BLD AUTO: 1 %
ERYTHROCYTE [SEDIMENTATION RATE] IN BLOOD BY WESTERGREN METHOD: 46 MM/HR
GLUCOSE SERPL-MCNC: 93 MG/DL
HAV IGM SER QL: NONREACTIVE
HBV CORE IGG+IGM SER QL: NONREACTIVE
HBV CORE IGM SER QL: NONREACTIVE
HBV SURFACE AG SER QL: NONREACTIVE
HCT VFR BLD CALC: 42 %
HCV AB SER QL: NONREACTIVE
HCV S/CO RATIO: 0.2 S/CO
HGB BLD-MCNC: 12.9 G/DL
IMM GRANULOCYTES NFR BLD AUTO: 0.2 %
LYMPHOCYTES # BLD AUTO: 1.63 K/UL
LYMPHOCYTES NFR BLD AUTO: 32.3 %
MAN DIFF?: NORMAL
MCHC RBC-ENTMCNC: 28.1 PG
MCHC RBC-ENTMCNC: 30.7 GM/DL
MCV RBC AUTO: 91.5 FL
MONOCYTES # BLD AUTO: 0.49 K/UL
MONOCYTES NFR BLD AUTO: 9.7 %
NEUTROPHILS # BLD AUTO: 2.83 K/UL
NEUTROPHILS NFR BLD AUTO: 56.2 %
PLATELET # BLD AUTO: 247 K/UL
POTASSIUM SERPL-SCNC: 4.3 MMOL/L
PROT SERPL-MCNC: 7.1 G/DL
RBC # BLD: 4.59 M/UL
RBC # FLD: 13.8 %
SARS-COV-2 AB SERPL IA-ACNC: >250 U/ML
SARS-COV-2 AB SERPL QL IA: 114 INDEX
SODIUM SERPL-SCNC: 141 MMOL/L
WBC # FLD AUTO: 5.04 K/UL

## 2021-05-07 LAB
M TB IFN-G BLD-IMP: NEGATIVE
QUANTIFERON TB PLUS MITOGEN MINUS NIL: 8.75 IU/ML
QUANTIFERON TB PLUS NIL: 0.04 IU/ML
QUANTIFERON TB PLUS TB1 MINUS NIL: -0.01 IU/ML
QUANTIFERON TB PLUS TB2 MINUS NIL: 0.02 IU/ML

## 2021-05-14 ENCOUNTER — APPOINTMENT (OUTPATIENT)
Dept: RADIOLOGY | Facility: IMAGING CENTER | Age: 71
End: 2021-05-14
Payer: MEDICARE

## 2021-05-14 ENCOUNTER — OUTPATIENT (OUTPATIENT)
Dept: OUTPATIENT SERVICES | Facility: HOSPITAL | Age: 71
LOS: 1 days | End: 2021-05-14
Payer: MEDICARE

## 2021-05-14 ENCOUNTER — APPOINTMENT (OUTPATIENT)
Dept: CT IMAGING | Facility: IMAGING CENTER | Age: 71
End: 2021-05-14
Payer: MEDICARE

## 2021-05-14 DIAGNOSIS — U07.1 COVID-19: ICD-10-CM

## 2021-05-14 DIAGNOSIS — M06.9 RHEUMATOID ARTHRITIS, UNSPECIFIED: ICD-10-CM

## 2021-05-14 DIAGNOSIS — Z00.8 ENCOUNTER FOR OTHER GENERAL EXAMINATION: ICD-10-CM

## 2021-05-14 DIAGNOSIS — R91.8 OTHER NONSPECIFIC ABNORMAL FINDING OF LUNG FIELD: ICD-10-CM

## 2021-05-14 PROCEDURE — 73130 X-RAY EXAM OF HAND: CPT | Mod: 26,50

## 2021-05-14 PROCEDURE — 71250 CT THORAX DX C-: CPT | Mod: 26

## 2021-05-14 PROCEDURE — 73110 X-RAY EXAM OF WRIST: CPT | Mod: 26,50

## 2021-05-14 PROCEDURE — 73610 X-RAY EXAM OF ANKLE: CPT | Mod: 26,50

## 2021-05-14 PROCEDURE — 73630 X-RAY EXAM OF FOOT: CPT | Mod: 26,50

## 2021-05-14 PROCEDURE — 71250 CT THORAX DX C-: CPT

## 2021-05-14 PROCEDURE — 73110 X-RAY EXAM OF WRIST: CPT

## 2021-05-14 PROCEDURE — 73630 X-RAY EXAM OF FOOT: CPT

## 2021-05-14 PROCEDURE — 73130 X-RAY EXAM OF HAND: CPT

## 2021-05-14 PROCEDURE — 73610 X-RAY EXAM OF ANKLE: CPT

## 2021-05-28 ENCOUNTER — APPOINTMENT (OUTPATIENT)
Dept: CARDIOLOGY | Facility: CLINIC | Age: 71
End: 2021-05-28
Payer: MEDICARE

## 2021-05-28 ENCOUNTER — APPOINTMENT (OUTPATIENT)
Dept: PULMONOLOGY | Facility: CLINIC | Age: 71
End: 2021-05-28

## 2021-05-28 PROCEDURE — 99214 OFFICE O/P EST MOD 30 MIN: CPT | Mod: 25

## 2021-05-28 PROCEDURE — 93015 CV STRESS TEST SUPVJ I&R: CPT

## 2021-05-28 PROCEDURE — 99072 ADDL SUPL MATRL&STAF TM PHE: CPT

## 2021-06-05 ENCOUNTER — APPOINTMENT (OUTPATIENT)
Dept: DISASTER EMERGENCY | Facility: CLINIC | Age: 71
End: 2021-06-05

## 2021-06-07 LAB — SARS-COV-2 N GENE NPH QL NAA+PROBE: NOT DETECTED

## 2021-06-09 ENCOUNTER — APPOINTMENT (OUTPATIENT)
Dept: PULMONOLOGY | Facility: CLINIC | Age: 71
End: 2021-06-09
Payer: MEDICARE

## 2021-06-09 VITALS — TEMPERATURE: 98.3 F | BODY MASS INDEX: 37.91 KG/M2 | WEIGHT: 206 LBS | HEIGHT: 62 IN | HEART RATE: 63 BPM

## 2021-06-09 PROCEDURE — 99072 ADDL SUPL MATRL&STAF TM PHE: CPT

## 2021-06-09 PROCEDURE — 94010 BREATHING CAPACITY TEST: CPT

## 2021-06-09 PROCEDURE — 94729 DIFFUSING CAPACITY: CPT

## 2021-06-09 PROCEDURE — 94726 PLETHYSMOGRAPHY LUNG VOLUMES: CPT

## 2021-06-29 ENCOUNTER — APPOINTMENT (OUTPATIENT)
Dept: INTERNAL MEDICINE | Facility: CLINIC | Age: 71
End: 2021-06-29
Payer: MEDICARE

## 2021-06-29 VITALS
OXYGEN SATURATION: 97 % | HEIGHT: 62 IN | SYSTOLIC BLOOD PRESSURE: 134 MMHG | HEART RATE: 75 BPM | TEMPERATURE: 97.6 F | DIASTOLIC BLOOD PRESSURE: 81 MMHG | BODY MASS INDEX: 38.28 KG/M2 | WEIGHT: 208 LBS

## 2021-06-29 PROCEDURE — 99072 ADDL SUPL MATRL&STAF TM PHE: CPT

## 2021-06-29 PROCEDURE — 99214 OFFICE O/P EST MOD 30 MIN: CPT

## 2021-06-29 RX ORDER — PREDNISONE 2.5 MG/1
2.5 TABLET ORAL
Qty: 90 | Refills: 1 | Status: DISCONTINUED | COMMUNITY
Start: 2020-07-10 | End: 2021-06-29

## 2021-06-29 NOTE — HISTORY OF PRESENT ILLNESS
[FreeTextEntry1] : f/u  sob.  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  she says she is sleeping poorly and has been waking up with headaches and is not refreshed  she snores  she says she is nopt exercising much and she is not watching her diet re a1c  remains on saame bp rx  with no def side effects

## 2021-06-29 NOTE — PHYSICAL EXAM
[Normal Sclera/Conjunctiva] : normal sclera/conjunctiva [Normal Outer Ear/Nose] : the outer ears and nose were normal in appearance [No JVD] : no jugular venous distention [No Respiratory Distress] : no respiratory distress  [No Accessory Muscle Use] : no accessory muscle use [Clear to Auscultation] : lungs were clear to auscultation bilaterally [Normal Rate] : normal rate  [Regular Rhythm] : with a regular rhythm [Normal S1, S2] : normal S1 and S2 [No Edema] : there was no peripheral edema [Coordination Grossly Intact] : coordination grossly intact [Normal Gait] : normal gait [Normal] : affect was normal and insight and judgment were intact

## 2021-08-08 ENCOUNTER — RX RENEWAL (OUTPATIENT)
Age: 71
End: 2021-08-08

## 2021-10-05 ENCOUNTER — APPOINTMENT (OUTPATIENT)
Dept: INTERNAL MEDICINE | Facility: CLINIC | Age: 71
End: 2021-10-05
Payer: MEDICARE

## 2021-10-05 VITALS
SYSTOLIC BLOOD PRESSURE: 128 MMHG | OXYGEN SATURATION: 98 % | TEMPERATURE: 97.8 F | WEIGHT: 204 LBS | DIASTOLIC BLOOD PRESSURE: 81 MMHG | BODY MASS INDEX: 37.54 KG/M2 | HEART RATE: 69 BPM | HEIGHT: 62 IN

## 2021-10-05 PROCEDURE — 99214 OFFICE O/P EST MOD 30 MIN: CPT

## 2021-10-05 NOTE — HISTORY OF PRESENT ILLNESS
[FreeTextEntry1] :  lately  she has been sleeping  poorly and waking with bad headaches   she has history  od sukhwinder 30 years ago but has not used  cpap in many years.she is tired during the day  she has a lot stress  she remains on the same bp rx   she is dieting  but not exercising as her RA has been more active

## 2021-10-10 ENCOUNTER — RX RENEWAL (OUTPATIENT)
Age: 71
End: 2021-10-10

## 2021-10-18 ENCOUNTER — APPOINTMENT (OUTPATIENT)
Dept: RHEUMATOLOGY | Facility: CLINIC | Age: 71
End: 2021-10-18

## 2021-10-31 ENCOUNTER — RX RENEWAL (OUTPATIENT)
Age: 71
End: 2021-10-31

## 2021-11-09 ENCOUNTER — APPOINTMENT (OUTPATIENT)
Dept: INTERNAL MEDICINE | Facility: CLINIC | Age: 71
End: 2021-11-09
Payer: MEDICARE

## 2021-11-09 VITALS
SYSTOLIC BLOOD PRESSURE: 155 MMHG | DIASTOLIC BLOOD PRESSURE: 83 MMHG | HEART RATE: 90 BPM | BODY MASS INDEX: 36.44 KG/M2 | TEMPERATURE: 98 F | OXYGEN SATURATION: 98 % | HEIGHT: 62 IN | WEIGHT: 198 LBS

## 2021-11-09 VITALS — DIASTOLIC BLOOD PRESSURE: 90 MMHG | SYSTOLIC BLOOD PRESSURE: 140 MMHG

## 2021-11-09 DIAGNOSIS — Z23 ENCOUNTER FOR IMMUNIZATION: ICD-10-CM

## 2021-11-09 LAB
BILIRUB UR QL STRIP: NEGATIVE
CLARITY UR: CLEAR
COLLECTION METHOD: NORMAL
GLUCOSE UR-MCNC: NEGATIVE
HCG UR QL: 0.2 EU/DL
HGB UR QL STRIP.AUTO: NEGATIVE
KETONES UR-MCNC: NEGATIVE
LEUKOCYTE ESTERASE UR QL STRIP: NORMAL
NITRITE UR QL STRIP: NEGATIVE
PH UR STRIP: 5
PROT UR STRIP-MCNC: NEGATIVE
SP GR UR STRIP: 1.03

## 2021-11-09 PROCEDURE — G0008: CPT

## 2021-11-09 PROCEDURE — G0444 DEPRESSION SCREEN ANNUAL: CPT | Mod: 59

## 2021-11-09 PROCEDURE — G0439: CPT

## 2021-11-09 PROCEDURE — 36415 COLL VENOUS BLD VENIPUNCTURE: CPT

## 2021-11-09 PROCEDURE — 81003 URINALYSIS AUTO W/O SCOPE: CPT | Mod: QW

## 2021-11-09 PROCEDURE — G0402 INITIAL PREVENTIVE EXAM: CPT

## 2021-11-09 PROCEDURE — 90662 IIV NO PRSV INCREASED AG IM: CPT

## 2021-11-09 RX ORDER — ESTRADIOL 0.1 MG/G
0.1 CREAM VAGINAL
Qty: 1 | Refills: 2 | Status: DISCONTINUED | COMMUNITY
Start: 2020-07-23 | End: 2021-11-09

## 2021-11-09 NOTE — HEALTH RISK ASSESSMENT
[1] : 2) Feeling down, depressed, or hopeless for several days (1) [Patient reported mammogram was normal] : Patient reported mammogram was normal [Patient reported PAP Smear was normal] : Patient reported PAP Smear was normal [Patient reported bone density results were normal] : Patient reported bone density results were normal [Patient reported colonoscopy was abnormal] : Patient reported colonoscopy was abnormal [No] : No [] : No [de-identified] : 4no exercise [MammogramDate] : 2021 [PapSmearDate] : 2019 [BoneDensityDate] : 2019 [ColonoscopyDate] : 4 years ago [ColonoscopyComments] : polyps

## 2021-11-12 LAB
ALBUMIN SERPL ELPH-MCNC: 4.4 G/DL
ALP BLD-CCNC: 58 U/L
ALT SERPL-CCNC: 27 U/L
ANION GAP SERPL CALC-SCNC: 16 MMOL/L
AST SERPL-CCNC: 28 U/L
BASOPHILS # BLD AUTO: 0.02 K/UL
BASOPHILS NFR BLD AUTO: 0.5 %
BILIRUB SERPL-MCNC: 0.3 MG/DL
BUN SERPL-MCNC: 20 MG/DL
CALCIUM SERPL-MCNC: 10.3 MG/DL
CHLORIDE SERPL-SCNC: 102 MMOL/L
CHOLEST SERPL-MCNC: 277 MG/DL
CO2 SERPL-SCNC: 24 MMOL/L
CREAT SERPL-MCNC: 0.82 MG/DL
EOSINOPHIL # BLD AUTO: 0.1 K/UL
EOSINOPHIL NFR BLD AUTO: 2.5 %
ESTIMATED AVERAGE GLUCOSE: 111 MG/DL
GLUCOSE SERPL-MCNC: 97 MG/DL
HBA1C MFR BLD HPLC: 5.5 %
HCT VFR BLD CALC: 40.4 %
HDLC SERPL-MCNC: 89 MG/DL
HGB BLD-MCNC: 12.5 G/DL
IMM GRANULOCYTES NFR BLD AUTO: 0 %
LDLC SERPL CALC-MCNC: 158 MG/DL
LYMPHOCYTES # BLD AUTO: 1.37 K/UL
LYMPHOCYTES NFR BLD AUTO: 34.7 %
MAN DIFF?: NORMAL
MCHC RBC-ENTMCNC: 27.7 PG
MCHC RBC-ENTMCNC: 30.9 GM/DL
MCV RBC AUTO: 89.4 FL
MONOCYTES # BLD AUTO: 0.56 K/UL
MONOCYTES NFR BLD AUTO: 14.2 %
NEUTROPHILS # BLD AUTO: 1.9 K/UL
NEUTROPHILS NFR BLD AUTO: 48.1 %
NONHDLC SERPL-MCNC: 188 MG/DL
PLATELET # BLD AUTO: 227 K/UL
POTASSIUM SERPL-SCNC: 3.6 MMOL/L
PROT SERPL-MCNC: 6.8 G/DL
RBC # BLD: 4.52 M/UL
RBC # FLD: 15.2 %
SODIUM SERPL-SCNC: 142 MMOL/L
TRIGL SERPL-MCNC: 150 MG/DL
TSH SERPL-ACNC: 0.66 UIU/ML
WBC # FLD AUTO: 3.95 K/UL

## 2021-11-17 DIAGNOSIS — Z12.11 ENCOUNTER FOR SCREENING FOR MALIGNANT NEOPLASM OF COLON: ICD-10-CM

## 2021-11-17 LAB — T4 FREE SERPL DIALY-MCNC: 1.8 NG/DL

## 2021-11-29 ENCOUNTER — APPOINTMENT (OUTPATIENT)
Dept: INTERNAL MEDICINE | Facility: CLINIC | Age: 71
End: 2021-11-29

## 2021-12-14 ENCOUNTER — APPOINTMENT (OUTPATIENT)
Dept: PULMONOLOGY | Facility: CLINIC | Age: 71
End: 2021-12-14
Payer: MEDICARE

## 2021-12-14 VITALS
HEIGHT: 62 IN | SYSTOLIC BLOOD PRESSURE: 134 MMHG | WEIGHT: 197 LBS | DIASTOLIC BLOOD PRESSURE: 81 MMHG | BODY MASS INDEX: 36.25 KG/M2 | RESPIRATION RATE: 16 BRPM | TEMPERATURE: 97.8 F | HEART RATE: 74 BPM

## 2021-12-14 PROCEDURE — 99204 OFFICE O/P NEW MOD 45 MIN: CPT

## 2021-12-14 NOTE — REVIEW OF SYSTEMS
[EDS: ESS=____] : daytime somnolence: ESS=[unfilled] [Fatigue] : fatigue [Nasal Congestion] : nasal congestion [Snoring] : snoring [A.M. Dry Mouth] : a.m. dry mouth [A.M. Headache] : headache present upon awakening [Difficulty Initiating Sleep] : difficulty falling asleep [Difficulty Maintaining Sleep] : difficulty maintaining sleep [Negative] : Psychiatric [Chest Pain] : no chest pain [Obesity] : not obese [Anemia] : no anemia [Heartburn] : no heartburn [Nocturia] : no nocturia [Lower Extremity Discomfort] : no lower extremity discomfort [Unusual Sleep Behavior] : no unusual sleep behavior [Cataplexy] :  no cataplexy

## 2021-12-14 NOTE — CONSULT LETTER
[Dear  ___] : Dear  [unfilled], [Consult Letter:] : I had the pleasure of evaluating your patient, [unfilled]. [Please see my note below.] : Please see my note below. [Consult Closing:] : Thank you very much for allowing me to participate in the care of this patient.  If you have any questions, please do not hesitate to contact me. [Sincerely,] : Sincerely, [FreeTextEntry3] : Xochitl Mejia MD

## 2021-12-14 NOTE — ASSESSMENT
[FreeTextEntry1] : 72yo F with difficulty falling asleep as well as staying asleep. She complains of heavy snoring, witnessed apneas, nocturnal gasping, choking, sleep fragmentation. She also has ahistory of hypertension and prediabetes. After menopause she was on HRT. When she discontinued this, her menopausal symptoms became worse. SHe has chronic insomnia and takes trazodone which helps somewhat, especially with falling asleep. However, she continues to wake up frequentlly. She also has morning, headaches, severe nasal congestion and a history of nasal polyps. \par \par Will order HSAT to evaluate for possible ANDRIY\par I explained the rationale for treatment of ANDRIY -- to improve quality of life, daytime function and to decrease the cardiometabolic and other medical risks that are associated with untreated ANDRIY. The patient verbalized understanding.\par I also explained that the patient can expect a follow up call once results of the above study becomes available.\par \par ENT referral provided for chronic nasal congestion

## 2021-12-14 NOTE — HISTORY OF PRESENT ILLNESS
[FreeTextEntry1] : 70yo F with difficulty falling asleep as well as staying asleep. She complains of heavy snoring, witnessed apneas, nocturnal gasping, choking, sleep fragmentation. She also has ahistory of hypertension and prediabetes. After menopause she was on HRT. When she discontinued this, her menopausal symptoms became worse. SHe has chronic insomnia and takes trazodone which helps somewhat, especially with falling asleep. However, she continues to wake up frequentlly. She also has morning, headaches, severe nasal congestion and a history of nasal polyps.

## 2021-12-21 ENCOUNTER — RX RENEWAL (OUTPATIENT)
Age: 71
End: 2021-12-21

## 2022-01-14 ENCOUNTER — NON-APPOINTMENT (OUTPATIENT)
Age: 72
End: 2022-01-14

## 2022-01-14 ENCOUNTER — APPOINTMENT (OUTPATIENT)
Dept: ORTHOPEDIC SURGERY | Facility: CLINIC | Age: 72
End: 2022-01-14
Payer: MEDICARE

## 2022-01-14 VITALS — BODY MASS INDEX: 34.02 KG/M2 | WEIGHT: 192 LBS | HEIGHT: 63 IN

## 2022-01-14 DIAGNOSIS — M25.561 PAIN IN RIGHT KNEE: ICD-10-CM

## 2022-01-14 DIAGNOSIS — M25.562 PAIN IN RIGHT KNEE: ICD-10-CM

## 2022-01-14 PROCEDURE — 99203 OFFICE O/P NEW LOW 30 MIN: CPT

## 2022-01-14 PROCEDURE — 73562 X-RAY EXAM OF KNEE 3: CPT | Mod: 50

## 2022-01-14 NOTE — ADDENDUM
[FreeTextEntry1] : This note was written by Saritha Brown on 01/14/2022 acting as scribe for Dr. Cheng Elaine M.D.\par \par I, Dr. Cheng Elaine, have read and attest that all the information, medical decision making and discharge instructions within are true and accurate.

## 2022-01-14 NOTE — PHYSICAL EXAM
[de-identified] : General appearance: well nourished and hydrated, pleasant, alert and oriented x 3, cooperative.\par HEENT: Normocephalic, EOM intact, Nasal septum midline, Oral cavity clear, External auditory canal clear.\par Cardiovascular: no apparent abnormalities, no lower leg edema, no varicosities, pedal pulses are palpable.\par Lymphatics Lymph nodes: none palpated, Lymphedema: not present.\par Neurologic: sensation is normal, no muscle weakness in upper or lower extremities, patella tendon reflexes intact .\par Dermatologic no apparent skin lesions, moist, warm, no rash.\par Spine:cervical spine appears normal and moves freely, thoracic spine appears normal and moves freely, lumbosacral spine appears normal and moves freely.\par Gait: nonantalgic.\par \par Left knee\par Inspection: no effusion or erythema.\par Wounds: healed midline incision\par Alignment: normal.\par Palpation: no specific tenderness on palpation.\par ROM active (in degrees): 0-115 with crepitus through the arc of motion\par Ligamentous laxity: all ligaments appear stable,, negative ant. drawer test, negative post. drawer test, stable to varus stress test, stable to valgus stress test. negative Lachman's test, negative pivot shift test\par Meniscal Test: negative McMurrays, negative Phoenix.\par Patellofemoral Alignment Test: Q angle-, normal.\par Muscle Test: good quad strength.\par \par Right knee\par Inspection: no effusion or erythema.\par Wounds: healed midline incision\par Alignment: normal.\par Palpation: no specific tenderness on palpation.\par ROM active (in degrees): 0-115 with crepitus throg\par Ligamentous laxity: all ligaments appear stable,, negative ant. drawer test, negative post. drawer test, stable to varus stress test, stable to valgus stress test. negative Lachman's test, negative pivot shift test\par Meniscal Test: negative McMurrays, negative Phoenix.\par Patellofemoral Alignment Test: Q angle-, normal.\par Muscle Test: good quad strength.\par \par Left hip\par Inspection: No swelling or ecchymosis.\par Wounds: none.\par Palpation: non-tender.\par Stability: no instability.\par Strength: 5/5 all motor groups.\par ROM: no pain with FROM.\par Leg length: equal.\par \par Right hip\par Inspection: No swelling or ecchymosis.\par Wounds: none.\par Palpation: non-tender.\par Stability: no instability.\par Strength: 5/5 all motor groups.\par ROM: no pain with FROM.\par Leg length: equal.\par \par Left ankle\par Inspection: no erythema noted, no swelling noted.\par Palpation: no pain on palpation .\par ROM: FROM without crepitus.\par Muscle strength: 5/5.\par Stability: no instability noted.\par \par Right ankle\par Inspection: no erythema noted, no swelling noted.\par ROM: FROM without crepitus.\par Palpation: no pain on palpation .\par Muscle strength: 5/5.\par Stability: no instability noted.\par \par Left foot\par Inspection: color, texture and turgor are normal.\par ROM: full range of motion of all joints without pain or crepitus.\par Palpation: no tenderness.\par Stability: no instability noted.\par \par Right foot\par Inspection: color, texture and turgor are normal.\par ROM: full range of motion of all joints without pain or crepitus.\par Palpation: no tenderness.\par Stability: no instability noted.\par \par Left shoulder\par Inspection: no muscle asymmetry, no atrophy.\par Palpation: no tenderness noted, ACJ non-tender.\par ROM: full active ROM, full passive ROM.\par Strength testing): anterior deltoid, supraspinatus, infraspinatus, subscapularis all 5/5.\par Stability test: ant. apprehension negative, post. apprehension negative, relocation test negative.\par Impingement Test: negative NEER.\par \par Right shoulder\par Inspection: no muscle asymmetry, no atrophy.\par Palpation: no tenderness noted, ACJ non-tender.\par ROM: full active ROM, full passive ROM.\par Strength testing): anterior deltoid, supraspinatus, infraspinatus, subscapularis all 5/5.\par Stability test: ant. apprehension negative, post. apprehension negative, relocation test negative.\par Impingement Test: negative NEER.\par Surgical Wounds: none.\par \par Left elbow\par Inspection: negative swelling.\par Wounds: none.\par Palpation: non-tender.\par ROM: full ROM.\par Strength: 5/5 all groups.\par Stability: no instability.\par Mass: none.\par \par Right elbow\par Inspection: negative swelling.\par Wounds: none.\par Palpation: non-tender.\par ROM: full ROM.\par Strength: 5/5 all groups.\par Stability: no instability.\par Mass: none.\par \par Left wrist\par Inspection: negative swelling.\par Wound: none.\par Palpation (bone): no tenderness.\par ROM: full ROM.\par Strength: full , good.\par \par Right wrist\par Inspection: negative swelling.\par Wound: none.\par Palpation (bone): no tenderness.\par ROM: full ROM.\par Strength: full , good.\par \par Left hand\par Inspection: no skin changes, normal appearance.\par Wounds: none.\par Strength: full , able to make full fist.\par Sensation: light touch intact all fingers and thumb.\par Vascular: good capillary refill < 3 seconds, all fingers and thumb.\par Mass: none.\par \par Right hand\par Inspection: no skin changes, normal appearance.\par Wounds: none.\par Strength: full , able to make full fist.\par Sensation: light touch intact all fingers and thumb.\par Vascular: good capillary refill < 3 seconds, all fingers and thumb.\par Mass: none. [de-identified] : Right knee xray, AP, lateral, merchant view taken at the office today demonstrates a total knee replacement in satisfactory position and alignment. No evidence of loosening. The patella sits in a central position. zone 1 radial lucency 1mm\par \par Left knee xray, AP, lateral, merchant view taken at the office today demonstrates a total knee replacement in satisfactory position and alignment. No evidence of loosening. The patella sits in a central position.\par \par Compared to xray dated 6/26/2013 with no changes.

## 2022-01-14 NOTE — DISCUSSION/SUMMARY
[de-identified] : Pt is s/p bilateral TKR at 10 years. She has developed peripatellar fibrosis. I reassured her that her implants are functioning well. Patient can continue activities as tolerated. All questions answered, understanding verbalized. Patient in agreement with plan of care. I will see her back in 1 year with xrays.

## 2022-01-14 NOTE — HISTORY OF PRESENT ILLNESS
[de-identified] : ISI CRONIN is a 71 year old female who presents for initial evaluation s/p bilateral TKR around 10 years. Pt was doing well up until 3-4 months ago when she developed crepitus with no injury.

## 2022-01-24 ENCOUNTER — APPOINTMENT (OUTPATIENT)
Dept: RHEUMATOLOGY | Facility: CLINIC | Age: 72
End: 2022-01-24
Payer: COMMERCIAL

## 2022-01-24 ENCOUNTER — APPOINTMENT (OUTPATIENT)
Dept: INTERNAL MEDICINE | Facility: CLINIC | Age: 72
End: 2022-01-24
Payer: COMMERCIAL

## 2022-01-24 VITALS
DIASTOLIC BLOOD PRESSURE: 83 MMHG | OXYGEN SATURATION: 95 % | BODY MASS INDEX: 34.73 KG/M2 | HEIGHT: 63 IN | WEIGHT: 196 LBS | SYSTOLIC BLOOD PRESSURE: 145 MMHG | TEMPERATURE: 97.8 F | HEART RATE: 78 BPM

## 2022-01-24 DIAGNOSIS — R73.09 OTHER ABNORMAL GLUCOSE: ICD-10-CM

## 2022-01-24 PROCEDURE — 36415 COLL VENOUS BLD VENIPUNCTURE: CPT

## 2022-01-24 PROCEDURE — 99214 OFFICE O/P EST MOD 30 MIN: CPT | Mod: 25

## 2022-01-24 PROCEDURE — 99441: CPT

## 2022-01-24 RX ORDER — SODIUM PICOSULFATE, MAGNESIUM OXIDE, AND ANHYDROUS CITRIC ACID 10; 3.5; 12 MG/160ML; G/160ML; G/160ML
10-3.5-12 MG-GM LIQUID ORAL
Qty: 1 | Refills: 0 | Status: DISCONTINUED | COMMUNITY
Start: 2021-11-17 | End: 2022-01-24

## 2022-01-24 RX ORDER — IBUPROFEN 600 MG/1
600 TABLET, FILM COATED ORAL
Qty: 20 | Refills: 0 | Status: COMPLETED | COMMUNITY
Start: 2021-09-10

## 2022-01-24 RX ORDER — AMOXICILLIN 500 MG/1
500 TABLET, FILM COATED ORAL
Qty: 15 | Refills: 0 | Status: COMPLETED | COMMUNITY
Start: 2021-09-10

## 2022-01-24 NOTE — PLAN
[FreeTextEntry1] : t o return  in 6 weeks  with execise wt loss and  then recheck bp and recalculate 10 yr cv risk

## 2022-01-24 NOTE — PHYSICAL EXAM
[Normal Sclera/Conjunctiva] : normal sclera/conjunctiva [Normal Outer Ear/Nose] : the outer ears and nose were normal in appearance [No JVD] : no jugular venous distention [No Lymphadenopathy] : no lymphadenopathy [Supple] : supple [No Respiratory Distress] : no respiratory distress  [Clear to Auscultation] : lungs were clear to auscultation bilaterally [Normal Rate] : normal rate  [Regular Rhythm] : with a regular rhythm [Normal S1, S2] : normal S1 and S2 [No Edema] : there was no peripheral edema [Soft] : abdomen soft [Non Tender] : non-tender [No HSM] : no HSM [Normal Bowel Sounds] : normal bowel sounds [Coordination Grossly Intact] : coordination grossly intact [Normal Gait] : normal gait [Normal] : affect was normal and insight and judgment were intact

## 2022-01-24 NOTE — HISTORY OF PRESENT ILLNESS
[FreeTextEntry1] : follow up  for  htn hld  predm obesity  she says she has gained  no real exercise  having a problem  with ongoing sinus probolems and difficulty breathing  she awaits her sleep study  and she still has headaches  she is watching  carbs  and her a1c in 11/21 was better.

## 2022-01-25 ENCOUNTER — RX RENEWAL (OUTPATIENT)
Age: 72
End: 2022-01-25

## 2022-01-25 LAB
24R-OH-CALCIDIOL SERPL-MCNC: 38.9 PG/ML
ALBUMIN SERPL ELPH-MCNC: 4.2 G/DL
ALP BLD-CCNC: 64 U/L
ALT SERPL-CCNC: 21 U/L
ANION GAP SERPL CALC-SCNC: 10 MMOL/L
AST SERPL-CCNC: 21 U/L
BASOPHILS # BLD AUTO: 0.04 K/UL
BASOPHILS NFR BLD AUTO: 1.1 %
BILIRUB SERPL-MCNC: 0.2 MG/DL
BUN SERPL-MCNC: 16 MG/DL
CALCIUM SERPL-MCNC: 10 MG/DL
CHLORIDE SERPL-SCNC: 106 MMOL/L
CHOLEST SERPL-MCNC: 228 MG/DL
CO2 SERPL-SCNC: 30 MMOL/L
CREAT SERPL-MCNC: 0.85 MG/DL
CRP SERPL-MCNC: <3 MG/L
EOSINOPHIL # BLD AUTO: 0.1 K/UL
EOSINOPHIL NFR BLD AUTO: 2.7 %
ERYTHROCYTE [SEDIMENTATION RATE] IN BLOOD BY WESTERGREN METHOD: 23 MM/HR
GLUCOSE SERPL-MCNC: 109 MG/DL
HCT VFR BLD CALC: 40.4 %
HDLC SERPL-MCNC: 90 MG/DL
HGB BLD-MCNC: 12.5 G/DL
IMM GRANULOCYTES NFR BLD AUTO: 0.3 %
LDLC SERPL CALC-MCNC: 95 MG/DL
LYMPHOCYTES # BLD AUTO: 1.31 K/UL
LYMPHOCYTES NFR BLD AUTO: 35.6 %
MAN DIFF?: NORMAL
MCHC RBC-ENTMCNC: 28.3 PG
MCHC RBC-ENTMCNC: 30.9 GM/DL
MCV RBC AUTO: 91.6 FL
MONOCYTES # BLD AUTO: 0.47 K/UL
MONOCYTES NFR BLD AUTO: 12.8 %
NEUTROPHILS # BLD AUTO: 1.75 K/UL
NEUTROPHILS NFR BLD AUTO: 47.5 %
NONHDLC SERPL-MCNC: 138 MG/DL
PLATELET # BLD AUTO: 243 K/UL
POTASSIUM SERPL-SCNC: 4 MMOL/L
PROT SERPL-MCNC: 6.6 G/DL
RBC # BLD: 4.41 M/UL
RBC # FLD: 14.4 %
SODIUM SERPL-SCNC: 146 MMOL/L
THYROGLOB AB SERPL-ACNC: <20 IU/ML
THYROPEROXIDASE AB SERPL IA-ACNC: <10 IU/ML
TRIGL SERPL-MCNC: 211 MG/DL
TSH SERPL-ACNC: 0.44 UIU/ML
WBC # FLD AUTO: 3.68 K/UL

## 2022-01-29 LAB — TOTAL T3-ESOTERIX: 137 NG/DL

## 2022-01-31 LAB — T4 FREE SERPL DIALY-MCNC: 1.2 NG/DL

## 2022-02-01 ENCOUNTER — RX RENEWAL (OUTPATIENT)
Age: 72
End: 2022-02-01

## 2022-02-18 ENCOUNTER — APPOINTMENT (OUTPATIENT)
Dept: ENDOCRINOLOGY | Facility: CLINIC | Age: 72
End: 2022-02-18

## 2022-03-09 ENCOUNTER — OUTPATIENT (OUTPATIENT)
Dept: OUTPATIENT SERVICES | Facility: HOSPITAL | Age: 72
LOS: 1 days | End: 2022-03-09
Payer: MEDICARE

## 2022-03-09 ENCOUNTER — APPOINTMENT (OUTPATIENT)
Dept: RADIOLOGY | Facility: IMAGING CENTER | Age: 72
End: 2022-03-09
Payer: MEDICARE

## 2022-03-09 ENCOUNTER — APPOINTMENT (OUTPATIENT)
Dept: OTOLARYNGOLOGY | Facility: CLINIC | Age: 72
End: 2022-03-09

## 2022-03-09 DIAGNOSIS — Z82.69 FAMILY HISTORY OF OTHER DISEASES OF THE MUSCULOSKELETAL SYSTEM AND CONNECTIVE TISSUE: ICD-10-CM

## 2022-03-09 DIAGNOSIS — G47.10 HYPERSOMNIA, UNSPECIFIED: ICD-10-CM

## 2022-03-09 DIAGNOSIS — Z84.0 FAMILY HISTORY OF DISEASES OF THE SKIN AND SUBCUTANEOUS TISSUE: ICD-10-CM

## 2022-03-09 DIAGNOSIS — Z87.898 PERSONAL HISTORY OF OTHER SPECIFIED CONDITIONS: ICD-10-CM

## 2022-03-09 DIAGNOSIS — Z80.8 FAMILY HISTORY OF MALIGNANT NEOPLASM OF OTHER ORGANS OR SYSTEMS: ICD-10-CM

## 2022-03-09 DIAGNOSIS — Z86.39 PERSONAL HISTORY OF OTHER ENDOCRINE, NUTRITIONAL AND METABOLIC DISEASE: ICD-10-CM

## 2022-03-09 DIAGNOSIS — Z80.3 FAMILY HISTORY OF MALIGNANT NEOPLASM OF BREAST: ICD-10-CM

## 2022-03-09 DIAGNOSIS — Z87.891 PERSONAL HISTORY OF NICOTINE DEPENDENCE: ICD-10-CM

## 2022-03-09 DIAGNOSIS — Z00.8 ENCOUNTER FOR OTHER GENERAL EXAMINATION: ICD-10-CM

## 2022-03-09 DIAGNOSIS — G56.20 LESION OF ULNAR NERVE, UNSPECIFIED UPPER LIMB: ICD-10-CM

## 2022-03-09 DIAGNOSIS — Z63.4 DISAPPEARANCE AND DEATH OF FAMILY MEMBER: ICD-10-CM

## 2022-03-09 DIAGNOSIS — J34.2 DEVIATED NASAL SEPTUM: ICD-10-CM

## 2022-03-09 DIAGNOSIS — G47.33 OBSTRUCTIVE SLEEP APNEA (ADULT) (PEDIATRIC): ICD-10-CM

## 2022-03-09 DIAGNOSIS — Z82.49 FAMILY HISTORY OF ISCHEMIC HEART DISEASE AND OTHER DISEASES OF THE CIRCULATORY SYSTEM: ICD-10-CM

## 2022-03-09 PROCEDURE — 31575 DIAGNOSTIC LARYNGOSCOPY: CPT

## 2022-03-09 PROCEDURE — 99204 OFFICE O/P NEW MOD 45 MIN: CPT | Mod: 25

## 2022-03-09 PROCEDURE — 77080 DXA BONE DENSITY AXIAL: CPT

## 2022-03-09 PROCEDURE — 77080 DXA BONE DENSITY AXIAL: CPT | Mod: 26

## 2022-03-09 RX ORDER — HYDROCODONE BITARTRATE AND ACETAMINOPHEN 5; 325 MG/1; MG/1
5-325 TABLET ORAL
Qty: 90 | Refills: 0 | Status: COMPLETED | COMMUNITY
Start: 2022-01-21 | End: 2022-03-09

## 2022-03-09 RX ORDER — ALBUTEROL SULFATE 90 UG/1
108 (90 BASE) INHALANT RESPIRATORY (INHALATION)
Qty: 18 | Refills: 0 | Status: COMPLETED | COMMUNITY
Start: 2021-09-28 | End: 2022-03-09

## 2022-03-09 RX ORDER — LEFLUNOMIDE 10 MG/1
10 TABLET, FILM COATED ORAL DAILY
Refills: 0 | Status: COMPLETED | COMMUNITY
Start: 2021-11-09 | End: 2022-03-09

## 2022-03-09 RX ORDER — HYDROCODONE BITARTRATE AND ACETAMINOPHEN 10; 325 MG/1; MG/1
10-325 TABLET ORAL
Refills: 0 | Status: COMPLETED | COMMUNITY
Start: 2018-05-01 | End: 2022-03-09

## 2022-03-09 RX ORDER — CHOLECALCIFEROL (VITAMIN D3) 1250 MCG
1.25 MG CAPSULE ORAL
Qty: 12 | Refills: 0 | Status: COMPLETED | COMMUNITY
Start: 2020-03-02 | End: 2022-03-09

## 2022-03-09 SDOH — SOCIAL STABILITY - SOCIAL INSECURITY: DISSAPEARANCE AND DEATH OF FAMILY MEMBER: Z63.4

## 2022-03-22 RX ORDER — EPINEPHRINE 0.3 MG/.3ML
0.3 INJECTION INTRAMUSCULAR
Qty: 1 | Refills: 0 | Status: ACTIVE | COMMUNITY
Start: 2021-01-12 | End: 1900-01-01

## 2022-03-28 ENCOUNTER — APPOINTMENT (OUTPATIENT)
Dept: INTERNAL MEDICINE | Facility: CLINIC | Age: 72
End: 2022-03-28

## 2022-04-12 ENCOUNTER — APPOINTMENT (OUTPATIENT)
Dept: OTOLARYNGOLOGY | Facility: CLINIC | Age: 72
End: 2022-04-12

## 2022-04-13 ENCOUNTER — APPOINTMENT (OUTPATIENT)
Dept: INTERNAL MEDICINE | Facility: CLINIC | Age: 72
End: 2022-04-13

## 2022-04-25 ENCOUNTER — RX RENEWAL (OUTPATIENT)
Age: 72
End: 2022-04-25

## 2022-04-28 ENCOUNTER — APPOINTMENT (OUTPATIENT)
Dept: RHEUMATOLOGY | Facility: CLINIC | Age: 72
End: 2022-04-28
Payer: MEDICARE

## 2022-04-28 ENCOUNTER — APPOINTMENT (OUTPATIENT)
Dept: SLEEP CENTER | Facility: CLINIC | Age: 72
End: 2022-04-28
Payer: MEDICARE

## 2022-04-28 VITALS
HEIGHT: 63 IN | BODY MASS INDEX: 36.04 KG/M2 | OXYGEN SATURATION: 93 % | TEMPERATURE: 95.3 F | WEIGHT: 203.4 LBS | DIASTOLIC BLOOD PRESSURE: 79 MMHG | HEART RATE: 82 BPM | SYSTOLIC BLOOD PRESSURE: 143 MMHG

## 2022-04-28 PROCEDURE — ZZZZZ: CPT

## 2022-04-28 PROCEDURE — 99214 OFFICE O/P EST MOD 30 MIN: CPT

## 2022-04-29 LAB
25(OH)D3 SERPL-MCNC: 23.5 NG/ML
ALBUMIN SERPL ELPH-MCNC: 4.3 G/DL
ALP BLD-CCNC: 62 U/L
ALT SERPL-CCNC: 36 U/L
ANION GAP SERPL CALC-SCNC: 13 MMOL/L
AST SERPL-CCNC: 30 U/L
BASOPHILS # BLD AUTO: 0.05 K/UL
BASOPHILS NFR BLD AUTO: 0.9 %
BILIRUB SERPL-MCNC: 0.2 MG/DL
BUN SERPL-MCNC: 17 MG/DL
CALCIUM SERPL-MCNC: 10.4 MG/DL
CHLORIDE SERPL-SCNC: 103 MMOL/L
CO2 SERPL-SCNC: 28 MMOL/L
CREAT SERPL-MCNC: 0.86 MG/DL
CRP SERPL-MCNC: <3 MG/L
EGFR: 71 ML/MIN/1.73M2
EOSINOPHIL # BLD AUTO: 0.09 K/UL
EOSINOPHIL NFR BLD AUTO: 1.6 %
ERYTHROCYTE [SEDIMENTATION RATE] IN BLOOD BY WESTERGREN METHOD: 59 MM/HR
GLUCOSE SERPL-MCNC: 85 MG/DL
HAV IGM SER QL: NONREACTIVE
HBV CORE IGG+IGM SER QL: NONREACTIVE
HBV CORE IGM SER QL: NONREACTIVE
HBV SURFACE AG SER QL: NONREACTIVE
HCT VFR BLD CALC: 41.9 %
HCV AB SER QL: NONREACTIVE
HCV S/CO RATIO: 0.15 S/CO
HGB BLD-MCNC: 13 G/DL
IMM GRANULOCYTES NFR BLD AUTO: 0.2 %
LYMPHOCYTES # BLD AUTO: 1.7 K/UL
LYMPHOCYTES NFR BLD AUTO: 31.1 %
MAN DIFF?: NORMAL
MCHC RBC-ENTMCNC: 28.1 PG
MCHC RBC-ENTMCNC: 31 GM/DL
MCV RBC AUTO: 90.5 FL
MONOCYTES # BLD AUTO: 0.62 K/UL
MONOCYTES NFR BLD AUTO: 11.3 %
NEUTROPHILS # BLD AUTO: 3 K/UL
NEUTROPHILS NFR BLD AUTO: 54.9 %
PLATELET # BLD AUTO: 281 K/UL
POTASSIUM SERPL-SCNC: 4.2 MMOL/L
PROT SERPL-MCNC: 7 G/DL
RBC # BLD: 4.63 M/UL
RBC # FLD: 14.5 %
SODIUM SERPL-SCNC: 144 MMOL/L
WBC # FLD AUTO: 5.47 K/UL

## 2022-04-30 LAB
M TB IFN-G BLD-IMP: NEGATIVE
QUANTIFERON TB PLUS MITOGEN MINUS NIL: 9.96 IU/ML
QUANTIFERON TB PLUS NIL: 0.04 IU/ML
QUANTIFERON TB PLUS TB1 MINUS NIL: 0.01 IU/ML
QUANTIFERON TB PLUS TB2 MINUS NIL: 0 IU/ML

## 2022-05-05 ENCOUNTER — RX RENEWAL (OUTPATIENT)
Age: 72
End: 2022-05-05

## 2022-05-26 ENCOUNTER — APPOINTMENT (OUTPATIENT)
Dept: CARDIOLOGY | Facility: CLINIC | Age: 72
End: 2022-05-26
Payer: MEDICARE

## 2022-05-26 ENCOUNTER — NON-APPOINTMENT (OUTPATIENT)
Age: 72
End: 2022-05-26

## 2022-05-26 VITALS
HEART RATE: 86 BPM | WEIGHT: 202 LBS | HEIGHT: 63 IN | SYSTOLIC BLOOD PRESSURE: 119 MMHG | DIASTOLIC BLOOD PRESSURE: 73 MMHG | OXYGEN SATURATION: 96 % | TEMPERATURE: 98 F | BODY MASS INDEX: 35.79 KG/M2

## 2022-05-26 VITALS — DIASTOLIC BLOOD PRESSURE: 80 MMHG | SYSTOLIC BLOOD PRESSURE: 136 MMHG

## 2022-05-26 DIAGNOSIS — R00.2 PALPITATIONS: ICD-10-CM

## 2022-05-26 PROCEDURE — 93000 ELECTROCARDIOGRAM COMPLETE: CPT

## 2022-05-26 PROCEDURE — 99214 OFFICE O/P EST MOD 30 MIN: CPT

## 2022-05-26 NOTE — DISCUSSION/SUMMARY
[FreeTextEntry1] : 71F\par \par ALVAREZ: Stable, has not been active. TTE notable for mild DD along with biatrial enlargement. She notes a hx of bilateral carpal tunnel release. Consider amyloid work up. Will send blood work and send for pyrophosphate scan\par \par HTN: OK today, continue meds .\par \par Sinus bradycardia: Improved off BB\par \par PreDM, borderline lipids: Cont to encourage diet, exercise, weight loss\par \par RV 6M\par

## 2022-05-26 NOTE — HISTORY OF PRESENT ILLNESS
[FreeTextEntry1] : 71F with HTN, RA, preDM who presents for cardiac follow up\par \par She has not been exerting herself too much due to back pain, so she has not had any of the dyspnea she endorsed in the past\par Denies CP\par Sporadic lightheadedness\par Beta blocker d/c'ed due to bradycardia in past\par \par Sister passed away from CHF, may have been secondary to chemo. Lost  in April '20 to COVID\par \par Remote smoking hx (quit 40+ years ago). Retired X-ray tech. \par \par ECG: Sinus bradycardia with sinus arrhythmia  \par TTE: 55-60%, mild DD, moderate biatrial enlargement, PASP 37.8 mmHg, hypermobile IAS\par EST 5/2021: DTS 7, no ischemia \par \par Amlodipine 10mg\par Benazepril 10mg\par Triameterine-HCTZ 37-25mg

## 2022-05-31 LAB
DEPRECATED KAPPA LC FREE/LAMBDA SER: 1.8 RATIO
KAPPA LC CSF-MCNC: 1.03 MG/DL
KAPPA LC SERPL-MCNC: 1.85 MG/DL
M PROTEIN SPEC IFE-MCNC: NORMAL

## 2022-06-15 ENCOUNTER — APPOINTMENT (OUTPATIENT)
Dept: SLEEP CENTER | Facility: CLINIC | Age: 72
End: 2022-06-15

## 2022-07-01 NOTE — PROGRESS NOTE ADULT - ASSESSMENT
67 yo F w/t a PMHx of RA on (Simponi, plaquenil, leflunomide), HTN, provoked DVT, R sided thyroid nodules who p/w a 9 day course of pleuritic CP on inspiration, cough, and night sweats with fever and rigors 2/2 infectious vs malignancy vs RA nodules vs PE (least likely given O2 sat). Yes

## 2022-07-05 ENCOUNTER — APPOINTMENT (OUTPATIENT)
Dept: ORTHOPEDIC SURGERY | Facility: CLINIC | Age: 72
End: 2022-07-05

## 2022-07-06 ENCOUNTER — APPOINTMENT (OUTPATIENT)
Dept: ULTRASOUND IMAGING | Facility: CLINIC | Age: 72
End: 2022-07-06

## 2022-07-07 NOTE — CARDIOLOGY SUMMARY
Wound is improving,   Pt to continue current dressings,   Compression,   Will discuss with Dr Noemi Avila possibility of removal of the bone shard from the base of the wound,   Ok to shower ,   No swimming, [de-identified] : 55-60%, mild DD, moderate biatrial enlargement, PASP 37.8 mmHg, hypermobile IAS

## 2022-07-19 ENCOUNTER — APPOINTMENT (OUTPATIENT)
Dept: INTERNAL MEDICINE | Facility: CLINIC | Age: 72
End: 2022-07-19

## 2022-07-19 VITALS
BODY MASS INDEX: 36.14 KG/M2 | HEART RATE: 74 BPM | WEIGHT: 204 LBS | OXYGEN SATURATION: 98 % | HEIGHT: 63 IN | SYSTOLIC BLOOD PRESSURE: 123 MMHG | TEMPERATURE: 97.8 F | DIASTOLIC BLOOD PRESSURE: 77 MMHG

## 2022-07-19 PROCEDURE — 99214 OFFICE O/P EST MOD 30 MIN: CPT

## 2022-07-19 NOTE — HISTORY OF PRESENT ILLNESS
[FreeTextEntry1] : f/u  of med problems  she does a lot of stress   eating but no big change in  weight  exercise has been limited recently.  last a1c 11/21 was ok.  she says she is having some memory loss.  she says her skeletal system hurts a lot and she is to f/u with  her rhumatologist.  remains on  same bp  rx  with no side effects.she remains on trazadone  which she needs nightly

## 2022-07-20 ENCOUNTER — RESULT REVIEW (OUTPATIENT)
Age: 72
End: 2022-07-20

## 2022-07-21 ENCOUNTER — FORM ENCOUNTER (OUTPATIENT)
Age: 72
End: 2022-07-21

## 2022-07-22 ENCOUNTER — OUTPATIENT (OUTPATIENT)
Dept: OUTPATIENT SERVICES | Facility: HOSPITAL | Age: 72
LOS: 1 days | End: 2022-07-22
Payer: MEDICARE

## 2022-07-22 ENCOUNTER — APPOINTMENT (OUTPATIENT)
Dept: SLEEP CENTER | Facility: CLINIC | Age: 72
End: 2022-07-22

## 2022-07-22 PROCEDURE — 95806 SLEEP STUDY UNATT&RESP EFFT: CPT

## 2022-07-22 PROCEDURE — 95806 SLEEP STUDY UNATT&RESP EFFT: CPT | Mod: 26

## 2022-07-25 ENCOUNTER — OUTPATIENT (OUTPATIENT)
Dept: OUTPATIENT SERVICES | Facility: HOSPITAL | Age: 72
LOS: 1 days | End: 2022-07-25
Payer: MEDICARE

## 2022-07-25 ENCOUNTER — APPOINTMENT (OUTPATIENT)
Dept: NUCLEAR MEDICINE | Facility: IMAGING CENTER | Age: 72
End: 2022-07-25

## 2022-07-25 DIAGNOSIS — Z00.8 ENCOUNTER FOR OTHER GENERAL EXAMINATION: ICD-10-CM

## 2022-07-25 DIAGNOSIS — I51.9 HEART DISEASE, UNSPECIFIED: ICD-10-CM

## 2022-07-25 PROCEDURE — 78830 RP LOCLZJ TUM SPECT W/CT 1: CPT | Mod: 26

## 2022-07-25 PROCEDURE — A9538: CPT

## 2022-07-25 PROCEDURE — 78830 RP LOCLZJ TUM SPECT W/CT 1: CPT

## 2022-07-27 ENCOUNTER — OUTPATIENT (OUTPATIENT)
Dept: OUTPATIENT SERVICES | Facility: HOSPITAL | Age: 72
LOS: 1 days | End: 2022-07-27
Payer: MEDICARE

## 2022-07-27 ENCOUNTER — RESULT REVIEW (OUTPATIENT)
Age: 72
End: 2022-07-27

## 2022-07-27 ENCOUNTER — APPOINTMENT (OUTPATIENT)
Dept: ULTRASOUND IMAGING | Facility: CLINIC | Age: 72
End: 2022-07-27

## 2022-07-27 DIAGNOSIS — M06.9 RHEUMATOID ARTHRITIS, UNSPECIFIED: ICD-10-CM

## 2022-07-27 DIAGNOSIS — Z00.8 ENCOUNTER FOR OTHER GENERAL EXAMINATION: ICD-10-CM

## 2022-07-27 PROCEDURE — 76881 US COMPL JOINT R-T W/IMG: CPT | Mod: 26,LT

## 2022-07-27 PROCEDURE — 76881 US COMPL JOINT R-T W/IMG: CPT

## 2022-07-28 ENCOUNTER — NON-APPOINTMENT (OUTPATIENT)
Age: 72
End: 2022-07-28

## 2022-07-29 ENCOUNTER — APPOINTMENT (OUTPATIENT)
Dept: INTERNAL MEDICINE | Facility: AMBULATORY MEDICAL SERVICES | Age: 72
End: 2022-07-29

## 2022-07-31 ENCOUNTER — RX RENEWAL (OUTPATIENT)
Age: 72
End: 2022-07-31

## 2022-08-01 ENCOUNTER — NON-APPOINTMENT (OUTPATIENT)
Age: 72
End: 2022-08-01

## 2022-08-04 ENCOUNTER — RX RENEWAL (OUTPATIENT)
Age: 72
End: 2022-08-04

## 2022-08-08 ENCOUNTER — APPOINTMENT (OUTPATIENT)
Dept: ENDOCRINOLOGY | Facility: CLINIC | Age: 72
End: 2022-08-08

## 2022-08-09 ENCOUNTER — NON-APPOINTMENT (OUTPATIENT)
Age: 72
End: 2022-08-09

## 2022-08-17 PROBLEM — G47.10 HYPERSOMNOLENCE: Status: ACTIVE | Noted: 2021-06-29

## 2022-08-17 NOTE — HISTORY OF PRESENT ILLNESS
[de-identified] : 71 year old female referred by Dr. Xochitl Mejia for evaluation of nasal polyps and difficulty breathing. Patient states she is having difficulty breathing through her nose. Reports chronic nasal congestion, post nasal drip and sinus pain/pressure. Taking Claritin-D daily. No sinus infections within the last year. Using Flonase daily with temporary relief. No recent CT scans of sinuses. Reports snoring with choking/ choking/pausing. Reports use of CPAP 13 years ago. Will be having sleep study 03/15/2022.

## 2022-08-17 NOTE — CONSULT LETTER
[Dear  ___] : Dear  [unfilled], [Consult Letter:] : I had the pleasure of evaluating your patient, [unfilled]. [Please see my note below.] : Please see my note below. [Consult Closing:] : Thank you very much for allowing me to participate in the care of this patient.  If you have any questions, please do not hesitate to contact me. [Sincerely,] : Sincerely, [FreeTextEntry2] : Dr. Xochitl Mejia [FreeTextEntry3] : Truong Henry MD, KYLEIGH, FACS\par  Department Otolaryngology\par Director of [facility]\par Professor of Otolaryngology, \par [employer]

## 2022-08-17 NOTE — PHYSICAL EXAM
[Midline] : trachea located in midline position [Normal] : no rashes [FreeTextEntry1] : obese with ANDRIY daytime hypersomnolence [de-identified] : Class IV soft palate below the base of tongue

## 2022-08-17 NOTE — REASON FOR VISIT
[Initial Consultation] : an initial consultation for [FreeTextEntry2] : Nasal Polyps and difficulty breathing, referred by Dr. Xochitl Mejia

## 2022-08-17 NOTE — PROCEDURE
[Image(s) Captured] : image(s) captured and filed [Video Captured] : video captured and filed [Unable to Cooperate with Mirror] : patient unable to cooperate with mirror [Obstruction] : acute airway obstruction evaluation [Complicated Symptoms] : complicated symptoms requiring more thorough examination than provided by mirror [Topical Lidocaine] : topical lidocaine [Oxymetazoline HCl] : oxymetazoline HCl [Flexible Endoscope] : examined with the flexible endoscope [Serial Number: ___] : Serial Number: [unfilled] [de-identified] : Patient was placed in the examination chair in a sitting position. The nose was decongested with oxymetazoline nasal solution. The airway was anesthetized with 4% Xylocaine.  The back of the throat was anesthetized with Cetacaine. Direct flexible/rigid video endoscopy was performed. Findings revealed:\par Patient is deviated nasal septum significant inferior turbinate hypertrophy positive pneumonia 4 and inspiration larynx vocal cords epiglottis is normal [de-identified] : ANDRIY [FreeTextEntry3] : + Reese

## 2022-09-01 DIAGNOSIS — G47.33 OBSTRUCTIVE SLEEP APNEA (ADULT) (PEDIATRIC): ICD-10-CM

## 2022-09-20 ENCOUNTER — APPOINTMENT (OUTPATIENT)
Dept: PULMONOLOGY | Facility: CLINIC | Age: 72
End: 2022-09-20

## 2022-09-23 ENCOUNTER — APPOINTMENT (OUTPATIENT)
Dept: INTERNAL MEDICINE | Facility: AMBULATORY MEDICAL SERVICES | Age: 72
End: 2022-09-23

## 2022-09-23 PROCEDURE — G0121 COLON CA SCRN NOT HI RSK IND: CPT

## 2022-09-23 PROCEDURE — 43239 EGD BIOPSY SINGLE/MULTIPLE: CPT | Mod: 59

## 2022-10-03 ENCOUNTER — APPOINTMENT (OUTPATIENT)
Dept: ORTHOPEDIC SURGERY | Facility: CLINIC | Age: 72
End: 2022-10-03

## 2022-11-04 ENCOUNTER — APPOINTMENT (OUTPATIENT)
Dept: RHEUMATOLOGY | Facility: CLINIC | Age: 72
End: 2022-11-04

## 2022-11-04 VITALS
WEIGHT: 204 LBS | OXYGEN SATURATION: 95 % | BODY MASS INDEX: 36.14 KG/M2 | SYSTOLIC BLOOD PRESSURE: 132 MMHG | TEMPERATURE: 97.2 F | RESPIRATION RATE: 18 BRPM | HEIGHT: 63 IN | DIASTOLIC BLOOD PRESSURE: 77 MMHG | HEART RATE: 84 BPM

## 2022-11-04 PROCEDURE — 99214 OFFICE O/P EST MOD 30 MIN: CPT

## 2022-11-05 LAB
25(OH)D3 SERPL-MCNC: 30.1 NG/ML
ALBUMIN SERPL ELPH-MCNC: 4.4 G/DL
ALP BLD-CCNC: 58 U/L
ALT SERPL-CCNC: 28 U/L
ANION GAP SERPL CALC-SCNC: 11 MMOL/L
AST SERPL-CCNC: 22 U/L
BASOPHILS # BLD AUTO: 0.04 K/UL
BASOPHILS NFR BLD AUTO: 1 %
BILIRUB SERPL-MCNC: 0.2 MG/DL
BUN SERPL-MCNC: 17 MG/DL
CALCIUM SERPL-MCNC: 9.8 MG/DL
CHLORIDE SERPL-SCNC: 103 MMOL/L
CO2 SERPL-SCNC: 28 MMOL/L
CREAT SERPL-MCNC: 0.83 MG/DL
CRP SERPL-MCNC: <3 MG/L
EGFR: 74 ML/MIN/1.73M2
EOSINOPHIL # BLD AUTO: 0.07 K/UL
EOSINOPHIL NFR BLD AUTO: 1.7 %
ERYTHROCYTE [SEDIMENTATION RATE] IN BLOOD BY WESTERGREN METHOD: 63 MM/HR
GLUCOSE SERPL-MCNC: 105 MG/DL
HCT VFR BLD CALC: 39.6 %
HGB BLD-MCNC: 12.4 G/DL
IMM GRANULOCYTES NFR BLD AUTO: 0.2 %
LYMPHOCYTES # BLD AUTO: 1.27 K/UL
LYMPHOCYTES NFR BLD AUTO: 31.3 %
MAN DIFF?: NORMAL
MCHC RBC-ENTMCNC: 28.3 PG
MCHC RBC-ENTMCNC: 31.3 GM/DL
MCV RBC AUTO: 90.4 FL
MONOCYTES # BLD AUTO: 0.54 K/UL
MONOCYTES NFR BLD AUTO: 13.3 %
NEUTROPHILS # BLD AUTO: 2.13 K/UL
NEUTROPHILS NFR BLD AUTO: 52.5 %
PLATELET # BLD AUTO: 259 K/UL
POTASSIUM SERPL-SCNC: 4.1 MMOL/L
PROT SERPL-MCNC: 7.2 G/DL
RBC # BLD: 4.38 M/UL
RBC # FLD: 14.6 %
SODIUM SERPL-SCNC: 143 MMOL/L
WBC # FLD AUTO: 4.06 K/UL

## 2022-11-17 ENCOUNTER — APPOINTMENT (OUTPATIENT)
Dept: CARDIOLOGY | Facility: CLINIC | Age: 72
End: 2022-11-17

## 2022-11-17 VITALS
TEMPERATURE: 98.8 F | WEIGHT: 211.9 LBS | RESPIRATION RATE: 18 BRPM | BODY MASS INDEX: 37.55 KG/M2 | HEIGHT: 63 IN | HEART RATE: 82 BPM | DIASTOLIC BLOOD PRESSURE: 90 MMHG | OXYGEN SATURATION: 96 % | SYSTOLIC BLOOD PRESSURE: 130 MMHG

## 2022-11-17 VITALS — DIASTOLIC BLOOD PRESSURE: 80 MMHG | SYSTOLIC BLOOD PRESSURE: 146 MMHG

## 2022-11-17 PROCEDURE — 99213 OFFICE O/P EST LOW 20 MIN: CPT

## 2022-11-17 NOTE — HISTORY OF PRESENT ILLNESS
[FreeTextEntry1] : 72F with HTN, RA, preDM, mild DD who presents for cardiac follow up\par \par Pt notes that her dyspnea has improved since now being treated for ANDRIY with nasal CPAP\par Also sleeping better\par Denies CP, lightheadedness \par Beta blocker d/c'ed due to bradycardia in past\par Negative amyloid scan (DD, atrial enlargement, bilateral carpal tunnel) \par \par Sister passed away from CHF, may have been secondary to chemo. Lost  in April '20 to COVID\par \par Remote smoking hx (quit 40+ years ago). Retired X-ray tech. \par \par ECG: Sinus bradycardia with sinus arrhythmia  \par TTE: 55-60%, mild DD, moderate biatrial enlargement, PASP 37.8 mmHg, hypermobile IAS\par EST 5/2021: DTS 7, no ischemia \par \par Amlodipine 10mg\par Benazepril 10mg\par Triameterine-HCTZ 37-25mg

## 2022-11-17 NOTE — DISCUSSION/SUMMARY
[FreeTextEntry1] : 72F\par \par ALVAREZ: Improving now with ANDRIY treatment. Cont treatment, BP control\par \par HTN: Borderline today, cont meds\par \par Sinus bradycardia: Improved off BB\par \par Mild DD: Repeat echo, stable symptoms. Negative amyloid scan. \par \par RV 6M\par TTE

## 2022-11-23 ENCOUNTER — RX RENEWAL (OUTPATIENT)
Age: 72
End: 2022-11-23

## 2022-11-23 ENCOUNTER — NON-APPOINTMENT (OUTPATIENT)
Age: 72
End: 2022-11-23

## 2022-11-28 ENCOUNTER — RX RENEWAL (OUTPATIENT)
Age: 72
End: 2022-11-28

## 2022-12-06 ENCOUNTER — APPOINTMENT (OUTPATIENT)
Dept: ENDOCRINOLOGY | Facility: CLINIC | Age: 72
End: 2022-12-06

## 2022-12-06 VITALS
SYSTOLIC BLOOD PRESSURE: 128 MMHG | BODY MASS INDEX: 37.54 KG/M2 | DIASTOLIC BLOOD PRESSURE: 92 MMHG | HEART RATE: 96 BPM | WEIGHT: 211.9 LBS | OXYGEN SATURATION: 96 %

## 2022-12-06 DIAGNOSIS — Z00.00 ENCOUNTER FOR GENERAL ADULT MEDICAL EXAMINATION W/OUT ABNORMAL FINDINGS: ICD-10-CM

## 2022-12-06 PROCEDURE — 99215 OFFICE O/P EST HI 40 MIN: CPT

## 2022-12-16 ENCOUNTER — OUTPATIENT (OUTPATIENT)
Dept: OUTPATIENT SERVICES | Facility: HOSPITAL | Age: 72
LOS: 1 days | End: 2022-12-16
Payer: MEDICARE

## 2022-12-16 ENCOUNTER — APPOINTMENT (OUTPATIENT)
Dept: ULTRASOUND IMAGING | Facility: CLINIC | Age: 72
End: 2022-12-16

## 2022-12-16 DIAGNOSIS — Z00.8 ENCOUNTER FOR OTHER GENERAL EXAMINATION: ICD-10-CM

## 2022-12-16 DIAGNOSIS — E04.1 NONTOXIC SINGLE THYROID NODULE: ICD-10-CM

## 2022-12-16 PROCEDURE — 76536 US EXAM OF HEAD AND NECK: CPT

## 2022-12-16 PROCEDURE — 76536 US EXAM OF HEAD AND NECK: CPT | Mod: 26

## 2023-01-03 ENCOUNTER — APPOINTMENT (OUTPATIENT)
Dept: INTERNAL MEDICINE | Facility: CLINIC | Age: 73
End: 2023-01-03
Payer: MEDICARE

## 2023-01-03 PROCEDURE — 93306 TTE W/DOPPLER COMPLETE: CPT

## 2023-01-04 ENCOUNTER — APPOINTMENT (OUTPATIENT)
Dept: ENDOCRINOLOGY | Facility: CLINIC | Age: 73
End: 2023-01-04

## 2023-01-04 VITALS
OXYGEN SATURATION: 96 % | DIASTOLIC BLOOD PRESSURE: 80 MMHG | HEIGHT: 63 IN | BODY MASS INDEX: 37.74 KG/M2 | WEIGHT: 213 LBS | SYSTOLIC BLOOD PRESSURE: 142 MMHG | HEART RATE: 76 BPM

## 2023-01-11 ENCOUNTER — APPOINTMENT (OUTPATIENT)
Dept: ENDOCRINOLOGY | Facility: CLINIC | Age: 73
End: 2023-01-11

## 2023-01-23 ENCOUNTER — APPOINTMENT (OUTPATIENT)
Dept: ORTHOPEDIC SURGERY | Facility: CLINIC | Age: 73
End: 2023-01-23
Payer: MEDICARE

## 2023-01-23 VITALS — HEIGHT: 63 IN | BODY MASS INDEX: 37.74 KG/M2 | WEIGHT: 213 LBS

## 2023-01-23 PROCEDURE — 72040 X-RAY EXAM NECK SPINE 2-3 VW: CPT

## 2023-01-23 PROCEDURE — 99204 OFFICE O/P NEW MOD 45 MIN: CPT

## 2023-01-26 ENCOUNTER — APPOINTMENT (OUTPATIENT)
Dept: ENDOCRINOLOGY | Facility: CLINIC | Age: 73
End: 2023-01-26
Payer: MEDICARE

## 2023-01-26 VITALS
WEIGHT: 213 LBS | SYSTOLIC BLOOD PRESSURE: 150 MMHG | HEART RATE: 93 BPM | DIASTOLIC BLOOD PRESSURE: 80 MMHG | HEIGHT: 63 IN | BODY MASS INDEX: 37.74 KG/M2 | OXYGEN SATURATION: 95 %

## 2023-01-26 PROCEDURE — 10005 FNA BX W/US GDN 1ST LES: CPT

## 2023-01-26 PROCEDURE — 99213 OFFICE O/P EST LOW 20 MIN: CPT | Mod: 25

## 2023-01-26 NOTE — ASSESSMENT
SUBJECTIVE:  Ag Perez, 79 year old, male presents with the following Chief Complaint(s) with HPI to follow:   Chief Complaint   Patient presents with     WOUND CARE          HPI:  Ag presented to wound care today for a dressing change.  He states he went to Urgent Care as he has been doing the last few days and they told him to go to wound care.  He and his daughter-in-law are a little upset by this, but are happy that we will accommodate him with the dressing change.      At the visit on 7/5/19 it was noted there were some issues with the wound vac related to the skin being macerated and red with scattered dermatitis lesions because the black foam had been placed directly on the skin.  Dr. Rodriges was consulted and recommended packing the wound with saline dampened kerlix, covered with dry gauze and an ABD pad; changing BID.  Unfortunately, his family is unable to help him with the dressing changes therefore home care will do one dressing change in the morning and he has to come to [facility] for the second dressing change of the day.  He is scheduled to see Dr. Vilchis on 7/10/19 and we will ask the surgery nurses to perform the dressing change in the afternoons going forward.      Back story per Marissa Garg NP 7/5/19 note:  3/12/19: Ag had a left transverse hemicolectomy at [facility] for invasive moderate to focally poorly differentiated colonic adenocarcinoma refused to get chemotherapy.    5/30/19: presented to the ED with acute onset of abdominal pain.  He was going to the bathroom when he felt like a pop and developed abdominal pain.   Hospitalized from 5/30/19 to 6/11/19 for small bowel perforation, acute metabolic encephalopathy, metabolic and respiratory acidosis, severe sepsis with septic shock, acute kidney injury.  Lactic acid, up to 6. Procalcitonin, up to 37.13.  Concerns with incarcerated incisional hernia  5/31/19: surgery by Dr. Vilchis.  Post-op diagnosis: fascial dehiscense with small  [FreeTextEntry1] : 72-year-old female here for follow-up of thyroid nodules.  Increase in isthmus nodule causing compressive symptoms at this time.  FNA has been performed today.\par \par – FNA has been performed and previous FNA in January 2020 was benign\par – If patient has 2 FNAs that have been benign this patient can be considered for radiofrequency ablation\par – This was discussed with the patient and she was potentially interested in the procedure.\par – We will follow-up on results with the patient of the biopsy bowel perforation; Procedure:   EXPLORATORY LAPAROTOMY WITH RESECTION OF SMALL BOWEL, JEJUNUM, TEMPORARY ABDOMINAL WALL CLOSURE, SEROSAL REPAIR  5/31/19: Central line placement  6/11/19: Transferred for short term care at [facility]. Oma DOWNEY RN CWOCN consulted the patient and per her note--[facility] facility will not be able to get a wound vac in house until possibly tomorrow so will have to initiate BID NS moist gauze dressings until then.  Ag isn't sure what day when the NPWT was placed  6/27/19: telephone encounter that patient is still at [facility] with a NPWT.  Saw Dr. Vilchis this day, who reports that patient is in the process of being discharge from the facility tomorrow.    6/28/19: telephone call to Wound Care.  Ag to be discharge on Monday with [facility] services.    7/5/19: 1st wound care appt with Marissa Garg NP        Patient Active Problem List   Diagnosis     Advance care planning     SBO (small bowel obstruction) (H)     Sepsis (H)     Acute abdominal pain     Chronic atrial fibrillation (H)     Malignant neoplasm of transverse colon (H)     Small bowel perforation (H)     Cardiac pacemaker in situ       Past Medical History:   Diagnosis Date     Arrhythmia      Hypertension      Pacemaker        Past Surgical History:   Procedure Laterality Date     CARDIAC SURGERY       COLONOSCOPY       COLONOSCOPY N/A 7/24/2015    Procedure: COLONOSCOPY;  Surgeon: Kameron De Santiago MD;  Location: [facility] OR     LAPAROTOMY EXPLORATORY N/A 5/31/2019    Procedure: EXPLORATORY LAPAROTOMY WITH RESECTION OF SMALL BOWEL, JEJUNUM, TEMPORARY ABDOMINAL WALL CLOSURE, SEROSAL REPAIR;  Surgeon: Pastor Vilchis MD;  Location: [facility] OR     LAPAROTOMY EXPLORATORY N/A 6/2/2019    Procedure: EXPLORATORY LAPAROTOMY, SMALL BOWEL RESECTION-JEJUNUM, SMALL BOWEL ANASTOMOSIS, ABDOMINAL WASH OUT, FASCIAL CLOSURE;  Surgeon: Pastor Vilchis MD;  Location: [facility] OR       History reviewed. No pertinent family  history.    Social History     Tobacco Use     Smoking status: Former Smoker     Smokeless tobacco: Never Used   Substance Use Topics     Alcohol use: Not on file       Current Outpatient Medications   Medication Sig Dispense Refill     ASPIRIN PO Take 81 mg by mouth daily       atorvastatin (LIPITOR) 10 MG tablet Take 10 mg by mouth every evening       BENAZEPRIL HCL PO Take 40 mg by mouth daily       Ezetimibe (ZETIA PO) Take 10 mg by mouth At Bedtime       ferrous sulfate (FEROSUL) 325 (65 Fe) MG tablet Take 325 mg by mouth daily (with dinner)       LEVOTHYROXINE SODIUM PO Take 125 mcg by mouth daily        Multiple Vitamins-Minerals (THERAPEUTIC M) TABS Take 1 tablet by mouth daily       Nitroglycerin (NITROSTAT SL) Place 0.4 mg under the tongue every 5 minutes as needed for chest pain       Omega-3 Fatty Acids (OMEGA-3 FISH OIL PO) Take 1 g by mouth 2 times daily (with meals)        oxyCODONE (ROXICODONE) 5 MG tablet Take 0.5 tablets (2.5 mg) by mouth every 4 hours as needed for moderate to severe pain 30 tablet 0     QUEtiapine (SEROQUEL) 25 MG tablet Take 1 tablet (25 mg) by mouth daily       QUEtiapine (SEROQUEL) 50 MG tablet Take 1 tablet (50 mg) by mouth every evening       tamsulosin (FLOMAX) 0.4 MG capsule Take 0.4 mg by mouth every evening         Allergies   Allergen Reactions     Crestor [Rosuvastatin]        REVIEW OF SYSTEMS  Skin: as above  Eyes: negative  Ears/Nose/Throat: negative  Respiratory: No shortness of breath, dyspnea on exertion, cough, or hemoptysis  Cardiovascular: positive for irregular heart beat  Gastrointestinal: as above  Genitourinary: negative  Musculoskeletal: negative  Neurologic: forgetful and a little confused related to his medical care  Psychiatric: negative  Hematologic/Lymphatic/Immunologic: negative  Endocrine: positive for prediabetes    OBJECTIVE:    B/P: 136/58, T: 97.8, P: 92, R: 16, W: 0 lbs 0 oz, BMI: There is no height or weight on file to calculate  BMI.  Constitutional: alert and no distress  Head: Normocephalic. No masses, lesions, tenderness or abnormalities  Cardiovascular: Irregular rhythm, regular rate. No murmurs, clicks gallops or rub  Respiratory:  Good diaphragmatic excursion. Lungs clear  Gastrointestinal: Abdomen soft, non-tender. BS normal. No masses, organomegaly  : Deferred  Skin:        Wound description:     Type of Wound:  Surgical   Location:  Midline abdomen    Drainage amount:  moderate   Drainage color:  Creamy with green hue   Odor:  yes   Wound bed:  See picture   Surrounding skin:  WDL        Measurements:      Overall - 17.5 x 4.3 x 4.5 cm    Undermining [id number] - 4.4 cm    0300 tunnel - 4.2 cm   Pain:  denies       Dressing change:      Cleaned around wound with mild soap, rinsed, dried.  Wound base cleansed with gauze dampened with acetic acid.  Dressed with acetic acid dampened kerlix, covered with dry gauze and 8x10 ABD pad, secured with medipore tape.    Psychiatric: affect normal/bright and confusion noted    THERAPY GOAL:    Moisture balance    ASSESSMENT / PLAN:  Comments:  The drainage has a bit of a green tinge and there is some odor so we will try acetic acid with this dressing change then go back to the normal saline with the morning dressing.    Plan:  BID dressing changes with normal saline dampened rolled gauze to wound base, tunnel, and undermining.  Cover with dry gauze and 8x10 ABD pad, secure with Medipore tape.    Follow-up 7/9/19 for dressing change, then weekly or as needed for acute concerns.    Sylvie Estrada CNS  Surgery and Wound Care  [facility]

## 2023-01-26 NOTE — HISTORY OF PRESENT ILLNESS
[FreeTextEntry1] : 72-year-old female with multiple thyroid nodules here for follow-up\par \par Patient recently called Dr. Abbott reporting that she has been having increased pressure in the neck, raspy voice, dysphagia.  Subsequently an ultrasound was ordered and it was noted that she had an increase in the size of her isthmus nodule which has primarily been causing the issues.\par TFTs have been within normal limits\par – No family history of thyroid cancer\par – This nodule in the isthmus previously was benign in January 2020

## 2023-01-26 NOTE — REVIEW OF SYSTEMS
[Fatigue] : no fatigue [Recent Weight Gain (___ Lbs)] : no recent weight gain [Decreased Appetite] : appetite not decreased [Recent Weight Loss (___ Lbs)] : no recent weight loss [Visual Field Defect] : no visual field defect [Dry Eyes] : no dryness [Dysphagia] : no dysphagia [Neck Pain] : no neck pain [Dysphonia] : no dysphonia [Nasal Congestion] : no nasal congestion [Chest Pain] : no chest pain [Slow Heart Rate] : heart rate is not slow [Palpitations] : no palpitations [Fast Heart Rate] : heart rate is not fast [Shortness Of Breath] : no shortness of breath [Cough] : no cough [Nausea] : no nausea [Constipation] : no constipation [Vomiting] : no vomiting [Diarrhea] : no diarrhea [Polyuria] : no polyuria [Irregular Menses] : regular menses [Joint Pain] : no joint pain [Muscle Weakness] : no muscle weakness [Acanthosis] : no acanthosis  [Acne] : no acne [Headaches] : no headaches [Dizziness] : no dizziness [Tremors] : no tremors [Pain/Numbness of Digits] : no pain/numbness of digits [Depression] : no depression [Polydipsia] : no polydipsia [Cold Intolerance] : no cold intolerance [Easy Bleeding] : no ~M tendency for easy bleeding [Easy Bruising] : no tendency for easy bruising

## 2023-01-26 NOTE — PROCEDURE
[Fine Needle Aspiration] : Fine needle aspiration ~T ~C was performed. [Area of Mass: ______] : mass identified in the [unfilled] [Risks] : risks [Patient] : the patient [Benefits] : benefits [Lidocaine Cream] : lidocaine cream [1%] : 1% [Supine] : The patient was placed in the supine position with the neck extended as tolerated. [Alcohol] : with alcohol [25 gauge 1.5 inch] : A 25 gauge 1.5 inch needle was used [2 Passes] : 2 passes were made through the mass [Ultrasonic Guidance] : ultrasound guidance was employed [Sent to Histology] : The specimens were prepared in the usual manner and sent to histology. [Afirma] : Afirma [Tolerated Well] : the patient tolerated the procedure well [Vital Signs Stable] : the vital signs were stable [FreeTextEntry1] : 72-year-old female with increasing 2.8 cm isthmus nodule now status post FNA\par \par – Saved for Afirma\par – We will be called back with results

## 2023-01-26 NOTE — PHYSICAL EXAM
[Alert] : alert [Well Nourished] : well nourished [No Acute Distress] : no acute distress [Normal Sclera/Conjunctiva] : normal sclera/conjunctiva [PERRL] : pupils equal, round and reactive to light [Normal Outer Ear/Nose] : the ears and nose were normal in appearance [Normal TMs] : both tympanic membranes were normal [No Neck Mass] : no neck mass was observed [Thyroid Not Enlarged] : the thyroid was not enlarged [No Respiratory Distress] : no respiratory distress [Clear to Auscultation] : lungs were clear to auscultation bilaterally [Normal S1, S2] : normal S1 and S2 [Normal Rate] : heart rate was normal [Regular Rhythm] : with a regular rhythm [Normal Bowel Sounds] : normal bowel sounds [Not Tender] : non-tender [Soft] : abdomen soft [Normal Gait] : normal gait [No Clubbing, Cyanosis] : no clubbing  or cyanosis of the fingernails [No Joint Swelling] : no joint swelling seen [Normal Strength/Tone] : muscle strength and tone were normal [No Rash] : no rash [No Skin Lesions] : no skin lesions [No Motor Deficits] : the motor exam was normal [No Tremors] : no tremors [Oriented x3] : oriented to person, place, and time [Normal Affect] : the affect was normal [Normal Insight/Judgement] : insight and judgment were intact [Normal Mood] : the mood was normal

## 2023-01-26 NOTE — ASSESSMENT
[FreeTextEntry1] : 72-year-old female here for follow-up of thyroid nodules.  Increase in isthmus nodule causing compressive symptoms at this time.  FNA has been performed today.\par \par – FNA has been performed and previous FNA in January 2020 was benign\par – If patient has 2 FNAs that have been benign this patient can be considered for radiofrequency ablation\par – This was discussed with the patient and she was potentially interested in the procedure.\par – We will follow-up on results with the patient of the biopsy

## 2023-01-27 ENCOUNTER — TRANSCRIPTION ENCOUNTER (OUTPATIENT)
Age: 73
End: 2023-01-27

## 2023-01-31 LAB — FNA, THYROID: NORMAL

## 2023-02-02 ENCOUNTER — APPOINTMENT (OUTPATIENT)
Dept: SPINE | Facility: CLINIC | Age: 73
End: 2023-02-02
Payer: MEDICARE

## 2023-02-02 ENCOUNTER — NON-APPOINTMENT (OUTPATIENT)
Age: 73
End: 2023-02-02

## 2023-02-02 VITALS
HEIGHT: 63 IN | HEART RATE: 74 BPM | SYSTOLIC BLOOD PRESSURE: 151 MMHG | OXYGEN SATURATION: 94 % | WEIGHT: 213 LBS | DIASTOLIC BLOOD PRESSURE: 86 MMHG | BODY MASS INDEX: 37.74 KG/M2

## 2023-02-02 DIAGNOSIS — R20.2 PARESTHESIA OF SKIN: ICD-10-CM

## 2023-02-02 DIAGNOSIS — M54.9 DORSALGIA, UNSPECIFIED: ICD-10-CM

## 2023-02-02 DIAGNOSIS — M54.2 CERVICALGIA: ICD-10-CM

## 2023-02-02 PROCEDURE — 99203 OFFICE O/P NEW LOW 30 MIN: CPT

## 2023-02-03 NOTE — REASON FOR VISIT
[New Patient Visit] : a new patient visit [Other: _____] : [unfilled] [FreeTextEntry1] : neck pain and low back pain

## 2023-02-03 NOTE — HISTORY OF PRESENT ILLNESS
[> 3 months] : more  than 3 months [FreeTextEntry1] : Neck and low back pain  [de-identified] : Ms. Gresham is a 72 year old female with PMH of HTN, GERD, and RA here today for a neurosurgical consultation.The patient reports a 20 year history of low back pain which has progressively gotten worse. She states the pain radiates into bilateral thighs and she also has numbness in the soles of her feet. The patient finds herself constantly leaning forward which helps to reduce her pain. Denies any trauma or injury. In addition the patient complains of neck pain which radiates down into her hands. She also reports trouble with her balance, numbness and tingling in hands and  bilateral  weakness. She states the neck pain begun June 2022 after doing physical therapy for her back pain. Pain level is 4/10. She is under the care of pain management Dr. Albino Medrano and has received multiple lumbar epidural injections with temporary relief. Last epidural was 2/2/2023. She takes Hydrocodone 5 mg daily 4 times a day. Denies any bladder or bowel dysfunction. A lumbar MRI scan shows multilevel foraminal stenosis without significant central stenosis.

## 2023-02-03 NOTE — PHYSICAL EXAM
[General Appearance - Alert] : alert [General Appearance - In No Acute Distress] : in no acute distress [Oriented To Time, Place, And Person] : oriented to person, place, and time [Person] : oriented to person [Place] : oriented to place [Time] : oriented to time [Short Term Intact] : short term memory intact [Remote Intact] : remote memory intact [Registration Intact] : recent registration memory intact [Span Intact] : the attention span was normal [Concentration Intact] : normal concentrating ability [Visual Intact] : visual attention was ~T not ~L decreased [Fluency] : fluency intact [Comprehension] : comprehension intact [Reading] : reading intact [Current Events] : adequate knowledge of current events [Past History] : adequate knowledge of personal past history [Vocabulary] : adequate range of vocabulary [Cranial Nerves Optic (II)] : visual acuity intact bilaterally,  pupils equal round and reactive to light [Cranial Nerves Oculomotor (III)] : extraocular motion intact [Cranial Nerves Trigeminal (V)] : facial sensation intact symmetrically [Cranial Nerves Facial (VII)] : face symmetrical [Cranial Nerves Vestibulocochlear (VIII)] : hearing was intact bilaterally [Cranial Nerves Glossopharyngeal (IX)] : tongue and palate midline [Cranial Nerves Hypoglossal (XII)] : there was no tongue deviation with protrusion [Cranial Nerves Accessory (XI - Cranial And Spinal)] : head turning and shoulder shrug symmetric [Motor Tone] : muscle tone was normal in all four extremities [Motor Strength] : muscle strength was normal in all four extremities [No Muscle Atrophy] : normal bulk in all four extremities [Sensation Tactile Decrease] : light touch was intact [Abnormal Walk] : normal gait [Balance] : balance was intact [2+] : Patella left 2+ [Past-pointing] : there was no past-pointing [Tremor] : no tremor present [FreeTextEntry6] : bilateral  and proximal lower extremity weakness. 4/5

## 2023-02-03 NOTE — ASSESSMENT
[FreeTextEntry1] : 72 year old female with neck and back pain. Bilateral  and proximal lower extremity weakness on examination. \par MRI of cervical spine ordered to assess for disc herniation or stenosis. Lumbar flex/ext x-rays ordered to assess for instability of spine. EMG of upper and lower extremities to rule out peripheral neuropathy. Standing scoliosis x-ray was ordered. The patient will return after obtaining images.  I evaluated and examined this patient along with the nurse practitioner and we agreed on a plan of care.

## 2023-02-07 ENCOUNTER — APPOINTMENT (OUTPATIENT)
Dept: PHYSICAL MEDICINE AND REHAB | Facility: CLINIC | Age: 73
End: 2023-02-07
Payer: MEDICARE

## 2023-02-07 VITALS
SYSTOLIC BLOOD PRESSURE: 144 MMHG | RESPIRATION RATE: 18 BRPM | BODY MASS INDEX: 38.09 KG/M2 | WEIGHT: 215 LBS | OXYGEN SATURATION: 94 % | HEART RATE: 76 BPM | DIASTOLIC BLOOD PRESSURE: 88 MMHG | TEMPERATURE: 97.3 F | HEIGHT: 63 IN

## 2023-02-07 DIAGNOSIS — G62.9 POLYNEUROPATHY, UNSPECIFIED: ICD-10-CM

## 2023-02-07 DIAGNOSIS — M54.16 RADICULOPATHY, LUMBAR REGION: ICD-10-CM

## 2023-02-07 PROCEDURE — 99203 OFFICE O/P NEW LOW 30 MIN: CPT

## 2023-02-09 ENCOUNTER — NON-APPOINTMENT (OUTPATIENT)
Age: 73
End: 2023-02-09

## 2023-02-09 PROBLEM — M54.16 LUMBAR RADICULOPATHY: Status: ACTIVE | Noted: 2023-02-07

## 2023-02-09 NOTE — PHYSICAL EXAM
[FreeTextEntry1] : 0/4\par + spurlings\par hand arthritis\par sensation instact\par numbness in the feet/ hand\par + Tinels\par \par Pleasant, in no distress. Language: English\par HEENT: Head: no trauma. Eyes: no discharge. Ears: No discharge. Nose No discharge. Throat: clear\par Neck: FAROM.  Positive Spurling's.\par Heart: RR, +S1, S2\par Lungs: CTA\par Abdomen: soft, NT\par Lumbar spine: FAROM, no spasm\par \par LUE: Shoulder:FAROM, MS 5-/5\par Elbow: FAROM, MS 5-/5 reflexes 2/4\par Wrist: FAROM, MS 5-/5 reflexes 2/4\par Warm, nontender, pulse 2+ positive Tinel's\par \par Shoulder:FAROM, MS 5-/5\par Elbow: FAROM, MS 5-/5 reflexes 2/4\par Wrist: FAROM, MS 5-/5 reflexes 2/4\par Warm, nontender, pulse 2+ positive Tinel's\par \par LLE: Hip: FAROM, MS 5-/5\par Knee: FAROM, MS 5-/5 reflexes 2/4\par Ankle: FAROM, MS 5-/5 reflexes 2/4\par Warm ,  pulse 2+ negative homans.  \par \par RLE: Hip: FAROM, MS 5-/5\par Knee: FAROM, MS 5-/5 reflexes 2/4\par Ankle: FAROM, MS 5-/5 reflexes 2/4\par Warm , nontender, pulse 2+ negative homans\par \par Gait: Spontaneous, reciprocal, safe without an assistive device\par \par Sensation\par RUE: sensation is decreased to light touch, pinprick  and proprioception\par LUE: sensation is idecreased to light touch, pinprick  and proprioception\par RLE: sensation is decreased to light touch, pinprick  and proprioception. pos SLR. Neg BELEN, Neg FADIR\par LLE: sensation is decreased to light touch, pinprick  and proprioception. pos SLR. Neg BELEN, Neg FADIR\par \par

## 2023-02-09 NOTE — DATA REVIEWED
[MRI] : MRI [FreeTextEntry1] : MRI of the lumbar spine performed on June 16, 2022 reveals\par L1/2 through L3/4 disc bulge with facet hypertrophy with moderate to severe bilateral neuroforaminal stenosis\par L4/5 grade 1 anterolisthesis of L4 on 5 with mild canal and moderate bilateral neuroforaminal stenosis\par L5/S1 disc bulge.  Facet hypertrophy with moderate/severe left and mild right neuroforaminal stenosis.  Impingement of the exiting left L5 root

## 2023-02-09 NOTE — HISTORY OF PRESENT ILLNESS
[FreeTextEntry1] : Referred by Dr. Mark Rodriguez\par \par 72 year old female presents neck and low back pain\par \par Low back pain\par Pain:  6/10 Worse: 10/10 Quality: sharp  Frequency: constant\par The pain starts in various places in the hips to low back to thoracic spine\par The pain pain can radiate up toward the shoulders and down the legs to the knees.\par The pain is worse with sit or stand with 10 minute\par rarely with knee buckling\par No b/b\par \par RADHA Dr Caro mehta recently with 4 days of relief\par She uses hydrocodone 5/325 mg po 3-4 x days prescribed by Dr. Medrano\par \par \par cervical radiculopathy\par Pain:  8/10 Worse: 10/10 Quality: dullache  Frequency: constant\par The pain starts in the neck and radiates down both arm to the hands with numbness\par Pain is aggravated with aggravated with turning with neck\par She has dropped objects\par \par She has a past medical/surgical history of \par BTKR\par Bilateral carpal tunnel release\par Bilateral ulnar nerve surgery\par RA\par No DM. NO ETOH. No thyroid disease\par \par MRI pending of the cervical spine\par

## 2023-02-10 ENCOUNTER — APPOINTMENT (OUTPATIENT)
Dept: RHEUMATOLOGY | Facility: CLINIC | Age: 73
End: 2023-02-10

## 2023-02-10 ENCOUNTER — APPOINTMENT (OUTPATIENT)
Dept: PULMONOLOGY | Facility: CLINIC | Age: 73
End: 2023-02-10

## 2023-02-15 ENCOUNTER — OUTPATIENT (OUTPATIENT)
Dept: OUTPATIENT SERVICES | Facility: HOSPITAL | Age: 73
LOS: 1 days | End: 2023-02-15
Payer: MEDICARE

## 2023-02-15 ENCOUNTER — APPOINTMENT (OUTPATIENT)
Dept: RADIOLOGY | Facility: CLINIC | Age: 73
End: 2023-02-15
Payer: MEDICARE

## 2023-02-15 ENCOUNTER — APPOINTMENT (OUTPATIENT)
Dept: MRI IMAGING | Facility: CLINIC | Age: 73
End: 2023-02-15
Payer: MEDICARE

## 2023-02-15 DIAGNOSIS — Z00.8 ENCOUNTER FOR OTHER GENERAL EXAMINATION: ICD-10-CM

## 2023-02-15 DIAGNOSIS — M48.07 SPINAL STENOSIS, LUMBOSACRAL REGION: ICD-10-CM

## 2023-02-15 DIAGNOSIS — G95.9 DISEASE OF SPINAL CORD, UNSPECIFIED: ICD-10-CM

## 2023-02-15 PROCEDURE — 72141 MRI NECK SPINE W/O DYE: CPT | Mod: 26

## 2023-02-15 PROCEDURE — 72084 X-RAY EXAM ENTIRE SPI 6/> VW: CPT | Mod: 26

## 2023-02-15 PROCEDURE — 72084 X-RAY EXAM ENTIRE SPI 6/> VW: CPT

## 2023-02-15 PROCEDURE — 72082 X-RAY EXAM ENTIRE SPI 2/3 VW: CPT

## 2023-02-15 PROCEDURE — 72141 MRI NECK SPINE W/O DYE: CPT

## 2023-02-15 PROCEDURE — 72110 X-RAY EXAM L-2 SPINE 4/>VWS: CPT

## 2023-02-17 ENCOUNTER — APPOINTMENT (OUTPATIENT)
Dept: ORTHOPEDIC SURGERY | Facility: CLINIC | Age: 73
End: 2023-02-17
Payer: MEDICARE

## 2023-02-17 VITALS — HEIGHT: 64 IN | BODY MASS INDEX: 36.7 KG/M2 | WEIGHT: 215 LBS

## 2023-02-17 DIAGNOSIS — Z96.653 PRESENCE OF ARTIFICIAL KNEE JOINT, BILATERAL: ICD-10-CM

## 2023-02-17 PROCEDURE — 99213 OFFICE O/P EST LOW 20 MIN: CPT

## 2023-02-17 PROCEDURE — 73562 X-RAY EXAM OF KNEE 3: CPT | Mod: 50

## 2023-02-17 NOTE — ADDENDUM
[FreeTextEntry1] : This note was written by NIRAV ROACH on 02/17/2023 acting as scribe for Dr. Cheng Elaine M.D.\par \par I, Dr. Cheng Elaine, have read and attest that all the information, medical decision making and discharge instructions within are true and accurate. \par \par This note was written by Juan Nelson on 02/17/2023 acting as scribe for Dr. Cheng Elaine M.D.\par \par ASHWIN, Dr. Cheng Elaine, have read and attest that all the information, medical decision making and discharge instructions within are true and accurate.

## 2023-02-17 NOTE — PHYSICAL EXAM
[de-identified] : General appearance: well nourished and hydrated, pleasant, alert and oriented x 3, cooperative.\par HEENT: Normocephalic, EOM intact, Nasal septum midline, Oral cavity clear, External auditory canal clear.\par Cardiovascular: no apparent abnormalities, no lower leg edema, no varicosities, pedal pulses are palpable.\par Lymphatics Lymph nodes: none palpated, Lymphedema: not present.\par Neurologic: sensation is normal, no muscle weakness in upper or lower extremities, patella tendon reflexes intact .\par Dermatologic no apparent skin lesions, moist, warm, no rash.\par Spine:cervical spine appears normal and moves freely, thoracic spine appears normal and moves freely, lumbosacral spine appears normal and moves freely.\par Gait: nonantalgic.\par \par Left knee\par Inspection: no effusion or erythema.\par Wounds: healed midline incision\par Alignment: normal.\par Palpation: no specific tenderness on palpation.\par ROM active (in degrees): 0-115\par Ligamentous laxity: all ligaments appear stable,, negative ant. drawer test, negative post. drawer test, stable to varus stress test, stable to valgus stress test. negative Lachman's test, negative pivot shift test\par Meniscal Test: negative McMurrays, negative Phoenix.\par Patellofemoral Alignment Test: Q angle-, normal.\par Muscle Test: good quad strength.\par \par Right knee\par Inspection: no effusion or erythema.\par Wounds: healed midline incision\par Alignment: normal.\par Palpation: no specific tenderness on palpation.\par ROM active (in degrees): 0-115\par Ligamentous laxity: all ligaments appear stable,, negative ant. drawer test, negative post. drawer test, stable to varus stress test, stable to valgus stress test. negative Lachman's test, negative pivot shift test\par Meniscal Test: negative McMurrays, negative Phoenix.\par Patellofemoral Alignment Test: Q angle-, normal.\par Muscle Test: good quad strength. [de-identified] : Right knee x-rays, standing AP/Lateral and Merchant films, and 45 degree PA standing view, taken at the office today shows a total knee replacement in satisfactory position and alignment. No evidence of loosening. The patella sits in a central position. RLL zone 1 1mm beneath the tibia \par \par Left knee x-ray, AP, lateral, merchant view taken at the office today demonstrates a total knee replacement in satisfactory position and alignment. No evidence of loosening. The patella sits in a central position.\par \par No change from prior films.

## 2023-02-17 NOTE — DISCUSSION/SUMMARY
[de-identified] : Patient's B/L TKR are doing well. I have reassured her that her implants are functioning well. I reviewed her films with her and there are no interval changes. Patient is continuing to see Dr. Peña for her back pain.\par \par Patient can continue home exercise and activities as tolerated. All questions were answered, understanding verbalized. Patient is in agreement with plan of treatment. Patient may follow up with x-rays in 1 year.

## 2023-02-17 NOTE — HISTORY OF PRESENT ILLNESS
[de-identified] : ISI CRONIN is a 72 year old female who presents for initial evaluation s/p bilateral TKR at 9 years. States her knee is functioning well, but is having other problems with her back. Pt saw Dr. Peña who sent her for an MRI of the C spine and took x-rays of the lower back. Pt reports some pain in her knees, but only at night. She is undergoing pain management with Hydrocodone 5 x per day and epidurals. Her last epidural was last week, and she is currently in PT for her lower back.

## 2023-02-23 ENCOUNTER — APPOINTMENT (OUTPATIENT)
Dept: SPINE | Facility: CLINIC | Age: 73
End: 2023-02-23
Payer: MEDICARE

## 2023-02-23 ENCOUNTER — RESULT REVIEW (OUTPATIENT)
Age: 73
End: 2023-02-23

## 2023-02-23 VITALS
DIASTOLIC BLOOD PRESSURE: 80 MMHG | BODY MASS INDEX: 36.37 KG/M2 | WEIGHT: 213 LBS | HEART RATE: 81 BPM | SYSTOLIC BLOOD PRESSURE: 136 MMHG | HEIGHT: 64 IN | OXYGEN SATURATION: 93 %

## 2023-02-23 DIAGNOSIS — G95.9 DISEASE OF SPINAL CORD, UNSPECIFIED: ICD-10-CM

## 2023-02-23 DIAGNOSIS — M54.12 DISEASE OF SPINAL CORD, UNSPECIFIED: ICD-10-CM

## 2023-02-23 PROCEDURE — 99214 OFFICE O/P EST MOD 30 MIN: CPT

## 2023-02-24 NOTE — ASSESSMENT
[FreeTextEntry1] : 72 year old female with cervical stenosis and  lumbar stenosis. Cervical MRI scan shows significant disc herniation with central cord impingement at C4-5.  There are degenerative changes to a lesser degree at other levels. C 4-5 ACDF surgery was discussed in detail.. Surgery was a result of shared decision making. All surgical options were reviewed. Risks related of surgery were discussed in detail - including but not limited to CSF leak, infection, nerve damage, temporary or permanent weakness or numbness, hemorrhage, cardiac events, and rarely even death, among others. Repair of CSF leak explained. An explanation of hardware/instrumentation that will be placed in the spine was reviewed and understood. Cervical CT Scan and Cervical Flexion-Extension for surgical planning.\par

## 2023-02-24 NOTE — PHYSICAL EXAM
[Abnormal Walk] : normal gait [2+] : Ankle jerk left 2+ [Smart] : Smart's sign was not demonstrated [FreeTextEntry6] : Mild  and proximal lower extremity weakness 4/5\par \par \par \par \par Mild  and proximal lower extremity weakness 4/5

## 2023-02-24 NOTE — REASON FOR VISIT
[Follow-Up: _____] : a [unfilled] follow-up visit [Other: _____] : [unfilled] [FreeTextEntry1] : Ms. Gresham is a 72 year old female who presented on 2/2/2023 with 20 year history of  low back pain which radiates into bilateral thighs, paraesthesia in soles of feet, and poor balance. Denies any trauma or injury. She also complained of neck pain which radiates down into bilateral hands, numbness in hands and bilateral  weakness. She states the neck pain and upper extremity symptoms begun 6 months ago  after doing physical therapy for her back. She is under the care of pain management Dr. Ablino Medrano and has received multiple lumbar epidural injections with temporary relief. Last epidural was 2/2/2023. She takes Hydrocodone 5 mg daily 4 times a day.  A lumbar MRI (6/16/2022) shows multilevel foraminal stenosis without significant central stenosis. Denies any bladder or bowel dysfunction. \par \par Today she reports ongoing neck and low back pain. MRI of cervical spine, standing scoliosis x-ray, lumbar flex/ext x-rays reviewed today.  Cervical MRI scan shows significant disc herniation with central cord impingement at C4-5.  There are degenerative changes to a lesser degree at other levels.\par \par \par

## 2023-02-24 NOTE — DATA REVIEWED
[de-identified] : Cervical spine from [facility] on 2/15/2023 [de-identified] : Standing scoliosis, Lumbar spine from [facility] on 2/15/2023

## 2023-02-27 ENCOUNTER — NON-APPOINTMENT (OUTPATIENT)
Age: 73
End: 2023-02-27

## 2023-02-28 ENCOUNTER — APPOINTMENT (OUTPATIENT)
Dept: PULMONOLOGY | Facility: CLINIC | Age: 73
End: 2023-02-28

## 2023-02-28 ENCOUNTER — APPOINTMENT (OUTPATIENT)
Dept: PHYSICAL MEDICINE AND REHAB | Facility: CLINIC | Age: 73
End: 2023-02-28
Payer: MEDICARE

## 2023-02-28 VITALS
DIASTOLIC BLOOD PRESSURE: 78 MMHG | SYSTOLIC BLOOD PRESSURE: 134 MMHG | WEIGHT: 212 LBS | RESPIRATION RATE: 17 BRPM | HEART RATE: 76 BPM | OXYGEN SATURATION: 95 % | HEIGHT: 64 IN | TEMPERATURE: 97.2 F | BODY MASS INDEX: 36.19 KG/M2

## 2023-02-28 DIAGNOSIS — G56.03 CARPAL TUNNEL SYNDROM,BILATERAL UPPER LIMBS: ICD-10-CM

## 2023-02-28 PROCEDURE — 95910 NRV CNDJ TEST 7-8 STUDIES: CPT

## 2023-02-28 PROCEDURE — 95886 MUSC TEST DONE W/N TEST COMP: CPT

## 2023-03-02 ENCOUNTER — APPOINTMENT (OUTPATIENT)
Dept: INTERNAL MEDICINE | Facility: CLINIC | Age: 73
End: 2023-03-02

## 2023-03-02 ENCOUNTER — APPOINTMENT (OUTPATIENT)
Dept: CT IMAGING | Facility: IMAGING CENTER | Age: 73
End: 2023-03-02
Payer: MEDICARE

## 2023-03-02 ENCOUNTER — OUTPATIENT (OUTPATIENT)
Dept: OUTPATIENT SERVICES | Facility: HOSPITAL | Age: 73
LOS: 1 days | End: 2023-03-02
Payer: MEDICARE

## 2023-03-02 DIAGNOSIS — G95.9 DISEASE OF SPINAL CORD, UNSPECIFIED: ICD-10-CM

## 2023-03-02 DIAGNOSIS — M48.02 SPINAL STENOSIS, CERVICAL REGION: ICD-10-CM

## 2023-03-02 PROCEDURE — 72052 X-RAY EXAM NECK SPINE 6/>VWS: CPT | Mod: 26

## 2023-03-02 PROCEDURE — 72125 CT NECK SPINE W/O DYE: CPT

## 2023-03-02 PROCEDURE — 72052 X-RAY EXAM NECK SPINE 6/>VWS: CPT

## 2023-03-02 PROCEDURE — 72125 CT NECK SPINE W/O DYE: CPT | Mod: 26

## 2023-03-02 NOTE — ASSESSMENT
[FreeTextEntry1] : Preliminary report\par \par Impression \par Left greater than right carpal tunnel syndrome\par \par Recommendations\par The patient should wear bilateral carpal tunnel splints as much as feasible\par The patient would benefit from use of MetanX PO b.i.d. or the equivalent for neuropathy\par \par Full report to follow

## 2023-03-06 ENCOUNTER — APPOINTMENT (OUTPATIENT)
Dept: PHYSICAL MEDICINE AND REHAB | Facility: CLINIC | Age: 73
End: 2023-03-06

## 2023-03-07 ENCOUNTER — RX CHANGE (OUTPATIENT)
Age: 73
End: 2023-03-07

## 2023-03-13 ENCOUNTER — APPOINTMENT (OUTPATIENT)
Dept: ORTHOPEDIC SURGERY | Facility: CLINIC | Age: 73
End: 2023-03-13
Payer: MEDICARE

## 2023-03-13 VITALS — WEIGHT: 212 LBS | BODY MASS INDEX: 36.19 KG/M2 | HEIGHT: 64 IN

## 2023-03-13 DIAGNOSIS — M54.12 RADICULOPATHY, CERVICAL REGION: ICD-10-CM

## 2023-03-13 DIAGNOSIS — M50.20 OTHER CERVICAL DISC DISPLACEMENT, UNSPECIFIED CERVICAL REGION: ICD-10-CM

## 2023-03-13 PROCEDURE — 99215 OFFICE O/P EST HI 40 MIN: CPT

## 2023-03-16 ENCOUNTER — APPOINTMENT (OUTPATIENT)
Dept: PULMONOLOGY | Facility: CLINIC | Age: 73
End: 2023-03-16

## 2023-03-22 ENCOUNTER — APPOINTMENT (OUTPATIENT)
Dept: INTERNAL MEDICINE | Facility: CLINIC | Age: 73
End: 2023-03-22
Payer: MEDICARE

## 2023-03-22 VITALS
OXYGEN SATURATION: 96 % | DIASTOLIC BLOOD PRESSURE: 75 MMHG | SYSTOLIC BLOOD PRESSURE: 121 MMHG | WEIGHT: 214 LBS | HEIGHT: 64 IN | HEART RATE: 84 BPM | BODY MASS INDEX: 36.54 KG/M2 | TEMPERATURE: 97.8 F

## 2023-03-22 DIAGNOSIS — E66.9 OBESITY, UNSPECIFIED: ICD-10-CM

## 2023-03-22 PROCEDURE — 36415 COLL VENOUS BLD VENIPUNCTURE: CPT

## 2023-03-22 PROCEDURE — G0439: CPT

## 2023-03-22 NOTE — HISTORY OF PRESENT ILLNESS
[FreeTextEntry1] : seeing ortho and neurosurgeon  for back  and neck problems  she has  been having pains down her arms. cervical fusion advised  and later lumbar  sine surgery.  she is seeking a second opinion  using  cpap  on  ppi  which has helped her  gerd sx

## 2023-03-22 NOTE — REVIEW OF SYSTEMS
[Negative] : Heme/Lymph [FreeTextEntry7] : as  above [de-identified] : skin  lesion on back of neck [FreeTextEntry9] : as  above

## 2023-03-22 NOTE — HEALTH RISK ASSESSMENT
[Good] : ~his/her~  mood as  good [Patient reported colonoscopy was normal] : Patient reported colonoscopy was normal [None] : None [Alone] : lives alone [Fully functional (bathing, dressing, toileting, transferring, walking, feeding)] : Fully functional (bathing, dressing, toileting, transferring, walking, feeding) [Fully functional (using the telephone, shopping, preparing meals, housekeeping, doing laundry, using] : Fully functional and needs no help or supervision to perform IADLs (using the telephone, shopping, preparing meals, housekeeping, doing laundry, using transportation, managing medications and managing finances) [No] : No [No falls in past year] : Patient reported no falls in the past year [0] : 2) Feeling down, depressed, or hopeless: Not at all (0) [PHQ-2 Negative - No further assessment needed] : PHQ-2 Negative - No further assessment needed [[id number]] : 0 [Patient reported mammogram was normal] : Patient reported mammogram was normal [Patient reported PAP Smear was normal] : Patient reported PAP Smear was normal [Patient reported bone density results were normal] : Patient reported bone density results were normal [MammogramDate] : 2022 [PapSmearDate] : 2022 [BoneDensityDate] : 2022 [ColonoscopyDate] : 2022 [Former] : Former [20 or more] : 20 or more

## 2023-03-23 ENCOUNTER — NON-APPOINTMENT (OUTPATIENT)
Age: 73
End: 2023-03-23

## 2023-03-23 LAB
ALBUMIN SERPL ELPH-MCNC: 4.2 G/DL
ALP BLD-CCNC: 58 U/L
ALT SERPL-CCNC: 17 U/L
ANION GAP SERPL CALC-SCNC: 14 MMOL/L
AST SERPL-CCNC: 19 U/L
BASOPHILS # BLD AUTO: 0.03 K/UL
BASOPHILS NFR BLD AUTO: 0.7 %
BILIRUB SERPL-MCNC: 0.2 MG/DL
BUN SERPL-MCNC: 17 MG/DL
CALCIUM SERPL-MCNC: 9.9 MG/DL
CHLORIDE SERPL-SCNC: 102 MMOL/L
CHOLEST SERPL-MCNC: 237 MG/DL
CO2 SERPL-SCNC: 26 MMOL/L
CREAT SERPL-MCNC: 0.95 MG/DL
EGFR: 63 ML/MIN/1.73M2
EOSINOPHIL # BLD AUTO: 0.12 K/UL
EOSINOPHIL NFR BLD AUTO: 2.9 %
ESTIMATED AVERAGE GLUCOSE: 128 MG/DL
GLUCOSE SERPL-MCNC: 124 MG/DL
HBA1C MFR BLD HPLC: 6.1 %
HCT VFR BLD CALC: 39.1 %
HDLC SERPL-MCNC: 87 MG/DL
HGB BLD-MCNC: 12.3 G/DL
IMM GRANULOCYTES NFR BLD AUTO: 0 %
LDLC SERPL CALC-MCNC: 98 MG/DL
LYMPHOCYTES # BLD AUTO: 1.57 K/UL
LYMPHOCYTES NFR BLD AUTO: 37.8 %
MAN DIFF?: NORMAL
MCHC RBC-ENTMCNC: 28.1 PG
MCHC RBC-ENTMCNC: 31.5 GM/DL
MCV RBC AUTO: 89.5 FL
MONOCYTES # BLD AUTO: 0.38 K/UL
MONOCYTES NFR BLD AUTO: 9.2 %
NEUTROPHILS # BLD AUTO: 2.05 K/UL
NEUTROPHILS NFR BLD AUTO: 49.4 %
NONHDLC SERPL-MCNC: 150 MG/DL
PLATELET # BLD AUTO: 259 K/UL
POTASSIUM SERPL-SCNC: 3.7 MMOL/L
PROT SERPL-MCNC: 6.5 G/DL
RBC # BLD: 4.37 M/UL
RBC # FLD: 14.3 %
SODIUM SERPL-SCNC: 142 MMOL/L
TRIGL SERPL-MCNC: 262 MG/DL
WBC # FLD AUTO: 4.15 K/UL

## 2023-05-07 ENCOUNTER — RX RENEWAL (OUTPATIENT)
Age: 73
End: 2023-05-07

## 2023-05-21 ENCOUNTER — RX RENEWAL (OUTPATIENT)
Age: 73
End: 2023-05-21

## 2023-06-06 ENCOUNTER — APPOINTMENT (OUTPATIENT)
Dept: CARDIOLOGY | Facility: CLINIC | Age: 73
End: 2023-06-06
Payer: MEDICARE

## 2023-06-06 ENCOUNTER — NON-APPOINTMENT (OUTPATIENT)
Age: 73
End: 2023-06-06

## 2023-06-06 VITALS
BODY MASS INDEX: 36.88 KG/M2 | HEART RATE: 97 BPM | WEIGHT: 216 LBS | SYSTOLIC BLOOD PRESSURE: 160 MMHG | DIASTOLIC BLOOD PRESSURE: 100 MMHG | OXYGEN SATURATION: 95 % | HEIGHT: 64 IN | TEMPERATURE: 98.9 F

## 2023-06-06 VITALS — SYSTOLIC BLOOD PRESSURE: 138 MMHG | DIASTOLIC BLOOD PRESSURE: 92 MMHG

## 2023-06-06 PROCEDURE — 99214 OFFICE O/P EST MOD 30 MIN: CPT | Mod: 25

## 2023-06-06 PROCEDURE — 93000 ELECTROCARDIOGRAM COMPLETE: CPT

## 2023-06-06 NOTE — DISCUSSION/SUMMARY
[FreeTextEntry1] : 72F\par \par Worsening CP, ALVAREZ, PVC's on ECG\par Suspect symptoms are secondary to weight, deconditioning, mild diastolic dysfunction, mild pHTN. TTE stable Will send for CT coronaries to rule out obstructive CAD. Prior EST without ischemia but pt with ongoing symptoms \par \par HTN: Borderline today, cont meds, watch closely \par \par Sinus bradycardia: Improved off BB\par \par RV 6M\par CTA [EKG obtained to assist in diagnosis and management of assessed problem(s)] : EKG obtained to assist in diagnosis and management of assessed problem(s)

## 2023-06-06 NOTE — HISTORY OF PRESENT ILLNESS
[FreeTextEntry1] : 72F with HTN, RA, preDM, mild DD who presents for follow up of LESLY ALVAREZ \par \par Notes that her dyspnea is worsening\par She gets winded with any exertion\par Associated chest tightness \par She feels as if her symptoms have worsened as of late \par \par Beta blocker d/c'ed due to bradycardia in past\par Negative amyloid scan (DD, atrial enlargement, bilateral carpal tunnel) \par \par Sister passed away from CHF, may have been secondary to chemo. Lost  in April '20 to COVID\par \par Remote smoking hx (quit 40+ years ago). Retired X-ray tech. \par \par ECG: Sinus arrhythmia with PVC\par TTE 2023:\par \par 1. Normal left ventricular size, wall thickness, wall motion, and systolic function. Left ventricular ejection fraction is visually estimated at 60 to 65%.\par 2. The left ventricular volumes are mildly increased.\par 3. Mild (Grade 1) left ventricular diastolic dysfunction.\par 4. Normal right ventricular size and systolic function.\par 5. The left atrium is normal in size.\par 6. Mild thickening of the aortic valve leaflets. Mild aortic stenosis.\par 7. PASP at least 40 mmHg (Pk TR gradient 37, no IVC) \par \par EST 5/2021: DTS 7, no ischemia \par \par Amlodipine 10mg\par Benazepril 10mg\par Triameterine-HCTZ 37-25mg

## 2023-06-27 ENCOUNTER — APPOINTMENT (OUTPATIENT)
Dept: RHEUMATOLOGY | Facility: CLINIC | Age: 73
End: 2023-06-27
Payer: MEDICARE

## 2023-06-27 ENCOUNTER — RESULT REVIEW (OUTPATIENT)
Age: 73
End: 2023-06-27

## 2023-06-27 VITALS
HEART RATE: 77 BPM | DIASTOLIC BLOOD PRESSURE: 84 MMHG | HEIGHT: 64 IN | OXYGEN SATURATION: 98 % | WEIGHT: 215 LBS | TEMPERATURE: 97.1 F | RESPIRATION RATE: 16 BRPM | SYSTOLIC BLOOD PRESSURE: 128 MMHG | BODY MASS INDEX: 36.7 KG/M2

## 2023-06-27 DIAGNOSIS — M19.90 UNSPECIFIED OSTEOARTHRITIS, UNSPECIFIED SITE: ICD-10-CM

## 2023-06-27 DIAGNOSIS — M25.511 PAIN IN RIGHT SHOULDER: ICD-10-CM

## 2023-06-27 DIAGNOSIS — M25.512 PAIN IN RIGHT SHOULDER: ICD-10-CM

## 2023-06-27 PROCEDURE — 99214 OFFICE O/P EST MOD 30 MIN: CPT

## 2023-06-27 RX ORDER — PREDNISONE 5 MG/1
5 TABLET ORAL
Qty: 180 | Refills: 0 | Status: DISCONTINUED | COMMUNITY
Start: 2021-05-05 | End: 2023-06-27

## 2023-06-27 NOTE — PHYSICAL EXAM
[General Appearance - Alert] : alert [General Appearance - In No Acute Distress] : in no acute distress [Sclera] : the sclera and conjunctiva were normal [Extraocular Movements] : extraocular movements were intact [Outer Ear] : the ears and nose were normal in appearance [Oropharynx] : the oropharynx was normal [Neck Appearance] : the appearance of the neck was normal [Skin Color & Pigmentation] : normal skin color and pigmentation [Oriented To Time, Place, And Person] : oriented to person, place, and time [Impaired Insight] : insight and judgment were intact [Affect] : the affect was normal [Mood] : the mood was normal [FreeTextEntry1] : dry skin on elbows.

## 2023-06-27 NOTE — HISTORY OF PRESENT ILLNESS
[RF] : rheumatoid factor [Morning Stiffness] : morning stiffness [Fatigue] : fatigue [Joint Swelling] : joint swelling [Joint Pain] : joint pain [FreeTextEntry1] : cavitary pna in 2019 and stopped all ra meds other than HCQ\par history of diverticulosis \par \par her OA is her predomiant symptoms \par she also has cervical myelopathy and is pending surgery in august. \par she has back pain because of spondylisthesis \par also has feet and hand pain \par pain is worst at night \par working with cardiology for worsening CP and SOB and pending CT cornary which will get done before surgery of any type \par  [CCP] : negative CCP antibody [Erosions] : no erosions [Rash] : no rash [Shortness of Breath] : no shortness of breath [Ocular Symptoms] : no ocular symptoms [Dysphonia] : no dysphonia [Chest Pain] : no chest pain [TextBox_2] : 2014 (Dr. Vicente) [TextBox_38] : arava (2009-2/2019 stopped due to infection) [TextBox_40] : HCQ, arava 20mg daily  [TextBox_42] : enbrel ([id number]). humira (2016-2-17) Xeljanz (2-2017-3/2-17 bowel issues and history of diverticulosis) cimzia (11/2017-6-2018), orencia (8-2018 to 11/2018) simponi (-2/2019 - stopped due to infection) [TextBox_70] : intermittent joint pain and swelling in the hands. does not want to change medications for RA

## 2023-06-27 NOTE — HISTORY OF PRESENT ILLNESS
[RF] : rheumatoid factor [Morning Stiffness] : morning stiffness [Fatigue] : fatigue [Joint Swelling] : joint swelling [Joint Pain] : joint pain [FreeTextEntry1] : cavitary pna in 2019 and stopped all ra meds other than HCQ\par history of diverticulosis \par \par her OA is her predomiant symptoms \par she also has cervical myelopathy and is pending surgery in august. \par she has back pain because of spondylisthesis \par also has feet and hand pain \par pain is worst at night \par working with cardiology for worsening CP and SOB and pending CT cornary which will get done before surgery of any type \par  [CCP] : negative CCP antibody [Erosions] : no erosions [Rash] : no rash [Shortness of Breath] : no shortness of breath [Ocular Symptoms] : no ocular symptoms [Dysphonia] : no dysphonia [Chest Pain] : no chest pain [TextBox_2] : 2014 (Dr. Vicente) [TextBox_38] : arava (2009-2/2019 stopped due to infection) [TextBox_40] : HCQ, arava 20mg daily  [TextBox_42] : enbrel ([id number]). humira (2016-2-17) Xeljanz (2-2017-3/2-17 bowel issues and history of diverticulosis) cimzia (11/2017-6-2018), orencia (8-2018 to 11/2018) simponi (-2/2019 - stopped due to infection) [TextBox_70] : intermittent joint pain and swelling in the hands. does not want to change medications for RA

## 2023-06-27 NOTE — DATA REVIEWED
[FreeTextEntry1] : Laboratory and radiology studies that were personally reviewed at the visit on 02/28/2020 are summarized below:\par Cards (6-6-23): CP with ALVAREZ and PVC's and pending CT coronary to rule out obstrutive CAD\par Peña note (3-2023):  L45 L5S1 spondylolisthesis and stenosis  - needs anterior cervical discectomy and fusion of C45\par MRI LS SPine (6-16-22):  grade 1 anterolisthesis of L4-:5, multilevel djd of the L spine with mild spinal canal and moderate to severe neuroforaminal stenosis . \par 7-28-22:  US hands - right hand:  small radiocarpal effusion without active synovitis, no tenosynovitis in  left hand:  moderate arthrosis at the interphalangeal joint of the thumb with mild active synovitis.  no tenosynovitis. \par \par \par 3-2020:  MRI L spine - DJD and disc bulging \par :  X-ray:  hand/wrist/foot/ankle: OA changes \par :  DEXA - WNL \par 9-13-19  Cspine xray  which showed DJD \par 7-3-19:  CT chest with near complete resolution of the nodular opacity in the RUL

## 2023-06-27 NOTE — ASSESSMENT
[FreeTextEntry1] : 72  year year old female\par Seropositive non erosive RA and diverticulitis \par \par 1. Seropositive non erosive RA with pain and swelling in toes. US shows tendinopathy and synovitis. + history of diverticula\par on Plaquenil and arava\par [] US hands in 7-2022:  mild active synovitis at the base of thumb otherwise quiescent. \par [] continue HCQ and follows with optho Q 6 months \par [] xrays 5-2021 from  reviewed and will repeat today \par [] Will check laboratory tests to look for markers of disease activity and also to assess for medication toxicity.\par [] continue HCQ and arava (follow up ophtho)\par [] mild inflammatory changes on US but clinically not significant and risk.> benefit for bdamrds will continue arava/hcq for now and monitor for change \par \par Surgical planning\par [] MRI c spine from 2-2023 without Atlantoaxial involvement \par [] ok to continue arava in perioperative period. \par [] needs medical and cardiology clearance prior to surgery \par \par 2. OA s/p b/l knee replacements and DJD of spine and neck.  On Naprosyn from PMD.  Needs to follow up orthopedics for neck/spine. \par [] continue with orthopedics and pain management (hydrocodone per pain management)\par \par \par 3.  bone health -\par [] 10-8-2019 normal and 3-2022 normal.  check again in 3 years.  \par [] continue calcium and vitamin d \par \par 4. Vitamin D insufficiency\par [] continue supplement\par [] re-check today \par \par 5.  Shoulder pain - suspect RTC disease and OA \par [] check xray shoudler\par More than 50% of the encounter was spent counselling  ISI CRONIN on differential, workup, disease course, and treatment/management.   Education was provided to ISI CRONIN during this encounter. All questions and concerns were answered and addressed in detail.  ISI CRONIN verbalized understanding and agreed to plan\par \par ISI CRONIN has been instructed to call for an earlier appointment if new symptoms develop \par ISI CRONIN has been instructed to make a follow up appointment 3 months

## 2023-06-27 NOTE — HISTORY OF PRESENT ILLNESS
[RF] : rheumatoid factor [Morning Stiffness] : morning stiffness [Fatigue] : fatigue [Joint Swelling] : joint swelling [Joint Pain] : joint pain [FreeTextEntry1] : cavitary pna in 2019 and stopped all ra meds other than HCQ\par history of diverticulosis \par \par her OA is her predomiant symptoms \par she also has cervical myelopathy and is pending surgery in august. \par she has back pain because of spondylisthesis \par also has feet and hand pain \par pain is worst at night \par working with cardiology for worsening CP and SOB and pending CT cornary which will get done before surgery of any type \par  [CCP] : negative CCP antibody [Erosions] : no erosions [Rash] : no rash [Shortness of Breath] : no shortness of breath [Ocular Symptoms] : no ocular symptoms [Dysphonia] : no dysphonia [Chest Pain] : no chest pain [TextBox_2] : 2014 (Dr. Vicente) [TextBox_38] : arava (2009-2/2019 stopped due to infection) [TextBox_40] : HCQ, arava 20mg daily  [TextBox_42] : enbrel ([id number]). humira (2016-2-17) Xeljanz (2-2017-3/2-17 bowel issues and history of diverticulosis) cimzia (11/2017-6-2018), orencia (8-2018 to 11/2018) simponi (-2/2019 - stopped due to infection) [TextBox_70] : intermittent joint pain and swelling in the hands. does not want to change medications for RA

## 2023-06-28 LAB
25(OH)D3 SERPL-MCNC: 23.3 NG/ML
ALBUMIN SERPL ELPH-MCNC: 4.1 G/DL
ALP BLD-CCNC: 57 U/L
ALT SERPL-CCNC: 20 U/L
ANION GAP SERPL CALC-SCNC: 13 MMOL/L
AST SERPL-CCNC: 22 U/L
BILIRUB SERPL-MCNC: 0.2 MG/DL
BUN SERPL-MCNC: 20 MG/DL
CALCIUM SERPL-MCNC: 10.1 MG/DL
CHLORIDE SERPL-SCNC: 103 MMOL/L
CO2 SERPL-SCNC: 28 MMOL/L
CREAT SERPL-MCNC: 0.97 MG/DL
CRP SERPL-MCNC: <3 MG/L
EGFR: 61 ML/MIN/1.73M2
ERYTHROCYTE [SEDIMENTATION RATE] IN BLOOD BY WESTERGREN METHOD: 17 MM/HR
GLUCOSE SERPL-MCNC: 99 MG/DL
HAV IGM SER QL: NONREACTIVE
HBV CORE IGG+IGM SER QL: NONREACTIVE
HBV CORE IGM SER QL: NONREACTIVE
HBV SURFACE AG SER QL: NONREACTIVE
HCT VFR BLD CALC: 39.3 %
HCV AB SER QL: NONREACTIVE
HCV S/CO RATIO: 0.15 S/CO
HGB BLD-MCNC: 12.3 G/DL
MCHC RBC-ENTMCNC: 28.5 PG
MCHC RBC-ENTMCNC: 31.3 GM/DL
MCV RBC AUTO: 91.2 FL
PLATELET # BLD AUTO: 268 K/UL
POTASSIUM SERPL-SCNC: 4.1 MMOL/L
PROT SERPL-MCNC: 6.4 G/DL
RBC # BLD: 4.31 M/UL
RBC # FLD: 14.8 %
SODIUM SERPL-SCNC: 144 MMOL/L
WBC # FLD AUTO: 4.77 K/UL

## 2023-06-29 LAB
M TB IFN-G BLD-IMP: NEGATIVE
QUANTIFERON TB PLUS MITOGEN MINUS NIL: 7.42 IU/ML
QUANTIFERON TB PLUS NIL: 0.02 IU/ML
QUANTIFERON TB PLUS TB1 MINUS NIL: 0 IU/ML
QUANTIFERON TB PLUS TB2 MINUS NIL: 0 IU/ML

## 2023-07-07 ENCOUNTER — OUTPATIENT (OUTPATIENT)
Dept: OUTPATIENT SERVICES | Facility: HOSPITAL | Age: 73
LOS: 1 days | End: 2023-07-07
Payer: MEDICARE

## 2023-07-07 ENCOUNTER — APPOINTMENT (OUTPATIENT)
Dept: RADIOLOGY | Facility: IMAGING CENTER | Age: 73
End: 2023-07-07
Payer: MEDICARE

## 2023-07-07 DIAGNOSIS — M25.511 PAIN IN RIGHT SHOULDER: ICD-10-CM

## 2023-07-07 PROCEDURE — 73030 X-RAY EXAM OF SHOULDER: CPT

## 2023-07-07 PROCEDURE — 73030 X-RAY EXAM OF SHOULDER: CPT | Mod: 26,50

## 2023-07-09 DIAGNOSIS — M25.519 PAIN IN UNSPECIFIED SHOULDER: ICD-10-CM

## 2023-07-10 ENCOUNTER — NON-APPOINTMENT (OUTPATIENT)
Age: 73
End: 2023-07-10

## 2023-08-02 ENCOUNTER — APPOINTMENT (OUTPATIENT)
Dept: CT IMAGING | Facility: CLINIC | Age: 73
End: 2023-08-02
Payer: MEDICARE

## 2023-08-02 ENCOUNTER — OUTPATIENT (OUTPATIENT)
Dept: OUTPATIENT SERVICES | Facility: HOSPITAL | Age: 73
LOS: 1 days | End: 2023-08-02
Payer: MEDICARE

## 2023-08-02 DIAGNOSIS — R06.02 SHORTNESS OF BREATH: ICD-10-CM

## 2023-08-02 DIAGNOSIS — R07.89 OTHER CHEST PAIN: ICD-10-CM

## 2023-08-02 PROCEDURE — 75574 CT ANGIO HRT W/3D IMAGE: CPT | Mod: 26

## 2023-08-02 PROCEDURE — 75574 CT ANGIO HRT W/3D IMAGE: CPT

## 2023-08-03 DIAGNOSIS — Q20.8 OTHER CONGENITAL MALFORMATIONS OF CARDIAC CHAMBERS AND CONNECTIONS: ICD-10-CM

## 2023-08-17 ENCOUNTER — APPOINTMENT (OUTPATIENT)
Dept: INTERNAL MEDICINE | Facility: CLINIC | Age: 73
End: 2023-08-17
Payer: MEDICARE

## 2023-08-17 VITALS
BODY MASS INDEX: 37.05 KG/M2 | HEART RATE: 93 BPM | WEIGHT: 217 LBS | SYSTOLIC BLOOD PRESSURE: 162 MMHG | DIASTOLIC BLOOD PRESSURE: 83 MMHG | OXYGEN SATURATION: 96 % | HEIGHT: 64 IN

## 2023-08-17 DIAGNOSIS — K57.32 DIVERTICULITIS OF LARGE INTESTINE W/OUT PERFORATION OR ABSCESS W/OUT BLEEDING: ICD-10-CM

## 2023-08-17 DIAGNOSIS — K21.9 GASTRO-ESOPHAGEAL REFLUX DISEASE W/OUT ESOPHAGITIS: ICD-10-CM

## 2023-08-17 DIAGNOSIS — R41.3 OTHER AMNESIA: ICD-10-CM

## 2023-08-17 DIAGNOSIS — M17.0 BILATERAL PRIMARY OSTEOARTHRITIS OF KNEE: ICD-10-CM

## 2023-08-17 PROCEDURE — 99214 OFFICE O/P EST MOD 30 MIN: CPT | Mod: 25

## 2023-08-17 PROCEDURE — 36415 COLL VENOUS BLD VENIPUNCTURE: CPT

## 2023-08-17 NOTE — HISTORY OF PRESENT ILLNESS
[FreeTextEntry1] : New patient to me Came for follow up visit  and meet new PCP [de-identified] : Patient is 72 year female  with PMH of , HTN, Insomnia, PreDM  RA Came today to meet new PCP Also Patient presents for follow up for his chronic medical conditions. He reports compliance with all prescribed medical therapy and dietary

## 2023-08-17 NOTE — COUNSELING
[Fall prevention counseling provided] : Fall prevention counseling provided [Potential consequences of obesity discussed] : Potential consequences of obesity discussed [Benefits of weight loss discussed] : Benefits of weight loss discussed [Structured Weight Management Program suggested:] : Structured weight management program suggested

## 2023-08-22 LAB
ALBUMIN SERPL ELPH-MCNC: 4.2 G/DL
ALP BLD-CCNC: 64 U/L
ALT SERPL-CCNC: 30 U/L
ANION GAP SERPL CALC-SCNC: 13 MMOL/L
AST SERPL-CCNC: 22 U/L
BILIRUB SERPL-MCNC: 0.2 MG/DL
BUN SERPL-MCNC: 19 MG/DL
CALCIUM SERPL-MCNC: 9.6 MG/DL
CHLORIDE SERPL-SCNC: 104 MMOL/L
CHOLEST SERPL-MCNC: 221 MG/DL
CO2 SERPL-SCNC: 28 MMOL/L
CREAT SERPL-MCNC: 0.93 MG/DL
EGFR: 64 ML/MIN/1.73M2
ESTIMATED AVERAGE GLUCOSE: 123 MG/DL
FOLATE SERPL-MCNC: >20 NG/ML
GLUCOSE SERPL-MCNC: 112 MG/DL
HBA1C MFR BLD HPLC: 5.9 %
HCT VFR BLD CALC: 38.5 %
HDLC SERPL-MCNC: 89 MG/DL
HGB BLD-MCNC: 11.8 G/DL
IRON SERPL-MCNC: 77 UG/DL
LDLC SERPL CALC-MCNC: 111 MG/DL
MCHC RBC-ENTMCNC: 28.2 PG
MCHC RBC-ENTMCNC: 30.6 GM/DL
MCV RBC AUTO: 92.1 FL
NONHDLC SERPL-MCNC: 132 MG/DL
PLATELET # BLD AUTO: 277 K/UL
POTASSIUM SERPL-SCNC: 4.4 MMOL/L
PROT SERPL-MCNC: 6.5 G/DL
RBC # BLD: 4.18 M/UL
RBC # FLD: 15 %
SODIUM SERPL-SCNC: 145 MMOL/L
TRIGL SERPL-MCNC: 127 MG/DL
VIT B12 SERPL-MCNC: 1384 PG/ML
WBC # FLD AUTO: 4.33 K/UL

## 2023-08-25 ENCOUNTER — NON-APPOINTMENT (OUTPATIENT)
Age: 73
End: 2023-08-25

## 2023-08-25 ENCOUNTER — OUTPATIENT (OUTPATIENT)
Dept: OUTPATIENT SERVICES | Facility: HOSPITAL | Age: 73
LOS: 1 days | End: 2023-08-25
Payer: MEDICARE

## 2023-08-25 DIAGNOSIS — Q20.8 OTHER CONGENITAL MALFORMATIONS OF CARDIAC CHAMBERS AND CONNECTIONS: ICD-10-CM

## 2023-08-25 PROCEDURE — 93325 DOPPLER ECHO COLOR FLOW MAPG: CPT | Mod: 26,GC

## 2023-08-25 PROCEDURE — 93312 ECHO TRANSESOPHAGEAL: CPT | Mod: 26

## 2023-08-25 PROCEDURE — 93320 DOPPLER ECHO COMPLETE: CPT | Mod: 26,GC

## 2023-08-25 RX ORDER — SODIUM CHLORIDE 9 MG/ML
3 INJECTION INTRAMUSCULAR; INTRAVENOUS; SUBCUTANEOUS EVERY 8 HOURS
Refills: 0 | Status: DISCONTINUED | OUTPATIENT
Start: 2023-08-25 | End: 2023-09-08

## 2023-08-25 RX ORDER — METOPROLOL TARTRATE 50 MG
1 TABLET ORAL
Qty: 0 | Refills: 0 | DISCHARGE

## 2023-08-25 RX ORDER — TRIAMTERENE/HYDROCHLOROTHIAZID 75 MG-50MG
1 TABLET ORAL
Qty: 0 | Refills: 0 | DISCHARGE

## 2023-08-25 RX ORDER — GABAPENTIN 400 MG/1
1 CAPSULE ORAL
Qty: 0 | Refills: 0 | DISCHARGE

## 2023-08-25 RX ORDER — AMLODIPINE BESYLATE 2.5 MG/1
1 TABLET ORAL
Qty: 0 | Refills: 0 | DISCHARGE

## 2023-08-25 RX ORDER — TRAZODONE HCL 50 MG
1 TABLET ORAL
Refills: 0 | DISCHARGE

## 2023-08-25 RX ORDER — LEFLUNOMIDE 10 MG/1
1 TABLET ORAL
Refills: 0 | DISCHARGE

## 2023-08-25 RX ORDER — OXYMETAZOLINE HYDROCHLORIDE 0.5 MG/ML
2 SPRAY NASAL EVERY 12 HOURS
Refills: 0 | Status: DISCONTINUED | OUTPATIENT
Start: 2023-08-25 | End: 2023-09-08

## 2023-08-25 RX ORDER — HYDROXYCHLOROQUINE SULFATE 200 MG
1 TABLET ORAL
Qty: 0 | Refills: 0 | DISCHARGE

## 2023-08-25 RX ORDER — HYDROCODONE BITARTRATE AND ACETAMINOPHEN 7.5; 325 MG/15ML; MG/15ML
1 SOLUTION ORAL
Qty: 0 | Refills: 0 | DISCHARGE

## 2023-08-25 RX ORDER — BENAZEPRIL HYDROCHLORIDE 40 MG/1
1 TABLET ORAL
Qty: 0 | Refills: 0 | DISCHARGE

## 2023-08-25 NOTE — H&P CARDIOLOGY - RS GEN PE MLT RESP DETAILS PC
Quality 130: Documentation Of Current Medications In The Medical Record: Current Medications with Name, Dosage, Frequency, or Route not Documented, Reason not Given Detail Level: Generalized airway patent/breath sounds equal/good air movement/respirations non-labored/clear to auscultation bilaterally

## 2023-08-25 NOTE — H&P CARDIOLOGY - HISTORY OF PRESENT ILLNESS
73 y/o F w/ PMH of HTN, RA and OA presents for elective MANDY. Pt has been experiencing ALVAREZ for the past 6 months and was sent for CTA coronaries to r/o CAD. CTA coronaries showed no obstructive CAD but did have an incidental finding of possible JACQUELINE thrombus, a small left to right trans-atrial flow jet suggestive of small PFO vs ASD and possible atrial septal aneurysm.

## 2023-08-25 NOTE — H&P CARDIOLOGY - NSICDXPASTSURGICALHX_GEN_ALL_CORE_FT
PAST SURGICAL HISTORY:  S/P arthroscopic knee surgery     S/P carpal tunnel release     S/P colonoscopy     S/P cubital tunnel release bilateral about 5 years ago.    S/P lumpectomy of breast     S/P myomectomy about 25 years ago.

## 2023-11-02 ENCOUNTER — APPOINTMENT (OUTPATIENT)
Dept: BARIATRICS | Facility: CLINIC | Age: 73
End: 2023-11-02

## 2023-11-06 ENCOUNTER — APPOINTMENT (OUTPATIENT)
Dept: INTERNAL MEDICINE | Facility: CLINIC | Age: 73
End: 2023-11-06

## 2023-11-15 ENCOUNTER — APPOINTMENT (OUTPATIENT)
Dept: ENDOCRINOLOGY | Facility: CLINIC | Age: 73
End: 2023-11-15

## 2023-11-19 ENCOUNTER — RX RENEWAL (OUTPATIENT)
Age: 73
End: 2023-11-19

## 2023-11-28 ENCOUNTER — RESULT REVIEW (OUTPATIENT)
Age: 73
End: 2023-11-28

## 2023-11-28 ENCOUNTER — APPOINTMENT (OUTPATIENT)
Dept: RHEUMATOLOGY | Facility: CLINIC | Age: 73
End: 2023-11-28
Payer: MEDICARE

## 2023-11-28 VITALS
OXYGEN SATURATION: 95 % | HEART RATE: 89 BPM | SYSTOLIC BLOOD PRESSURE: 155 MMHG | DIASTOLIC BLOOD PRESSURE: 84 MMHG | HEIGHT: 64 IN

## 2023-11-28 PROCEDURE — 99215 OFFICE O/P EST HI 40 MIN: CPT

## 2023-11-29 LAB
25(OH)D3 SERPL-MCNC: 22.2 NG/ML
ALBUMIN SERPL ELPH-MCNC: 4.1 G/DL
ALP BLD-CCNC: 62 U/L
ALT SERPL-CCNC: 25 U/L
ANION GAP SERPL CALC-SCNC: 11 MMOL/L
AST SERPL-CCNC: 22 U/L
BASOPHILS # BLD AUTO: 0.04 K/UL
BASOPHILS NFR BLD AUTO: 0.8 %
BILIRUB SERPL-MCNC: 0.2 MG/DL
BUN SERPL-MCNC: 17 MG/DL
CALCIUM SERPL-MCNC: 9.8 MG/DL
CHLORIDE SERPL-SCNC: 103 MMOL/L
CO2 SERPL-SCNC: 28 MMOL/L
CREAT SERPL-MCNC: 0.92 MG/DL
CRP SERPL-MCNC: <3 MG/L
EGFR: 65 ML/MIN/1.73M2
EOSINOPHIL # BLD AUTO: 0.11 K/UL
EOSINOPHIL NFR BLD AUTO: 2.2 %
ERYTHROCYTE [SEDIMENTATION RATE] IN BLOOD BY WESTERGREN METHOD: 38 MM/HR
GLUCOSE SERPL-MCNC: 103 MG/DL
HAV IGM SER QL: NONREACTIVE
HBV CORE IGG+IGM SER QL: NONREACTIVE
HBV CORE IGM SER QL: NONREACTIVE
HBV SURFACE AG SER QL: NONREACTIVE
HCT VFR BLD CALC: 39.8 %
HCV AB SER QL: NONREACTIVE
HCV S/CO RATIO: 0.09 S/CO
HGB BLD-MCNC: 12.3 G/DL
IMM GRANULOCYTES NFR BLD AUTO: 0.4 %
LYMPHOCYTES # BLD AUTO: 1.34 K/UL
LYMPHOCYTES NFR BLD AUTO: 27.3 %
MAN DIFF?: NORMAL
MCHC RBC-ENTMCNC: 27.6 PG
MCHC RBC-ENTMCNC: 30.9 GM/DL
MCV RBC AUTO: 89.2 FL
MONOCYTES # BLD AUTO: 0.65 K/UL
MONOCYTES NFR BLD AUTO: 13.2 %
NEUTROPHILS # BLD AUTO: 2.75 K/UL
NEUTROPHILS NFR BLD AUTO: 56.1 %
PLATELET # BLD AUTO: 277 K/UL
POTASSIUM SERPL-SCNC: 4 MMOL/L
PROT SERPL-MCNC: 6.6 G/DL
RBC # BLD: 4.46 M/UL
RBC # FLD: 16 %
SODIUM SERPL-SCNC: 142 MMOL/L
WBC # FLD AUTO: 4.91 K/UL

## 2023-12-01 LAB
M TB IFN-G BLD-IMP: NEGATIVE
QUANTIFERON TB PLUS MITOGEN MINUS NIL: 5.58 IU/ML
QUANTIFERON TB PLUS NIL: 0.06 IU/ML
QUANTIFERON TB PLUS TB1 MINUS NIL: 0 IU/ML
QUANTIFERON TB PLUS TB2 MINUS NIL: 0 IU/ML

## 2023-12-05 ENCOUNTER — APPOINTMENT (OUTPATIENT)
Dept: ULTRASOUND IMAGING | Facility: CLINIC | Age: 73
End: 2023-12-05
Payer: MEDICARE

## 2023-12-05 ENCOUNTER — NON-APPOINTMENT (OUTPATIENT)
Age: 73
End: 2023-12-05

## 2023-12-05 ENCOUNTER — OUTPATIENT (OUTPATIENT)
Dept: OUTPATIENT SERVICES | Facility: HOSPITAL | Age: 73
LOS: 1 days | End: 2023-12-05
Payer: MEDICARE

## 2023-12-05 ENCOUNTER — APPOINTMENT (OUTPATIENT)
Dept: RADIOLOGY | Facility: CLINIC | Age: 73
End: 2023-12-05
Payer: MEDICARE

## 2023-12-05 DIAGNOSIS — M06.9 RHEUMATOID ARTHRITIS, UNSPECIFIED: ICD-10-CM

## 2023-12-05 PROCEDURE — 76882 US LMTD JT/FCL EVL NVASC XTR: CPT | Mod: 26,RT

## 2023-12-05 PROCEDURE — 76882 US LMTD JT/FCL EVL NVASC XTR: CPT | Mod: 26,LT

## 2023-12-05 PROCEDURE — 76882 US LMTD JT/FCL EVL NVASC XTR: CPT

## 2023-12-14 ENCOUNTER — APPOINTMENT (OUTPATIENT)
Dept: INTERNAL MEDICINE | Facility: CLINIC | Age: 73
End: 2023-12-14

## 2024-01-10 ENCOUNTER — LABORATORY RESULT (OUTPATIENT)
Age: 74
End: 2024-01-10

## 2024-01-10 ENCOUNTER — APPOINTMENT (OUTPATIENT)
Dept: INTERNAL MEDICINE | Facility: CLINIC | Age: 74
End: 2024-01-10
Payer: MEDICARE

## 2024-01-10 VITALS
DIASTOLIC BLOOD PRESSURE: 84 MMHG | OXYGEN SATURATION: 95 % | BODY MASS INDEX: 36.54 KG/M2 | HEIGHT: 64 IN | WEIGHT: 214 LBS | SYSTOLIC BLOOD PRESSURE: 147 MMHG | HEART RATE: 80 BPM

## 2024-01-10 DIAGNOSIS — E04.1 NONTOXIC SINGLE THYROID NODULE: ICD-10-CM

## 2024-01-10 DIAGNOSIS — G47.00 INSOMNIA, UNSPECIFIED: ICD-10-CM

## 2024-01-10 DIAGNOSIS — E55.9 VITAMIN D DEFICIENCY, UNSPECIFIED: ICD-10-CM

## 2024-01-10 DIAGNOSIS — M06.9 RHEUMATOID ARTHRITIS, UNSPECIFIED: ICD-10-CM

## 2024-01-10 PROCEDURE — 36415 COLL VENOUS BLD VENIPUNCTURE: CPT

## 2024-01-10 PROCEDURE — 99214 OFFICE O/P EST MOD 30 MIN: CPT | Mod: 25

## 2024-01-10 RX ORDER — L-METHYLFOLATE-ALGAE-VIT B12-B6 CAP 3-90.314-2-35 MG 3-90.314-2-35 MG
3-90.314-2-35 CAP ORAL
Qty: 60 | Refills: 0 | Status: DISCONTINUED | COMMUNITY
Start: 2023-02-07 | End: 2024-01-10

## 2024-01-10 RX ORDER — PREDNISONE 2.5 MG/1
2.5 TABLET ORAL
Qty: 70 | Refills: 0 | Status: DISCONTINUED | COMMUNITY
Start: 2023-12-06 | End: 2024-01-10

## 2024-01-10 RX ORDER — LEVOMEFOLATE/B6/B12/ALGAL OIL 3-35-2 MG
3-90.314-2-35 CAPSULE ORAL
Qty: 60 | Refills: 0 | Status: COMPLETED | COMMUNITY
Start: 2023-03-07 | End: 2024-01-10

## 2024-01-10 NOTE — HISTORY OF PRESENT ILLNESS
[FreeTextEntry1] :  Came for follow up visit   [de-identified] : Patient is 73 year female  with PMH of , HTN, Insomnia, PreDM  RA  Also Patient presents for follow up for his chronic medical conditions. He reports compliance with all prescribed medical therapy and dietary Patient states that when she took Benazepril 20 mg she started drooling with excess salivation, when she get back to 10 mg QD all symptoms gone Currently on  Amlodipine 10 mg QD Benazepril 10 mg QD Triamterene/HCTZ 37.5/25 mg QD Pantoprazole 40 mg qd Trazodone 50 mg QHS Leflunomide 20 mg QD Plaquenil 200mg QD  Restasis 0.05%  Refused to do flu vaccine

## 2024-01-10 NOTE — PHYSICAL EXAM
[de-identified] : mild pedal edema, deformed and discolored toe nails [TextEntry] : General: alert, awake, oriented  X 3, Ears: Left ear - cerumen impacted, right Ear canal  is  clear b/l , no discharge. Tympanic membrane is   intact  Nose: Mucosa normal, no obstruction.  Throat: Clear, no exudates, no lesions.  Neck: Supple, no masses,  Chest: Lungs are  clear b/l , no rales, no rhonchi, no wheezes.  Heart: RR, no murmurs, no rubs, no gallops.  Abdomen: Soft, no tenderness in all quadrant , no masses, BS normal.  Extremities: FROM, no deformities, no edema, no erythema.  Neuro: Low back pain

## 2024-01-11 LAB
APPEARANCE: CLEAR
BILIRUBIN URINE: NEGATIVE
BLOOD URINE: NEGATIVE
CHOLEST SERPL-MCNC: 232 MG/DL
COLOR: YELLOW
ESTIMATED AVERAGE GLUCOSE: 126 MG/DL
GLUCOSE QUALITATIVE U: NEGATIVE MG/DL
HBA1C MFR BLD HPLC: 6 %
HCT VFR BLD CALC: 40.3 %
HDLC SERPL-MCNC: 87 MG/DL
HGB BLD-MCNC: 12.8 G/DL
KETONES URINE: NEGATIVE MG/DL
LDLC SERPL CALC-MCNC: 125 MG/DL
LEUKOCYTE ESTERASE URINE: ABNORMAL
MCHC RBC-ENTMCNC: 28.4 PG
MCHC RBC-ENTMCNC: 31.8 GM/DL
MCV RBC AUTO: 89.6 FL
NITRITE URINE: NEGATIVE
NONHDLC SERPL-MCNC: 145 MG/DL
PH URINE: 5.5
PLATELET # BLD AUTO: 286 K/UL
PROTEIN URINE: NEGATIVE MG/DL
RBC # BLD: 4.5 M/UL
RBC # FLD: 15.5 %
SPECIFIC GRAVITY URINE: 1.02
T3FREE SERPL-MCNC: 3.32 PG/ML
T4 FREE SERPL-MCNC: 1.3 NG/DL
TRIGL SERPL-MCNC: 114 MG/DL
TSH SERPL-ACNC: 0.55 UIU/ML
UROBILINOGEN URINE: 0.2 MG/DL
WBC # FLD AUTO: 3.96 K/UL

## 2024-01-11 RX ORDER — SIMVASTATIN 10 MG/1
10 TABLET, FILM COATED ORAL
Qty: 90 | Refills: 1 | Status: ACTIVE | COMMUNITY
Start: 2024-01-11 | End: 1900-01-01

## 2024-01-15 RX ORDER — TRIAMTERENE AND HYDROCHLOROTHIAZIDE 37.5; 25 MG/1; MG/1
37.5-25 CAPSULE ORAL
Qty: 90 | Refills: 1 | Status: ACTIVE | COMMUNITY
Start: 2020-11-12 | End: 1900-01-01

## 2024-01-22 ENCOUNTER — APPOINTMENT (OUTPATIENT)
Dept: ORTHOPEDIC SURGERY | Facility: CLINIC | Age: 74
End: 2024-01-22

## 2024-02-01 ENCOUNTER — APPOINTMENT (OUTPATIENT)
Dept: ORTHOPEDIC SURGERY | Facility: CLINIC | Age: 74
End: 2024-02-01

## 2024-02-15 ENCOUNTER — APPOINTMENT (OUTPATIENT)
Dept: INTERNAL MEDICINE | Facility: CLINIC | Age: 74
End: 2024-02-15
Payer: MEDICARE

## 2024-02-15 VITALS — DIASTOLIC BLOOD PRESSURE: 84 MMHG | SYSTOLIC BLOOD PRESSURE: 160 MMHG

## 2024-02-15 VITALS
SYSTOLIC BLOOD PRESSURE: 171 MMHG | BODY MASS INDEX: 37.22 KG/M2 | OXYGEN SATURATION: 97 % | HEART RATE: 80 BPM | DIASTOLIC BLOOD PRESSURE: 98 MMHG | HEIGHT: 64 IN | WEIGHT: 218 LBS

## 2024-02-15 DIAGNOSIS — J18.9 PNEUMONIA, UNSPECIFIED ORGANISM: ICD-10-CM

## 2024-02-15 DIAGNOSIS — Z78.0 ASYMPTOMATIC MENOPAUSAL STATE: ICD-10-CM

## 2024-02-15 DIAGNOSIS — R07.89 OTHER CHEST PAIN: ICD-10-CM

## 2024-02-15 DIAGNOSIS — Z86.19 PERSONAL HISTORY OF OTHER INFECTIOUS AND PARASITIC DISEASES: ICD-10-CM

## 2024-02-15 DIAGNOSIS — Z87.898 PERSONAL HISTORY OF OTHER SPECIFIED CONDITIONS: ICD-10-CM

## 2024-02-15 DIAGNOSIS — U07.1 COVID-19: ICD-10-CM

## 2024-02-15 DIAGNOSIS — Z51.81 ENCOUNTER FOR THERAPEUTIC DRUG LVL MONITORING: ICD-10-CM

## 2024-02-15 DIAGNOSIS — R91.8 OTHER NONSPECIFIC ABNORMAL FINDING OF LUNG FIELD: ICD-10-CM

## 2024-02-15 DIAGNOSIS — J98.4 PNEUMONIA, UNSPECIFIED ORGANISM: ICD-10-CM

## 2024-02-15 DIAGNOSIS — R07.81 PLEURODYNIA: ICD-10-CM

## 2024-02-15 DIAGNOSIS — T78.40XA ALLERGY, UNSPECIFIED, INITIAL ENCOUNTER: ICD-10-CM

## 2024-02-15 DIAGNOSIS — M75.52 BURSITIS OF LEFT SHOULDER: ICD-10-CM

## 2024-02-15 PROCEDURE — 99214 OFFICE O/P EST MOD 30 MIN: CPT

## 2024-02-15 RX ORDER — BENAZEPRIL HYDROCHLORIDE 10 MG/1
10 TABLET, FILM COATED ORAL
Qty: 60 | Refills: 0 | Status: DISCONTINUED | COMMUNITY
Start: 2024-02-08 | End: 2024-02-15

## 2024-02-15 RX ORDER — BENAZEPRIL HYDROCHLORIDE 20 MG/1
20 TABLET, FILM COATED ORAL
Qty: 90 | Refills: 1 | Status: DISCONTINUED | COMMUNITY
Start: 2018-01-10 | End: 2024-02-15

## 2024-02-15 NOTE — PLAN
[FreeTextEntry1] : Hypertension uncontrolled She will start carvedilol 6.25 mg twice daily and continue amlodipine 10 mg and triamterene/hydrochlorothiazide 37.5/25 mg.  She will stop the benazepril. She will continue to work on diet, get more exercise and lose weight.  She has questions about starting Wegovy or of the other GLP-1 medications She will follow-up in 1 month to recheck her blood pressure  Hyperlipidemia Given that she is concerned about starting more than 1 medication at a time we will hold off on starting simvastatin today and will readdress at her next visit if she does not have any side effects with the carvedilol.  Follow-up 1 month

## 2024-02-15 NOTE — HISTORY OF PRESENT ILLNESS
[FreeTextEntry1] : f/u BP [de-identified] : 72 y/o female with h/o rheumatoid arthritis, borderline diabetes, hypertension Benazepril was increased to 20 mg but she developed excessive salivation and drooling. She went back to 10 mg and the symptoms resolved. She was then changed  to carvedilol but she didnt take it because in another office and her BP was ok and she thought that 2 BP medication changes were too much (because she thought SImvastatin was for BP)

## 2024-03-11 ENCOUNTER — APPOINTMENT (OUTPATIENT)
Dept: ENDOCRINOLOGY | Facility: CLINIC | Age: 74
End: 2024-03-11

## 2024-03-21 ENCOUNTER — APPOINTMENT (OUTPATIENT)
Dept: CARDIOLOGY | Facility: CLINIC | Age: 74
End: 2024-03-21
Payer: MEDICARE

## 2024-03-21 ENCOUNTER — APPOINTMENT (OUTPATIENT)
Dept: INTERNAL MEDICINE | Facility: CLINIC | Age: 74
End: 2024-03-21
Payer: MEDICARE

## 2024-03-21 VITALS
BODY MASS INDEX: 37.22 KG/M2 | OXYGEN SATURATION: 96 % | DIASTOLIC BLOOD PRESSURE: 90 MMHG | HEIGHT: 64 IN | SYSTOLIC BLOOD PRESSURE: 132 MMHG | WEIGHT: 218 LBS | HEART RATE: 76 BPM

## 2024-03-21 VITALS — DIASTOLIC BLOOD PRESSURE: 76 MMHG | SYSTOLIC BLOOD PRESSURE: 137 MMHG

## 2024-03-21 DIAGNOSIS — E78.00 PURE HYPERCHOLESTEROLEMIA, UNSPECIFIED: ICD-10-CM

## 2024-03-21 DIAGNOSIS — I27.20 PULMONARY HYPERTENSION, UNSPECIFIED: ICD-10-CM

## 2024-03-21 DIAGNOSIS — I51.9 HEART DISEASE, UNSPECIFIED: ICD-10-CM

## 2024-03-21 PROCEDURE — G2211 COMPLEX E/M VISIT ADD ON: CPT

## 2024-03-21 PROCEDURE — 99214 OFFICE O/P EST MOD 30 MIN: CPT

## 2024-03-21 NOTE — DISCUSSION/SUMMARY
[FreeTextEntry1] : 73F  Dyspnea: Stable cardiac symptoms, ALVAREZ, PVC's. CTA negative for CAD  Mild pHTN on TTE Mild DD on TTE Cont BP control. Serial echos  HTN: Borderline today, cont meds, watch closely. Off ACEi due to drooling, excessive salivation. Now back on beta blocker. Continue meds as dosed  LE edema: None on exam, compression socks, leg elevation, takes diuretic as above. Watch salt  PFO detected on MANDY. To take asa before long car drives or plane flights   RV 6M

## 2024-03-21 NOTE — HISTORY OF PRESENT ILLNESS
[FreeTextEntry1] : 73F with HTN, RA, preDM, mild DD who presents for follow up  At last visit- went for coronary CT, JACQUELINE with incomplete opacification MANDY without thrombus, small PFO with L to R shunt  Main complaint is some lower extremity edema Notes worse when standing for too long a period of time Stable dyspnea when climbing stairs  Denies CP Beta blocker d/c'ed due to bradycardia in past- now back on, ACEi d/c'ed due excessive salivation and drooling   Negative amyloid scan (DD, atrial enlargement, bilateral carpal tunnel)   Sister passed away from CHF, may have been secondary to chemo. Lost  in April '20 to COVID Remote smoking hx (quit 40+ years ago). Retired X-ray tech.   ECG: Sinus arrhythmia with PVC TTE 2023:  1. Normal left ventricular size, wall thickness, wall motion, and systolic function. Left ventricular ejection fraction is visually estimated at 60 to 65%. 2. The left ventricular volumes are mildly increased. 3. Mild (Grade 1) left ventricular diastolic dysfunction. 4. Normal right ventricular size and systolic function. 5. The left atrium is normal in size. 6. Mild thickening of the aortic valve leaflets. Mild aortic stenosis. 7. PASP at least 40 mmHg (Pk TR gradient 37, no IVC)   EST 5/2021: DTS 7, no ischemia   CTA 8/2023:  1. Non obstructive disease of the visualized epicardial coronary arteries by cardiac CTA as detailed above 2. Agatston calcium score of 0. 3. Incomplete opacification of the left atrial appendage (JACQUELINE), may represent JACQUELINE thrombus vs slow flow, recommend dedicated cardiac imaging as clinically indicated. 4. Small left to right trans-atrial flow jet suggestive of small patent foramen ovale vs atrial septal defect. Possible atrial septal aneurysm. 5. Borderline left ventricular wall thickness. Atrial are qualitatively enlarged, recommend correlation with echocardiogram as clinically indicated.  MANDY 2023: Normal LV function, ASA with small ASD .  Amlodipine 10mg Triamterene-HCTZ 37-25mg Carvedilol 10mg daily

## 2024-03-21 NOTE — HISTORY OF PRESENT ILLNESS
[FreeTextEntry1] : f/u BP [de-identified] : 72 y/o female with h/o rheumatoid arthritis, borderline diabetes, hypertension Last visit benazepril as changed to Coreg 6.25 mg bid. Initially she took it once daily because she felt tired on it twice daily. But now able to take it twice per day. However she forgets to take both doses. No dizziness. No headaches. No nausea/vomiting

## 2024-03-21 NOTE — PHYSICAL EXAM
[Well Developed] : well developed [Well Nourished] : well nourished [Normal Conjunctiva] : normal conjunctiva [No Acute Distress] : no acute distress [Normal Venous Pressure] : normal venous pressure [No Carotid Bruit] : no carotid bruit [Normal S1, S2] : normal S1, S2 [No Murmur] : no murmur [No Rub] : no rub [Clear Lung Fields] : clear lung fields [No Gallop] : no gallop [Good Air Entry] : good air entry [No Respiratory Distress] : no respiratory distress  [Soft] : abdomen soft [Non Tender] : non-tender [No Masses/organomegaly] : no masses/organomegaly [Normal Bowel Sounds] : normal bowel sounds [Normal Gait] : normal gait [No Cyanosis] : no cyanosis [No Edema] : no edema [No Varicosities] : no varicosities [No Clubbing] : no clubbing [No Rash] : no rash [No Skin Lesions] : no skin lesions [Moves all extremities] : moves all extremities [No Focal Deficits] : no focal deficits [Normal Speech] : normal speech [Alert and Oriented] : alert and oriented [Normal memory] : normal memory

## 2024-03-21 NOTE — PHYSICAL EXAM
[No Acute Distress] : no acute distress [Well Nourished] : well nourished [Normal Sclera/Conjunctiva] : normal sclera/conjunctiva [No Respiratory Distress] : no respiratory distress  [No Accessory Muscle Use] : no accessory muscle use [Clear to Auscultation] : lungs were clear to auscultation bilaterally [Normal Rate] : normal rate  [Regular Rhythm] : with a regular rhythm [Normal S1, S2] : normal S1 and S2 [Normal Affect] : the affect was normal [Normal Insight/Judgement] : insight and judgment were intact

## 2024-03-21 NOTE — PLAN
[FreeTextEntry1] : Hypertension BP is better controlled on carvedilol 6.25 mg twice daily, amlodipine 10 mg and triamterene/hydrochlorothiazide 37.5/25 mg.  However will change carvedilol XR 10 mg for improved compliance. She will continue to work on diet, get more exercise and lose weight.    Hyperlipidemia . Will monitor for ow  follow up 6 months

## 2024-03-24 NOTE — PHYSICAL EXAM
[FreeTextEntry1] : Mr. PABLO DREW is an 72 year old male, history of COPD, lung nodule, longstanding former cigarette smoking. Improved lung function although still not normal. [de-identified] : mild pedal edema, deformed and discolored toe nails [TextEntry] : General: alert, awake, oriented  X 3, Ears: Ear canal  is  clear b/l , no discharge. Tympanic membrane is   intact b/l Nose: Mucosa normal, no obstruction.  Throat: Clear, no exudates, no lesions.  Neck: Supple, no masses,  Chest: Lungs are  clear b/l , no rales, no rhonchi, no wheezes.  Heart: RR, no murmurs, no rubs, no gallops.  Abdomen: Soft, no tenderness in all quadrant , no masses, BS normal.  Extremities: FROM, no deformities, no edema, no erythema.  Neuro: Physiological, no localizing findings.  Skin: Normal, no rashes, no lesions noted.

## 2024-03-25 ENCOUNTER — APPOINTMENT (OUTPATIENT)
Dept: RHEUMATOLOGY | Facility: CLINIC | Age: 74
End: 2024-03-25

## 2024-04-09 ENCOUNTER — RX RENEWAL (OUTPATIENT)
Age: 74
End: 2024-04-09

## 2024-04-09 RX ORDER — AMLODIPINE BESYLATE 10 MG/1
10 TABLET ORAL
Qty: 90 | Refills: 1 | Status: ACTIVE | COMMUNITY
Start: 2016-09-02 | End: 1900-01-01

## 2024-04-17 ENCOUNTER — RX RENEWAL (OUTPATIENT)
Age: 74
End: 2024-04-17

## 2024-04-17 RX ORDER — PANTOPRAZOLE 40 MG/1
40 TABLET, DELAYED RELEASE ORAL
Qty: 90 | Refills: 0 | Status: ACTIVE | COMMUNITY
Start: 2023-02-09 | End: 1900-01-01

## 2024-04-18 ENCOUNTER — APPOINTMENT (OUTPATIENT)
Dept: ORTHOPEDIC SURGERY | Facility: CLINIC | Age: 74
End: 2024-04-18
Payer: MEDICARE

## 2024-04-18 VITALS — WEIGHT: 200 LBS | HEIGHT: 64 IN | BODY MASS INDEX: 34.15 KG/M2

## 2024-04-18 DIAGNOSIS — M21.42 FLAT FOOT [PES PLANUS] (ACQUIRED), RIGHT FOOT: ICD-10-CM

## 2024-04-18 DIAGNOSIS — M62.89 OTHER SPECIFIED DISORDERS OF MUSCLE: ICD-10-CM

## 2024-04-18 DIAGNOSIS — M20.21 HALLUX RIGIDUS, RIGHT FOOT: ICD-10-CM

## 2024-04-18 DIAGNOSIS — M21.41 FLAT FOOT [PES PLANUS] (ACQUIRED), RIGHT FOOT: ICD-10-CM

## 2024-04-18 PROCEDURE — 99203 OFFICE O/P NEW LOW 30 MIN: CPT

## 2024-04-18 PROCEDURE — 73610 X-RAY EXAM OF ANKLE: CPT | Mod: 50

## 2024-04-22 ENCOUNTER — APPOINTMENT (OUTPATIENT)
Dept: ORTHOPEDIC SURGERY | Facility: CLINIC | Age: 74
End: 2024-04-22

## 2024-04-29 ENCOUNTER — RX RENEWAL (OUTPATIENT)
Age: 74
End: 2024-04-29

## 2024-05-17 ENCOUNTER — RX RENEWAL (OUTPATIENT)
Age: 74
End: 2024-05-17

## 2024-05-17 RX ORDER — TRAZODONE HYDROCHLORIDE 50 MG/1
50 TABLET ORAL
Qty: 90 | Refills: 0 | Status: ACTIVE | COMMUNITY
Start: 2016-10-19 | End: 1900-01-01

## 2024-05-22 ENCOUNTER — APPOINTMENT (OUTPATIENT)
Dept: ORTHOPEDIC SURGERY | Facility: CLINIC | Age: 74
End: 2024-05-22

## 2024-05-30 ENCOUNTER — APPOINTMENT (OUTPATIENT)
Dept: ORTHOPEDIC SURGERY | Facility: CLINIC | Age: 74
End: 2024-05-30
Payer: MEDICARE

## 2024-05-30 VITALS — BODY MASS INDEX: 34.15 KG/M2 | WEIGHT: 200 LBS | HEIGHT: 64 IN

## 2024-05-30 DIAGNOSIS — M48.02 SPINAL STENOSIS, CERVICAL REGION: ICD-10-CM

## 2024-05-30 PROCEDURE — 99215 OFFICE O/P EST HI 40 MIN: CPT

## 2024-05-30 PROCEDURE — 72040 X-RAY EXAM NECK SPINE 2-3 VW: CPT

## 2024-05-30 PROCEDURE — 72100 X-RAY EXAM L-S SPINE 2/3 VWS: CPT

## 2024-05-30 NOTE — DISCUSSION/SUMMARY
[de-identified] : 72 yo female with tandem stenosis, progressive worsening, gait abnormalities, unable to walk more then 1/2 block.  Cervical symptoms are also worsening.  UMN signs, gait ataxia, recommend MRI of C, T, L Spine, RTO for image review.    Diagnosis, prognosis, natural history and treatment was discussed with patient. Patient was advised if the following symptoms develop: chills, fever,  loss of bladder control, bowel incontinence or urinary retention, numbness/tingling or weakness is present in upper or lower extremities, to go to the nearest emergency room. This may be a new clinical condition not present at the time of the patient visit  that may lead to paralysis and/or death, Patient advised if the above symptoms developed to also call the office immediately to inform us and to go to the nearest emergency room.

## 2024-05-30 NOTE — HISTORY OF PRESENT ILLNESS
[Bending] : worsened by bending [Prolonged Sitting] : worsened by prolonged sitting [Prolonged Standing] : worsened by prolonged standing [Sitting] : worsened by sitting [Standing] : worsened by standing [Walking] : worsened by walking [Opioid Analgesics] : relieved by opioid analgesics [de-identified] : Pt with L45, L5S1 Spondylolisthesis, stenosis. Pt presents today for f/u. Symptoms has worsened since last visit. More balance issues. Pt saw Dr Rodriguez (Neuro sx) and C 4-5 ACDF surgery was discussed.  Pt had CT/MRI C spine done. Pt also c/o neck pain for the past 6 months , this radiates to B/L UE to hands associated with numbness,tingling, and  weakness on B/L UE. Low back pain radiates to  B/L hamstring, numbness/tingling  on sole of B/L feet. Pt denies weakness on B/L LE. Pt uses lidocaine patches, Hydrocodone 5mg TID-Q6hrs, these provides some pain relief. pt takes Prednisone 2.5-5mg PRN for pain pt has RA. Pt had LESI X3, last injection on 04/2022, this provided 50% pain relief for 1 day with Dr Medrano. Pt stopped PT on 06/2022 due to pain exacerbation Pt denies fever, chills, weight changes, loss of bladder control, bowel incontinence or urinary retention or saddle anesthesia    [Ataxia] : no ataxia [Incontinence] : no incontinence [Loss of Dexterity] : good dexterity [Urinary Ret.] : no urinary retention [de-identified] : sitting to standing exacerbates pain [de-identified] : Lidoderm patches provides mild pain relief

## 2024-05-30 NOTE — PHYSICAL EXAM
[Antalgic] : antalgic [Motor Strength Upper Extremities] : right (5/5) [NL] : normal and symmetric bilaterally [] : Sensory: [C6-RT] : C6 [C7-RT] : C7 [C8-RT] : C8 [L4-RT] : L4 [L5-RT] : L5 [S1-RT] : S1 [ALL] : dorsalis pedis, posterior tibial, femoral, popliteal, and radial 2+ and symmetric bilaterally [Normal] : Oriented to person, place, and time, insight and judgement were intact and the affect was normal [Motor Strength Lower Extremities] : right (5/5) [Smart's Sign] : negative Smart's sign [Pronator Drift] : negative pronator drift [SLR] : negative straight leg raise [de-identified] : Cervical ROM: Restricted Lumbar ROM: Limited painful TTP C/L spine, b/l paracervicals and b/l paraspinals L region.  Skin intact C/L spine. No rashes, ulcers, blisters.  No lymphedema.  Lumbar ROM: Restrricted TTP L spine and b/l paraspinals lumbar region Skin intact L spine. No rashes, ulcers, blisters.  No lymphedema.  Rapid alternating movements- intact Finger to nose testing- intact Full and non-painful ROM RUE, LUE, RLE and LLE. Skin intact RUE, LUE, RLE and LLE. No rashes, blisters, ulcers. NTTP RUE, LUE, RLE and LLE. No evidence of dislocation or subluxation B/L upper and lower extremities.    [de-identified] : 2 views AP and Lat of Cervical Spine from occipital to C7/T1. Read and dictated by Dr. Mac Peña Board Certified Orthopaedic surgeon  and 3 views AP and Lat, flex/ext of Lumbar Spine from [id number] . Read and dictated by Dr. Mac Peña Board Certified Orthopaedic surgeon 5/30/2024 cervical ddd, L45 and L5S1 Spondylolisthesis

## 2024-06-03 ENCOUNTER — RX RENEWAL (OUTPATIENT)
Age: 74
End: 2024-06-03

## 2024-06-03 ENCOUNTER — APPOINTMENT (OUTPATIENT)
Dept: INTERNAL MEDICINE | Facility: CLINIC | Age: 74
End: 2024-06-03
Payer: MEDICARE

## 2024-06-03 VITALS — DIASTOLIC BLOOD PRESSURE: 82 MMHG | SYSTOLIC BLOOD PRESSURE: 159 MMHG

## 2024-06-03 PROCEDURE — 99213 OFFICE O/P EST LOW 20 MIN: CPT

## 2024-06-03 RX ORDER — LIDOCAINE 5% 700 MG/1
5 PATCH TOPICAL
Qty: 90 | Refills: 0 | Status: COMPLETED | COMMUNITY
Start: 2024-03-15

## 2024-06-03 RX ORDER — CARVEDILOL 6.25 MG/1
6.25 TABLET, FILM COATED ORAL
Qty: 180 | Refills: 1 | Status: DISCONTINUED | COMMUNITY
Start: 2024-01-10 | End: 2024-06-03

## 2024-06-03 RX ORDER — VALACYCLOVIR 500 MG/1
500 TABLET, FILM COATED ORAL
Qty: 3 | Refills: 0 | Status: COMPLETED | COMMUNITY
Start: 2024-05-15

## 2024-06-03 RX ORDER — ACYCLOVIR 50 MG/G
5 CREAM TOPICAL
Qty: 5 | Refills: 0 | Status: COMPLETED | COMMUNITY
Start: 2024-02-08

## 2024-06-03 RX ORDER — CARVEDILOL PHOSPHATE 10 MG/1
10 CAPSULE, EXTENDED RELEASE ORAL
Qty: 90 | Refills: 1 | Status: DISCONTINUED | COMMUNITY
Start: 2024-03-21 | End: 2024-06-03

## 2024-06-03 RX ORDER — CARVEDILOL PHOSPHATE 20 MG/1
20 CAPSULE, EXTENDED RELEASE ORAL
Qty: 90 | Refills: 0 | Status: ACTIVE | COMMUNITY
Start: 2024-06-03 | End: 1900-01-01

## 2024-06-03 NOTE — PLAN
[FreeTextEntry1] : HTN WIll increase carvedilol to 20 mg. She has a hard time with compliance with a bid medication Continue amlodipine 10 mg and Triamterene/HCTZ 37.6/25 Monitor BP at home   Rheumatoid Arthritis Continue Plaquenil and Leflunomide per rheumatology  HYperlipidemia Continue SImvastatin  f/u for physical

## 2024-06-03 NOTE — PHYSICAL EXAM
[No Acute Distress] : no acute distress [Well Nourished] : well nourished [Normal Voice/Communication] : normal voice/communication [No Respiratory Distress] : no respiratory distress  [Normal Affect] : the affect was normal [Normal Insight/Judgement] : insight and judgment were intact

## 2024-06-03 NOTE — HISTORY OF PRESENT ILLNESS
[Home] : at home, [unfilled] , at the time of the visit. [Other Location: e.g. Home (Enter Location, City,State)___] : at [unfilled] [Verbal consent obtained from patient] : the patient, [unfilled] [FreeTextEntry1] : f/u BP [de-identified] : 74 y/o female with h/o RA< borderline diabetes and HTN who presents for follow up. For BP she is on amlodipine 10 mg ,Triamterene/HCTZ 37.5/25 and coreg 6.25 mg bid. Last visit Coreg was changed to ER 10mg. However at her last refill she was given the bid Coreg. SHe has been checkin er BP at home and it is consistently in the upper 150s/70s-80s. She denies headache, chest pain, palpitations, and SOB

## 2024-06-07 ENCOUNTER — APPOINTMENT (OUTPATIENT)
Dept: RHEUMATOLOGY | Facility: CLINIC | Age: 74
End: 2024-06-07
Payer: MEDICARE

## 2024-06-07 ENCOUNTER — APPOINTMENT (OUTPATIENT)
Dept: ENDOCRINOLOGY | Facility: CLINIC | Age: 74
End: 2024-06-07
Payer: MEDICARE

## 2024-06-07 VITALS
HEIGHT: 64 IN | DIASTOLIC BLOOD PRESSURE: 91 MMHG | HEART RATE: 78 BPM | RESPIRATION RATE: 16 BRPM | OXYGEN SATURATION: 97 % | BODY MASS INDEX: 37.22 KG/M2 | WEIGHT: 218 LBS | SYSTOLIC BLOOD PRESSURE: 155 MMHG

## 2024-06-07 VITALS
BODY MASS INDEX: 37.22 KG/M2 | OXYGEN SATURATION: 98 % | HEIGHT: 64 IN | SYSTOLIC BLOOD PRESSURE: 152 MMHG | HEART RATE: 72 BPM | WEIGHT: 218 LBS | DIASTOLIC BLOOD PRESSURE: 80 MMHG

## 2024-06-07 DIAGNOSIS — R60.0 LOCALIZED EDEMA: ICD-10-CM

## 2024-06-07 DIAGNOSIS — E04.1 NONTOXIC SINGLE THYROID NODULE: ICD-10-CM

## 2024-06-07 DIAGNOSIS — R44.9 UNSPECIFIED SYMPTOMS AND SIGNS INVOLVING GENERAL SENSATIONS AND PERCEPTIONS: ICD-10-CM

## 2024-06-07 DIAGNOSIS — M06.9 RHEUMATOID ARTHRITIS, UNSPECIFIED: ICD-10-CM

## 2024-06-07 DIAGNOSIS — R73.03 PREDIABETES.: ICD-10-CM

## 2024-06-07 DIAGNOSIS — E01.0 IODINE-DEFICIENCY RELATED DIFFUSE (ENDEMIC) GOITER: ICD-10-CM

## 2024-06-07 PROCEDURE — 99215 OFFICE O/P EST HI 40 MIN: CPT

## 2024-06-07 PROCEDURE — G2211 COMPLEX E/M VISIT ADD ON: CPT

## 2024-06-07 RX ORDER — LEFLUNOMIDE 20 MG/1
20 TABLET, FILM COATED ORAL
Qty: 90 | Refills: 2 | Status: ACTIVE | COMMUNITY
Start: 2021-05-05 | End: 1900-01-01

## 2024-06-07 RX ORDER — PREDNISONE 5 MG/1
5 TABLET ORAL
Qty: 60 | Refills: 1 | Status: ACTIVE | COMMUNITY
Start: 2024-06-07 | End: 1900-01-01

## 2024-06-07 NOTE — PHYSICAL EXAM
[General Appearance - Alert] : alert [General Appearance - In No Acute Distress] : in no acute distress [Sclera] : the sclera and conjunctiva were normal [Extraocular Movements] : extraocular movements were intact [Outer Ear] : the ears and nose were normal in appearance [Oropharynx] : the oropharynx was normal [Neck Appearance] : the appearance of the neck was normal [Skin Color & Pigmentation] : normal skin color and pigmentation [Oriented To Time, Place, And Person] : oriented to person, place, and time [Affect] : the affect was normal [Impaired Insight] : insight and judgment were intact [Mood] : the mood was normal [FreeTextEntry1] : dry skin on elbows.

## 2024-06-07 NOTE — PHYSICAL EXAM
[TextEntry] : PHYSICAL EXAMINATION: Vital signs from today's encounter reviewed.  GENERAL: No acute distress, clinically eukinetic, normal appearance HEAD: Normocephalic, atraumatic EYES: conjunctivae are pink and moist, no icterus, no proptosis  NECK: Palpable thyroid isthmus nodule, non-tender, no adenopathy CARDIOVASCULAR: well-perfused extremities, no peripheral edema RESPIRATORY: normal chest expansion with good pulmonary effort, no acute respiratory distress MUSCULOSKELETAL: no swelling, normal range of motion, normal gait SKIN: no pallor, no icterus, no rash  NEUROLOGIC: alert and oriented, no evident focal deficits, no tremors  PSYCHIATRIC: mood and affect are normal

## 2024-06-07 NOTE — DATA REVIEWED
[FreeTextEntry1] : Laboratory and radiology studies that were personally reviewed at the visit on 02/28/2020 are summarized below: US hands ()  - no IA , + OA  CT angio card (8-3-2023):   Non obstructive disease of the visualized epicardial coronary arteries by cardiac CTA as detailed above 2. Agatston calcium score of 0. 3. Incomplete opacification of the left atrial appendage (JACQUELINE), may represent JACQUELINE thrombus vs slow flow, recommend dedicated cardiac imaging as clinically indicated. 4. Small left to right trans-atrial flow jet suggestive of small patent foramen ovale vs atrial septal defect. Possible atrial septal aneurysm. 5. Borderline left ventricular wall thickness. Atrial are qualitatively enlarged, recommend correlation with echocardiogram as clinically indicated.  Shoulder (7-7-2023): No fractures, dislocations, or AC separation. Left AC joint arthrosis. Right more conspicuous than left degenerative spurring projecting from inferior humeral head articular margins. Right greater tuberosity enthesopathic changes correlate with degree of underlying rotator cuff abnormality helpful to further assess in this regard as warranted. Generalized osteopenia otherwise no discrete lytic or blastic lesions. No periarticular soft tissue calcifications.  Cards (6-6-23): CP with ALVAREZ and PVC's and pending CT coronary to rule out obstrutive CAD Peña note (3-2023):  L45 L5S1 spondylolisthesis and stenosis  - needs anterior cervical discectomy and fusion of C45 MRI LS SPine (6-16-22):  grade 1 anterolisthesis of L4-:5, multilevel djd of the L spine with mild spinal canal and moderate to severe neuroforaminal stenosis .  7-28-22:  US hands - right hand:  small radiocarpal effusion without active synovitis, no tenosynovitis in  left hand:  moderate arthrosis at the interphalangeal joint of the thumb with mild active synovitis.  no tenosynovitis.    3-2020:  MRI L spine - DJD and disc bulging  :  X-ray:  hand/wrist/foot/ankle: OA changes  :  DEXA - WNL  9-13-19  Cspine xray  which showed DJD  7-3-19:  CT chest with near complete resolution of the nodular opacity in the RUL

## 2024-06-07 NOTE — HISTORY OF PRESENT ILLNESS
[RF] : rheumatoid factor [Morning Stiffness] : morning stiffness [Fatigue] : fatigue [Joint Swelling] : joint swelling [Joint Pain] : joint pain [TextBox_70] : pain and swelling in bilateral hands/wrists  ptga = 8 [FreeTextEntry1] : cavitary pna in 2019 and stopped all ra meds other than HCQ history of diverticulosis   OA  Structural back and neck disease  -> deferred surgery of lower back last year  given presence of neck disease  ->  back disease has been worsening the past month and she feels it affects her walking  ->  spondylisthesis  ->  cervical myelopathy  -> working with orthopedics (Yi Peña) and pending MRI C-T-L spine    RA - on HCQ and arava  ->  feet and ankle have been swelling- saw Dr. Escobar who diagnosed OA  (note from 4-2024 reviewed - mild synovial thickening on bilateral ankles and xray with OA)  ->  hands get swollen and ankles get swollen.  ->  worsening nodules on hands   no abdominal pain  Granddaughter with medical issues with hidradenitis and multiple hospitalization   [CCP] : negative CCP antibody [Erosions] : no erosions [Rash] : no rash [Shortness of Breath] : no shortness of breath [Ocular Symptoms] : no ocular symptoms [Dysphonia] : no dysphonia [Chest Pain] : no chest pain [TextBox_2] : 2014 (Dr. Vicente) [TextBox_38] : arava (2009-2/2019 stopped due to infection) [TextBox_40] : HCQ, arava 20mg daily  [TextBox_42] : enbrel (8939-1973). humira (2016-2-17) Xeljanz (2-2017-3/2-17 bowel issues and history of diverticulosis) cimzia (11/2017-6-2018), orencia (8-2018 to 11/2018) simponi (-2/2019 - stopped due to infection)

## 2024-06-07 NOTE — ASSESSMENT
[FreeTextEntry1] : 1.  Multiple thyroid nodules Most recent thyroid US 12/16/2022: RMP 2.2 cm spongiform, LLP 1 cm spongiform, isthmus 2.8 cm TR 3 nodule. Dominant isthmus nodule has been FNA benign multiple times. RECOMMENDATIONS: -Recheck thyroid US, prescription provided -She has some compressive symptoms such as dysphagia, choking/gagging sensation.  We discussed possibility of thyroidectomy versus RFA for nodule size reduction.  She may be interested in RFA, reading material provided.  She will let us know if she would like to proceed so we can provide a referral. -Check TFTs  2.  Prediabetes 3.  Obesity A1c 6% from 1/2024 -Continue lifestyle modifications for diabetes prevention and weight loss such as dietary modification and increased physical exercise -We discussed using medications for diabetes/weight loss such as metformin or GLP-1 analogs.  She is interested in starting GLP-1 analog if covered by insurance, can decide after A1c results.  RTC in 1 year for thyroid nodule follow-up.  Sooner if starting new medication for diabetes/weight loss.  Madeline Bullard MD [facility] at [address], [address] Ph: [phone] Fax: [phone]

## 2024-06-07 NOTE — ASSESSMENT
[FreeTextEntry1] : ISI CRONIN is a 73 year  old female, seen on today  for Seropositive non erosive RA and diverticulitis + active RA i structual disease of the back  DIfficulty with perception and walking into walls   1. Seropositive non erosive RA with pain and swelling in toes. US shows tendinopathy and synovitis. + history of diverticula on Plaquenil and arava [] US hands in 7-2022: mild active synovitis at the base of thumb otherwise quiescent. and  US without acute inflammatory changes  [] continue HCQ and follows with optho Q 6 months [] check xrays today given worsening nodular disease  [] Will check laboratory tests to look for markers of disease activity and also to assess for medication toxicity. [] continue HCQ and arava (follow up ophtho) [] considering adding a bdmard but need to complete workup of spine disease and proprioception abnormality first  [] pred 5mg PO BID and 1 month follow up   2.  OA s/p b/l knee replacements and DJD of spine and neck. On Naprosyn from PMD. Needs to follow up orthopedics for neck/spine / Surgical planning [] MRI c spine from 2-2023 without Atlantoaxial involvement [] ok to continue arava in perioperative period. [] needs medical and cardiology clearance prior to surgery [] follow up imaging ordered by  ortho  []  continue with orthopedics and pain management (hydrocodone per pain management)   3. bone health - [] 10-8-2019 normal and 3-2022 normal. check again in 3 years.(3-2025) [] continue calcium and vitamin d  4. Vitamin D insufficiency [] continue supplement [] re-check today  5.  Proprioception abnormality  [] brain MRI ordered    Today's medical care services serve as the continuing focal point for needed health care services that are part of ongoing care related to a patient's single, serious condition or a complex condition.   Time spent on the encounter included but is not limited to, preparing to see the patient, obtaining and/or reviewing separately obtained history, performing the evaluation, counseling and educating, independently interpreting results with communication to the patient, order placement, referring and/or communicating with other health professionals as described, and documenting clinical information in the electronic health record.   More than 50% of the encounter was spent counseling ISI CRONIN on the differential, workup, disease course, and treatment/management.   Education was provided to ISI CRONIN during this encounter. All questions and concerns were answered and addressed in detail.  ISI CRONIN verbalized understanding and agreed to the plan.   ISI CRONIN has been instructed to call for an earlier appointment if new symptoms develop in the interim. ISI CRONIN has been instructed to make a follow-up appointment in 1 month

## 2024-06-07 NOTE — HISTORY OF PRESENT ILLNESS
[FreeTextEntry1] : CHIEF COMPLAINT: Thyroid nodule Former patient of Dr. Abbott, Dr. Fernández.  New to me.   HISTORY OF PRESENTING ILLNESS: The patient is a 73-year-old female being seen in the office today for evaluation of thyroid nodule.  Also has prediabetes, obesity, hypertension.  Also has rheumatoid arthritis, follows with Dr. Gisele Barrow.  She was diagnosed with thyroid nodules in 2011, feels that nodules have increased in size over time (also noted on ultrasounds).  She has had multiple FNA of isthmus TR 3 nodule, which have been benign - most recently in 1/2023 and 1/2020. Most recent thyroid US 12/16/2022: RMP 2.2 cm spongiform, LLP 1 cm spongiform, isthmus 2.8 cm TR 3 nodule.   She has a remote history of being on Synthroid for around 1 year, many years ago.  Currently not on any thyroid medication. Symptoms: No palpitations/tremors, no lower extremity swelling, no constipation/diarrhea.  Endorses some heat intolerance/hot flashes.  She has a visible/palpable anterior neck swelling.  Endorses increasing cough over time, needing to clear her throat.  Reports that she has dysphagia, feels a choking sensation/gagging when eating/swallowing.  She has a history of ANDRIY, uses CPAP at night.  Denies shortness of breath.  Notices that her voice has become deeper/hoarse over the years.   Family history: Sister with thyroidectomy, no known history of thyroid cancer in family. Radiation exposure: She is a retired x-ray technician, worked as an x-ray tech for 20 years.   Home medications: Trazodone, hydroxychloroquine, Arava, Coreg, pantoprazole, triamterene/hydrochlorothiazide, amlodipine. Previously used to be on statin, now discontinued.  Prediabetes: A1c 6% from 1/2024, has never been on diabetes medications.  Working on lifestyle modifications.

## 2024-06-08 LAB
ESTIMATED AVERAGE GLUCOSE: 123 MG/DL
HBA1C MFR BLD HPLC: 5.9 %
T3 SERPL-MCNC: 168 NG/DL
T4 FREE SERPL-MCNC: 1.4 NG/DL
TSH SERPL-ACNC: 0.79 UIU/ML

## 2024-06-09 ENCOUNTER — TRANSCRIPTION ENCOUNTER (OUTPATIENT)
Age: 74
End: 2024-06-09

## 2024-06-09 LAB
ALBUMIN SERPL ELPH-MCNC: 4.4 G/DL
ALP BLD-CCNC: 60 U/L
ALT SERPL-CCNC: 20 U/L
ANION GAP SERPL CALC-SCNC: 14 MMOL/L
APPEARANCE: CLEAR
AST SERPL-CCNC: 26 U/L
BACTERIA: NEGATIVE /HPF
BASOPHILS # BLD AUTO: 0.03 K/UL
BASOPHILS NFR BLD AUTO: 0.6 %
BILIRUB SERPL-MCNC: 0.3 MG/DL
BILIRUBIN URINE: NEGATIVE
BLOOD URINE: NEGATIVE
BUN SERPL-MCNC: 18 MG/DL
CALCIUM SERPL-MCNC: 10.2 MG/DL
CAST: 0 /LPF
CHLORIDE SERPL-SCNC: 101 MMOL/L
CO2 SERPL-SCNC: 25 MMOL/L
COLOR: YELLOW
CREAT SERPL-MCNC: 0.75 MG/DL
CREAT SPEC-SCNC: 99 MG/DL
CREAT/PROT UR: 0.2 RATIO
CRP SERPL-MCNC: <3 MG/L
EGFR: 83 ML/MIN/1.73M2
EOSINOPHIL # BLD AUTO: 0.1 K/UL
EOSINOPHIL NFR BLD AUTO: 2.2 %
EPITHELIAL CELLS: 1 /HPF
ERYTHROCYTE [SEDIMENTATION RATE] IN BLOOD BY WESTERGREN METHOD: 64 MM/HR
GLUCOSE QUALITATIVE U: NEGATIVE MG/DL
GLUCOSE SERPL-MCNC: 98 MG/DL
HAV IGM SER QL: NONREACTIVE
HBV CORE IGG+IGM SER QL: NONREACTIVE
HBV CORE IGM SER QL: NONREACTIVE
HBV SURFACE AG SER QL: NONREACTIVE
HCT VFR BLD CALC: 39.6 %
HCV AB SER QL: NONREACTIVE
HCV S/CO RATIO: 0.13 S/CO
HGB BLD-MCNC: 12.7 G/DL
IMM GRANULOCYTES NFR BLD AUTO: 0.2 %
KETONES URINE: NEGATIVE MG/DL
LEUKOCYTE ESTERASE URINE: NEGATIVE
LYMPHOCYTES # BLD AUTO: 0.96 K/UL
LYMPHOCYTES NFR BLD AUTO: 20.7 %
MAN DIFF?: NORMAL
MCHC RBC-ENTMCNC: 27.5 PG
MCHC RBC-ENTMCNC: 32.1 GM/DL
MCV RBC AUTO: 85.7 FL
MICROSCOPIC-UA: NORMAL
MONOCYTES # BLD AUTO: 0.39 K/UL
MONOCYTES NFR BLD AUTO: 8.4 %
NEUTROPHILS # BLD AUTO: 3.14 K/UL
NEUTROPHILS NFR BLD AUTO: 67.9 %
NITRITE URINE: NEGATIVE
PH URINE: 5.5
PLATELET # BLD AUTO: 257 K/UL
POTASSIUM SERPL-SCNC: 3.7 MMOL/L
PROT SERPL-MCNC: 7.1 G/DL
PROT UR-MCNC: 15 MG/DL
PROTEIN URINE: NORMAL MG/DL
RBC # BLD: 4.62 M/UL
RBC # FLD: 15.3 %
RED BLOOD CELLS URINE: 1 /HPF
SODIUM SERPL-SCNC: 140 MMOL/L
SPECIFIC GRAVITY URINE: 1.02
UROBILINOGEN URINE: 0.2 MG/DL
WBC # FLD AUTO: 4.63 K/UL
WHITE BLOOD CELLS URINE: 1 /HPF

## 2024-06-10 LAB
M TB IFN-G BLD-IMP: NEGATIVE
QUANTIFERON TB PLUS MITOGEN MINUS NIL: 4.58 IU/ML
QUANTIFERON TB PLUS NIL: 0.02 IU/ML
QUANTIFERON TB PLUS TB1 MINUS NIL: 0.02 IU/ML
QUANTIFERON TB PLUS TB2 MINUS NIL: 0 IU/ML

## 2024-06-10 RX ORDER — SEMAGLUTIDE 0.25 MG/.5ML
0.25 INJECTION, SOLUTION SUBCUTANEOUS
Qty: 1 | Refills: 0 | Status: ACTIVE | COMMUNITY
Start: 2024-06-10 | End: 1900-01-01

## 2024-06-12 ENCOUNTER — APPOINTMENT (OUTPATIENT)
Dept: ULTRASOUND IMAGING | Facility: CLINIC | Age: 74
End: 2024-06-12
Payer: MEDICARE

## 2024-06-12 ENCOUNTER — APPOINTMENT (OUTPATIENT)
Dept: MRI IMAGING | Facility: CLINIC | Age: 74
End: 2024-06-12
Payer: MEDICARE

## 2024-06-12 ENCOUNTER — OUTPATIENT (OUTPATIENT)
Dept: OUTPATIENT SERVICES | Facility: HOSPITAL | Age: 74
LOS: 1 days | End: 2024-06-12
Payer: MEDICARE

## 2024-06-12 DIAGNOSIS — Z00.8 ENCOUNTER FOR OTHER GENERAL EXAMINATION: ICD-10-CM

## 2024-06-12 PROCEDURE — 72141 MRI NECK SPINE W/O DYE: CPT

## 2024-06-12 PROCEDURE — 72146 MRI CHEST SPINE W/O DYE: CPT | Mod: 26

## 2024-06-12 PROCEDURE — 72148 MRI LUMBAR SPINE W/O DYE: CPT | Mod: 26

## 2024-06-12 PROCEDURE — 72148 MRI LUMBAR SPINE W/O DYE: CPT

## 2024-06-12 PROCEDURE — 72146 MRI CHEST SPINE W/O DYE: CPT

## 2024-06-12 PROCEDURE — 76536 US EXAM OF HEAD AND NECK: CPT | Mod: 26

## 2024-06-12 PROCEDURE — 72141 MRI NECK SPINE W/O DYE: CPT | Mod: 26

## 2024-06-12 PROCEDURE — 76536 US EXAM OF HEAD AND NECK: CPT

## 2024-06-24 ENCOUNTER — RX RENEWAL (OUTPATIENT)
Age: 74
End: 2024-06-24

## 2024-06-27 ENCOUNTER — TRANSCRIPTION ENCOUNTER (OUTPATIENT)
Age: 74
End: 2024-06-27

## 2024-06-27 ENCOUNTER — APPOINTMENT (OUTPATIENT)
Dept: INTERNAL MEDICINE | Facility: CLINIC | Age: 74
End: 2024-06-27
Payer: MEDICARE

## 2024-06-27 VITALS
WEIGHT: 218 LBS | HEIGHT: 64 IN | OXYGEN SATURATION: 96 % | DIASTOLIC BLOOD PRESSURE: 90 MMHG | BODY MASS INDEX: 37.22 KG/M2 | SYSTOLIC BLOOD PRESSURE: 144 MMHG | HEART RATE: 67 BPM

## 2024-06-27 DIAGNOSIS — I10 ESSENTIAL (PRIMARY) HYPERTENSION: ICD-10-CM

## 2024-06-27 DIAGNOSIS — R60.0 LOCALIZED EDEMA: ICD-10-CM

## 2024-06-27 DIAGNOSIS — M25.471 EFFUSION, RIGHT ANKLE: ICD-10-CM

## 2024-06-27 DIAGNOSIS — E66.9 OBESITY, UNSPECIFIED: ICD-10-CM

## 2024-06-27 DIAGNOSIS — M25.472 EFFUSION, LEFT ANKLE: ICD-10-CM

## 2024-06-27 PROCEDURE — G2211 COMPLEX E/M VISIT ADD ON: CPT

## 2024-06-27 PROCEDURE — 99214 OFFICE O/P EST MOD 30 MIN: CPT

## 2024-07-01 ENCOUNTER — APPOINTMENT (OUTPATIENT)
Dept: ORTHOPEDIC SURGERY | Facility: CLINIC | Age: 74
End: 2024-07-01
Payer: MEDICARE

## 2024-07-01 VITALS — BODY MASS INDEX: 37.22 KG/M2 | HEIGHT: 64 IN | WEIGHT: 218 LBS

## 2024-07-01 DIAGNOSIS — M48.07 SPINAL STENOSIS, LUMBOSACRAL REGION: ICD-10-CM

## 2024-07-01 DIAGNOSIS — M43.16 SPONDYLOLISTHESIS, LUMBAR REGION: ICD-10-CM

## 2024-07-01 PROCEDURE — 99214 OFFICE O/P EST MOD 30 MIN: CPT

## 2024-07-03 ENCOUNTER — APPOINTMENT (OUTPATIENT)
Dept: RADIOLOGY | Facility: CLINIC | Age: 74
End: 2024-07-03

## 2024-07-03 ENCOUNTER — APPOINTMENT (OUTPATIENT)
Dept: MRI IMAGING | Facility: CLINIC | Age: 74
End: 2024-07-03

## 2024-07-05 ENCOUNTER — APPOINTMENT (OUTPATIENT)
Dept: ORTHOPEDIC SURGERY | Facility: CLINIC | Age: 74
End: 2024-07-05

## 2024-07-08 ENCOUNTER — APPOINTMENT (OUTPATIENT)
Dept: INTERNAL MEDICINE | Facility: CLINIC | Age: 74
End: 2024-07-08
Payer: MEDICARE

## 2024-07-08 VITALS
HEART RATE: 68 BPM | WEIGHT: 218 LBS | OXYGEN SATURATION: 95 % | HEIGHT: 64 IN | DIASTOLIC BLOOD PRESSURE: 89 MMHG | BODY MASS INDEX: 37.22 KG/M2 | SYSTOLIC BLOOD PRESSURE: 163 MMHG

## 2024-07-08 DIAGNOSIS — I10 ESSENTIAL (PRIMARY) HYPERTENSION: ICD-10-CM

## 2024-07-08 DIAGNOSIS — M25.473 EFFUSION, UNSPECIFIED ANKLE: ICD-10-CM

## 2024-07-08 PROCEDURE — G2211 COMPLEX E/M VISIT ADD ON: CPT

## 2024-07-08 PROCEDURE — 99213 OFFICE O/P EST LOW 20 MIN: CPT

## 2024-07-10 ENCOUNTER — APPOINTMENT (OUTPATIENT)
Dept: INTERNAL MEDICINE | Facility: CLINIC | Age: 74
End: 2024-07-10

## 2024-07-11 ENCOUNTER — RX RENEWAL (OUTPATIENT)
Age: 74
End: 2024-07-11

## 2024-07-11 ENCOUNTER — APPOINTMENT (OUTPATIENT)
Dept: INTERNAL MEDICINE | Facility: CLINIC | Age: 74
End: 2024-07-11
Payer: MEDICARE

## 2024-07-11 ENCOUNTER — APPOINTMENT (OUTPATIENT)
Dept: RHEUMATOLOGY | Facility: CLINIC | Age: 74
End: 2024-07-11

## 2024-07-11 DIAGNOSIS — U07.1 COVID-19: ICD-10-CM

## 2024-07-11 PROCEDURE — 99214 OFFICE O/P EST MOD 30 MIN: CPT

## 2024-07-11 PROCEDURE — G2211 COMPLEX E/M VISIT ADD ON: CPT

## 2024-07-11 RX ORDER — MOLNUPIRAVIR 200 MG/1
200 CAPSULE ORAL
Qty: 40 | Refills: 0 | Status: ACTIVE | COMMUNITY
Start: 2024-07-11 | End: 1900-01-01

## 2024-07-15 ENCOUNTER — RX RENEWAL (OUTPATIENT)
Age: 74
End: 2024-07-15

## 2024-07-25 ENCOUNTER — APPOINTMENT (OUTPATIENT)
Dept: INTERNAL MEDICINE | Facility: CLINIC | Age: 74
End: 2024-07-25
Payer: MEDICARE

## 2024-07-25 VITALS
OXYGEN SATURATION: 98 % | DIASTOLIC BLOOD PRESSURE: 80 MMHG | HEIGHT: 64 IN | SYSTOLIC BLOOD PRESSURE: 142 MMHG | HEART RATE: 61 BPM | WEIGHT: 218 LBS | BODY MASS INDEX: 37.22 KG/M2

## 2024-07-25 DIAGNOSIS — J20.8 COVID-19: ICD-10-CM

## 2024-07-25 DIAGNOSIS — U07.1 COVID-19: ICD-10-CM

## 2024-07-25 DIAGNOSIS — I10 ESSENTIAL (PRIMARY) HYPERTENSION: ICD-10-CM

## 2024-07-25 PROCEDURE — 99214 OFFICE O/P EST MOD 30 MIN: CPT

## 2024-07-25 PROCEDURE — G2211 COMPLEX E/M VISIT ADD ON: CPT

## 2024-07-25 RX ORDER — FLUTICASONE PROPIONATE 50 UG/1
50 SPRAY, METERED NASAL
Qty: 1 | Refills: 2 | Status: ACTIVE | COMMUNITY
Start: 2024-07-25 | End: 1900-01-01

## 2024-07-25 RX ORDER — AMOXICILLIN AND CLAVULANATE POTASSIUM 875; 125 MG/1; MG/1
875-125 TABLET, COATED ORAL TWICE DAILY
Qty: 14 | Refills: 0 | Status: ACTIVE | COMMUNITY
Start: 2024-07-25 | End: 1900-01-01

## 2024-07-25 NOTE — REVIEW OF SYSTEMS
[Nasal Discharge] : nasal discharge [Sore Throat] : sore throat [Headache] : headache [Postnasal Drip] : postnasal drip [Cough] : cough [Fever] : no fever [Chest Pain] : no chest pain [Shortness Of Breath] : no shortness of breath [Abdominal Pain] : no abdominal pain [Dysuria] : no dysuria [Joint Pain] : no joint pain [Itching] : no itching [Insomnia] : no insomnia [Swollen Glands] : no swollen glands [FreeTextEntry4] : sinus pressure [FreeTextEntry6] : with mucosa production

## 2024-07-25 NOTE — HISTORY OF PRESENT ILLNESS
[FreeTextEntry1] : Follow up visit [de-identified] : 72 y/o female with h/o rheumatoid arthritis, borderline diabetes, hypertension, presenting with follow up; recently had COVID starting July 10 for the past 2 weeks of productive cough- had a telehealth visit , recommend to start with Lagevrio but was not approved, , still has some cough and mucous- . At urgent care she was given cough suppressant which she didn't take. Came today with c/o   headaches and nasal congestion., sinus pressure  Denies fevers, chills, chest pain, SOB, abdominal pain Also found to have today uncontrolled HTN- as per patient - stopped Amlodipine as she had ankle swelling - resolved swelling  currently on  Carvedilol 25 mg BID Triamterene/HCTZ 37.5/25 mg QD Patient never started with Hydralazine 25 mg TID as directed before

## 2024-07-25 NOTE — PHYSICAL EXAM
[No Acute Distress] : no acute distress [Normal Sclera/Conjunctiva] : normal sclera/conjunctiva [Normal Outer Ear/Nose] : the outer ears and nose were normal in appearance [No JVD] : no jugular venous distention [No Respiratory Distress] : no respiratory distress  [Normal Rate] : normal rate  [No Carotid Bruits] : no carotid bruits [No Edema] : there was no peripheral edema [Soft] : abdomen soft [Non Tender] : non-tender [No HSM] : no HSM [Normal Bowel Sounds] : normal bowel sounds [Normal Anterior Cervical Nodes] : no anterior cervical lymphadenopathy [No CVA Tenderness] : no CVA  tenderness [No Joint Swelling] : no joint swelling [No Rash] : no rash [Coordination Grossly Intact] : coordination grossly intact [Normal Insight/Judgement] : insight and judgment were intact [Well Nourished] : well nourished [No Accessory Muscle Use] : no accessory muscle use [de-identified] : nasal mucosa- swallen, with discharge [de-identified] : cough with deep breath, some rhonchi

## 2024-07-25 NOTE — ASSESSMENT
[FreeTextEntry1] : Come back for Annual physical exam next month  Patient agreed and verbalized understanding of above

## 2024-07-30 ENCOUNTER — RX RENEWAL (OUTPATIENT)
Age: 74
End: 2024-07-30

## 2024-08-12 ENCOUNTER — APPOINTMENT (OUTPATIENT)
Dept: INTERNAL MEDICINE | Facility: CLINIC | Age: 74
End: 2024-08-12
Payer: MEDICARE

## 2024-08-12 VITALS
OXYGEN SATURATION: 96 % | HEART RATE: 63 BPM | SYSTOLIC BLOOD PRESSURE: 147 MMHG | DIASTOLIC BLOOD PRESSURE: 81 MMHG | BODY MASS INDEX: 36.54 KG/M2 | HEIGHT: 64 IN | WEIGHT: 214 LBS

## 2024-08-12 DIAGNOSIS — E78.00 PURE HYPERCHOLESTEROLEMIA, UNSPECIFIED: ICD-10-CM

## 2024-08-12 PROCEDURE — 99213 OFFICE O/P EST LOW 20 MIN: CPT

## 2024-08-12 PROCEDURE — G2211 COMPLEX E/M VISIT ADD ON: CPT

## 2024-08-12 NOTE — PHYSICAL EXAM
[TextEntry] : General: alert, awake, oriented  X 3, Neck: Supple, no masses,  Chest: Lungs are  clear b/l , no rales, no rhonchi, no wheezes.  Heart: RR, no murmurs, no rubs, no gallops.  Extremities: FROM, no deformities, no edema, no erythema.

## 2024-08-12 NOTE — HISTORY OF PRESENT ILLNESS
[FreeTextEntry8] : 72 y/o female with h/o rheumatoid arthritis, borderline diabetes, hypertension,  Came today with uncontrolled HTN - as per patient her BP readings over the last 2 wks- 170/90 Currently on Carvedilol 25 mg BID Triamterene/HCTZ 37.5/25 mg QD Hydralazine 25 mg TID

## 2024-08-22 ENCOUNTER — APPOINTMENT (OUTPATIENT)
Dept: INTERNAL MEDICINE | Facility: CLINIC | Age: 74
End: 2024-08-22

## 2024-08-22 ENCOUNTER — NON-APPOINTMENT (OUTPATIENT)
Age: 74
End: 2024-08-22

## 2024-08-22 ENCOUNTER — APPOINTMENT (OUTPATIENT)
Dept: CARDIOLOGY | Facility: CLINIC | Age: 74
End: 2024-08-22
Payer: MEDICARE

## 2024-08-22 VITALS
DIASTOLIC BLOOD PRESSURE: 92 MMHG | SYSTOLIC BLOOD PRESSURE: 150 MMHG | HEART RATE: 66 BPM | HEIGHT: 64 IN | WEIGHT: 214 LBS | BODY MASS INDEX: 36.54 KG/M2 | OXYGEN SATURATION: 97 %

## 2024-08-22 DIAGNOSIS — I10 ESSENTIAL (PRIMARY) HYPERTENSION: ICD-10-CM

## 2024-08-22 DIAGNOSIS — R06.09 OTHER FORMS OF DYSPNEA: ICD-10-CM

## 2024-08-22 DIAGNOSIS — I51.9 HEART DISEASE, UNSPECIFIED: ICD-10-CM

## 2024-08-22 PROCEDURE — G2211 COMPLEX E/M VISIT ADD ON: CPT

## 2024-08-22 PROCEDURE — 93000 ELECTROCARDIOGRAM COMPLETE: CPT

## 2024-08-22 PROCEDURE — 99214 OFFICE O/P EST MOD 30 MIN: CPT

## 2024-08-22 RX ORDER — LOSARTAN POTASSIUM 50 MG/1
50 TABLET, FILM COATED ORAL DAILY
Qty: 90 | Refills: 1 | Status: ACTIVE | COMMUNITY
Start: 2024-08-22 | End: 1900-01-01

## 2024-08-22 NOTE — HISTORY OF PRESENT ILLNESS
[FreeTextEntry1] : 73F with HTN, RA, preDM, mild DD who presents for follow up  BP continues to run high, BP meds being adjusted Amlodipine d/c'ed due to LE edema Hydralazine started, causing headaches. Did not have headaches before taking hydralazine  BP remains elevated Also noting increased dyspnea with exertion, more than she is accustomed to  No CP, LE edema has improved since stopping amlodipine   HISTORY:  Went for coronary CT, JACQUELIEN with incomplete opacification MANDY without thrombus, small PFO with L to R shunt  Beta blocker d/c'ed due to bradycardia in past- now back on, ACEi d/c'ed due excessive salivation and drooling   Negative amyloid scan (DD, atrial enlargement, bilateral carpal tunnel)   Sister passed away from CHF, may have been secondary to chemo. Lost  in April '20 to COVID Remote smoking hx (quit 40+ years ago). Retired X-ray tech.   ECG: Sinus arrhythmia with PAC TTE 2023:  1. Normal left ventricular size, wall thickness, wall motion, and systolic function. Left ventricular ejection fraction is visually estimated at 60 to 65%. 2. The left ventricular volumes are mildly increased. 3. Mild (Grade 1) left ventricular diastolic dysfunction. 4. Normal right ventricular size and systolic function. 5. The left atrium is normal in size. 6. Mild thickening of the aortic valve leaflets. Mild aortic stenosis. 7. PASP at least 40 mmHg (Pk TR gradient 37, no IVC)   EST 5/2021: DTS 7, no ischemia   CTA 8/2023:  1. Non obstructive disease of the visualized epicardial coronary arteries by cardiac CTA as detailed above 2. Agatston calcium score of 0. 3. Incomplete opacification of the left atrial appendage (JACQUELINE), may represent JACQUELINE thrombus vs slow flow, recommend dedicated cardiac imaging as clinically indicated. 4. Small left to right trans-atrial flow jet suggestive of small patent foramen ovale vs atrial septal defect. Possible atrial septal aneurysm. 5. Borderline left ventricular wall thickness. Atrial are qualitatively enlarged, recommend correlation with echocardiogram as clinically indicated.  MANDY 2023: Normal LV function, ASA with small ASD .  Triamterene-HCTZ 37-25mg Carvedilol 25mg daily  Hydralazine 50mg TID (d/c) Start losartan 50mg

## 2024-08-22 NOTE — DISCUSSION/SUMMARY
[FreeTextEntry1] : 73F  HTN: Headaches from hydralazine, BP not well controlled  Excessive drooling from ACEi, never tried ARB  D/c hydralazine Start losartan 50mg daily Continue coreg 25mg BID Continue Triamterene/HCTZ  Dyspnea: Notes worsening ALVAREZ. CTA negative for CAD  Mild pHTN on TTE Mild DD on TTE Repeat TTE  PFO detected on MANDY. To take asa before long car drives or plane flights   RV 3M  [EKG obtained to assist in diagnosis and management of assessed problem(s)] : EKG obtained to assist in diagnosis and management of assessed problem(s)

## 2024-08-22 NOTE — PHYSICAL EXAM

## 2024-08-23 ENCOUNTER — APPOINTMENT (OUTPATIENT)
Dept: INTERNAL MEDICINE | Facility: CLINIC | Age: 74
End: 2024-08-23
Payer: MEDICARE

## 2024-08-23 VITALS
DIASTOLIC BLOOD PRESSURE: 78 MMHG | OXYGEN SATURATION: 96 % | HEIGHT: 64 IN | BODY MASS INDEX: 36.54 KG/M2 | SYSTOLIC BLOOD PRESSURE: 122 MMHG | HEART RATE: 57 BPM | WEIGHT: 214 LBS

## 2024-08-23 DIAGNOSIS — R51.9 HEADACHE, UNSPECIFIED: ICD-10-CM

## 2024-08-23 DIAGNOSIS — I10 ESSENTIAL (PRIMARY) HYPERTENSION: ICD-10-CM

## 2024-08-23 DIAGNOSIS — Z00.00 ENCOUNTER FOR GENERAL ADULT MEDICAL EXAMINATION W/OUT ABNORMAL FINDINGS: ICD-10-CM

## 2024-08-23 DIAGNOSIS — E66.9 OBESITY, UNSPECIFIED: ICD-10-CM

## 2024-08-23 DIAGNOSIS — E04.1 NONTOXIC SINGLE THYROID NODULE: ICD-10-CM

## 2024-08-23 PROCEDURE — 36415 COLL VENOUS BLD VENIPUNCTURE: CPT

## 2024-08-23 PROCEDURE — G0439: CPT

## 2024-08-23 NOTE — ASSESSMENT
[FreeTextEntry1] : 74 y/o female with h/o rheumatoid arthritis, borderline diabetes, hypertension is coming in for AWV.  #Obesity  Encouraged lifestyle modification by cutting down on sugar, saturated & trans fats. Encouraged 150 minutes a week of moderate intensity exercise as tolerated.  #HTN -BP at goal today -C/w current regimen -Reassess in 3months  #Thyroid nodule -F/u w/ Endo for FAN  #Headache -F/u for MRI Head  #HCM -Recommended updated COVID vaccine -Labs as below -Patient up to date on pap smear, DEXA and colonsocopy  -Patient following w/ GYN for repeat Mammo  RTC in 3 months

## 2024-08-23 NOTE — HISTORY OF PRESENT ILLNESS
[FreeTextEntry1] : 72 y/o female with h/o rheumatoid arthritis, borderline diabetes, hypertension is coming in for AWV. [de-identified] : 74 y/o female with h/o rheumatoid arthritis, borderline diabetes, hypertension is coming in for AWV.  Has been having HAs for the past month, BP issues   Has had some difficulties w/ balance. MRI Head ordered by Rheum, patient scheduled for end of month.   Follows w/ Dr. Barrow for RA  Follows w/ [facility], scheduled for thyroid FNA soon due to thyroid nodule.  Follows w/ [facility], BP meds adjusted recently, Hydral discontinued and Losartan 50mg qd started, patient took first dose this AM, BP much improved.

## 2024-08-23 NOTE — PHYSICAL EXAM
[Normal Sclera/Conjunctiva] : normal sclera/conjunctiva [PERRL] : pupils equal round and reactive to light [EOMI] : extraocular movements intact [No Lymphadenopathy] : no lymphadenopathy [Supple] : supple [No Respiratory Distress] : no respiratory distress  [No Accessory Muscle Use] : no accessory muscle use [Clear to Auscultation] : lungs were clear to auscultation bilaterally [Normal Rate] : normal rate  [Regular Rhythm] : with a regular rhythm [No Edema] : there was no peripheral edema [Soft] : abdomen soft [Non Tender] : non-tender [Non-distended] : non-distended [Normal Supraclavicular Nodes] : no supraclavicular lymphadenopathy [Normal Anterior Cervical Nodes] : no anterior cervical lymphadenopathy [Coordination Grossly Intact] : coordination grossly intact [No Focal Deficits] : no focal deficits [Normal] : affect was normal and insight and judgment were intact [de-identified] : thyroid nodule

## 2024-08-23 NOTE — HEALTH RISK ASSESSMENT
[Patient reported bone density results were normal] : Patient reported bone density results were normal [Yes] : Yes [Monthly or less (1 pt)] : Monthly or less (1 point) [1 or 2 (0 pts)] : 1 or 2 (0 points) [Never (0 pts)] : Never (0 points) [No] : In the past 12 months have you used drugs other than those required for medical reasons? No [No falls in past year] : Patient reported no falls in the past year [Former] : Former [0-4] : 0-4 [> 15 Years] : > 15 Years [Fully functional (bathing, dressing, toileting, transferring, walking, feeding)] : Fully functional (bathing, dressing, toileting, transferring, walking, feeding) [Fully functional (using the telephone, shopping, preparing meals, housekeeping, doing laundry, using] : Fully functional and needs no help or supervision to perform IADLs (using the telephone, shopping, preparing meals, housekeeping, doing laundry, using transportation, managing medications and managing finances) [Reports changes in hearing] : Reports changes in hearing [Reports changes in vision] : Reports changes in vision [Patient reported mammogram was normal] : Patient reported mammogram was normal [Patient reported PAP Smear was normal] : Patient reported PAP Smear was normal [Audit-CScore] : 1 [de-identified] : Quit 49 years ago  [MammogramDate] : 05/24 [PapSmearDate] : 05/24 [BoneDensityDate] : 03/22 [ColonoscopyDate] : 09/22 [ColonoscopyComments] : Moderate Diverticulosis  [de-identified] : Recommended seeing Audiology  [de-identified] : Plans to see Ophthalmology

## 2024-08-28 LAB
ALBUMIN SERPL ELPH-MCNC: 3.8 G/DL
ALP BLD-CCNC: 52 U/L
ALT SERPL-CCNC: 18 U/L
ANION GAP SERPL CALC-SCNC: 11 MMOL/L
AST SERPL-CCNC: 17 U/L
BILIRUB SERPL-MCNC: 0.2 MG/DL
BUN SERPL-MCNC: 16 MG/DL
CALCIUM SERPL-MCNC: 9.1 MG/DL
CHLORIDE SERPL-SCNC: 105 MMOL/L
CHOLEST SERPL-MCNC: 215 MG/DL
CO2 SERPL-SCNC: 28 MMOL/L
CREAT SERPL-MCNC: 0.83 MG/DL
EGFR: 73 ML/MIN/1.73M2
GLUCOSE SERPL-MCNC: 106 MG/DL
HCT VFR BLD CALC: 36.1 %
HDLC SERPL-MCNC: 71 MG/DL
HGB BLD-MCNC: 11.4 G/DL
LDLC SERPL CALC-MCNC: 118 MG/DL
MCHC RBC-ENTMCNC: 27.3 PG
MCHC RBC-ENTMCNC: 31.6 GM/DL
MCV RBC AUTO: 86.4 FL
NONHDLC SERPL-MCNC: 145 MG/DL
PLATELET # BLD AUTO: 234 K/UL
POTASSIUM SERPL-SCNC: 3.8 MMOL/L
PROT SERPL-MCNC: 6.5 G/DL
RBC # BLD: 4.18 M/UL
RBC # FLD: 15.7 %
SODIUM SERPL-SCNC: 144 MMOL/L
TRIGL SERPL-MCNC: 156 MG/DL
WBC # FLD AUTO: 3.62 K/UL

## 2024-08-31 ENCOUNTER — OUTPATIENT (OUTPATIENT)
Dept: OUTPATIENT SERVICES | Facility: HOSPITAL | Age: 74
LOS: 1 days | End: 2024-08-31
Payer: MEDICARE

## 2024-08-31 ENCOUNTER — APPOINTMENT (OUTPATIENT)
Dept: MRI IMAGING | Facility: CLINIC | Age: 74
End: 2024-08-31

## 2024-08-31 DIAGNOSIS — Z00.8 ENCOUNTER FOR OTHER GENERAL EXAMINATION: ICD-10-CM

## 2024-08-31 DIAGNOSIS — R44.9 UNSPECIFIED SYMPTOMS AND SIGNS INVOLVING GENERAL SENSATIONS AND PERCEPTIONS: ICD-10-CM

## 2024-08-31 PROCEDURE — 70551 MRI BRAIN STEM W/O DYE: CPT | Mod: 26

## 2024-08-31 PROCEDURE — 70551 MRI BRAIN STEM W/O DYE: CPT

## 2024-09-07 ENCOUNTER — APPOINTMENT (OUTPATIENT)
Dept: RADIOLOGY | Facility: IMAGING CENTER | Age: 74
End: 2024-09-07

## 2024-10-30 ENCOUNTER — RX RENEWAL (OUTPATIENT)
Age: 74
End: 2024-10-30

## 2024-10-31 ENCOUNTER — APPOINTMENT (OUTPATIENT)
Dept: CARDIOLOGY | Facility: CLINIC | Age: 74
End: 2024-10-31
Payer: MEDICARE

## 2024-10-31 VITALS
WEIGHT: 214 LBS | HEART RATE: 65 BPM | BODY MASS INDEX: 36.54 KG/M2 | DIASTOLIC BLOOD PRESSURE: 74 MMHG | SYSTOLIC BLOOD PRESSURE: 136 MMHG | HEIGHT: 64 IN | OXYGEN SATURATION: 95 %

## 2024-10-31 DIAGNOSIS — I51.9 HEART DISEASE, UNSPECIFIED: ICD-10-CM

## 2024-10-31 DIAGNOSIS — I10 ESSENTIAL (PRIMARY) HYPERTENSION: ICD-10-CM

## 2024-10-31 DIAGNOSIS — E78.00 PURE HYPERCHOLESTEROLEMIA, UNSPECIFIED: ICD-10-CM

## 2024-10-31 DIAGNOSIS — R06.09 OTHER FORMS OF DYSPNEA: ICD-10-CM

## 2024-10-31 PROCEDURE — G2211 COMPLEX E/M VISIT ADD ON: CPT

## 2024-10-31 PROCEDURE — 99214 OFFICE O/P EST MOD 30 MIN: CPT

## 2024-11-01 ENCOUNTER — APPOINTMENT (OUTPATIENT)
Dept: INTERNAL MEDICINE | Facility: CLINIC | Age: 74
End: 2024-11-01
Payer: MEDICARE

## 2024-11-01 PROCEDURE — 93306 TTE W/DOPPLER COMPLETE: CPT

## 2024-11-04 ENCOUNTER — NON-APPOINTMENT (OUTPATIENT)
Age: 74
End: 2024-11-04

## 2024-11-04 ENCOUNTER — APPOINTMENT (OUTPATIENT)
Dept: INTERNAL MEDICINE | Facility: CLINIC | Age: 74
End: 2024-11-04
Payer: MEDICARE

## 2024-11-04 VITALS — DIASTOLIC BLOOD PRESSURE: 92 MMHG | SYSTOLIC BLOOD PRESSURE: 142 MMHG

## 2024-11-04 VITALS
HEIGHT: 64 IN | HEART RATE: 71 BPM | DIASTOLIC BLOOD PRESSURE: 93 MMHG | OXYGEN SATURATION: 96 % | BODY MASS INDEX: 36.54 KG/M2 | TEMPERATURE: 98 F | WEIGHT: 214 LBS | SYSTOLIC BLOOD PRESSURE: 160 MMHG

## 2024-11-04 DIAGNOSIS — I10 ESSENTIAL (PRIMARY) HYPERTENSION: ICD-10-CM

## 2024-11-04 DIAGNOSIS — D64.9 ANEMIA, UNSPECIFIED: ICD-10-CM

## 2024-11-04 DIAGNOSIS — I27.20 PULMONARY HYPERTENSION, UNSPECIFIED: ICD-10-CM

## 2024-11-04 PROCEDURE — 36415 COLL VENOUS BLD VENIPUNCTURE: CPT

## 2024-11-04 PROCEDURE — G2211 COMPLEX E/M VISIT ADD ON: CPT

## 2024-11-04 PROCEDURE — 99214 OFFICE O/P EST MOD 30 MIN: CPT

## 2024-11-05 ENCOUNTER — NON-APPOINTMENT (OUTPATIENT)
Age: 74
End: 2024-11-05

## 2024-11-05 LAB
BASOPHILS # BLD AUTO: 0.04 K/UL
BASOPHILS NFR BLD AUTO: 0.9 %
EOSINOPHIL # BLD AUTO: 0.06 K/UL
EOSINOPHIL NFR BLD AUTO: 1.4 %
FERRITIN SERPL-MCNC: 254 NG/ML
FOLATE SERPL-MCNC: 8.6 NG/ML
HCT VFR BLD CALC: 41.2 %
HGB BLD-MCNC: 12.2 G/DL
IMM GRANULOCYTES NFR BLD AUTO: 0.2 %
IRON SATN MFR SERPL: 30 %
IRON SERPL-MCNC: 100 UG/DL
LYMPHOCYTES # BLD AUTO: 1.27 K/UL
LYMPHOCYTES NFR BLD AUTO: 29.8 %
MAN DIFF?: NORMAL
MCHC RBC-ENTMCNC: 26.8 PG
MCHC RBC-ENTMCNC: 29.6 G/DL
MCV RBC AUTO: 90.4 FL
MONOCYTES # BLD AUTO: 0.66 K/UL
MONOCYTES NFR BLD AUTO: 15.5 %
NEUTROPHILS # BLD AUTO: 2.22 K/UL
NEUTROPHILS NFR BLD AUTO: 52.2 %
PLATELET # BLD AUTO: 268 K/UL
RBC # BLD: 4.56 M/UL
RBC # FLD: 15.9 %
TIBC SERPL-MCNC: 333 UG/DL
UIBC SERPL-MCNC: 234 UG/DL
VIT B12 SERPL-MCNC: 847 PG/ML
WBC # FLD AUTO: 4.26 K/UL

## 2024-11-15 ENCOUNTER — RX RENEWAL (OUTPATIENT)
Age: 74
End: 2024-11-15

## 2024-11-15 RX ORDER — TRAZODONE HYDROCHLORIDE 50 MG/1
50 TABLET ORAL
Qty: 90 | Refills: 0 | Status: ACTIVE | COMMUNITY
Start: 2024-11-15 | End: 1900-01-01

## 2024-11-25 ENCOUNTER — RX RENEWAL (OUTPATIENT)
Age: 74
End: 2024-11-25

## 2024-11-25 RX ORDER — FLUTICASONE PROPIONATE 50 UG/1
50 SPRAY, METERED NASAL
Qty: 16 | Refills: 2 | Status: ACTIVE | COMMUNITY
Start: 2024-11-25 | End: 1900-01-01

## 2024-12-03 ENCOUNTER — NON-APPOINTMENT (OUTPATIENT)
Age: 74
End: 2024-12-03

## 2024-12-04 ENCOUNTER — APPOINTMENT (OUTPATIENT)
Dept: ENDOCRINOLOGY | Facility: CLINIC | Age: 74
End: 2024-12-04
Payer: MEDICARE

## 2024-12-04 ENCOUNTER — NON-APPOINTMENT (OUTPATIENT)
Age: 74
End: 2024-12-04

## 2024-12-04 VITALS
OXYGEN SATURATION: 96 % | DIASTOLIC BLOOD PRESSURE: 80 MMHG | WEIGHT: 209 LBS | SYSTOLIC BLOOD PRESSURE: 158 MMHG | HEART RATE: 64 BPM | BODY MASS INDEX: 35.68 KG/M2 | HEIGHT: 64 IN

## 2024-12-04 DIAGNOSIS — R73.03 PREDIABETES.: ICD-10-CM

## 2024-12-04 DIAGNOSIS — E04.1 NONTOXIC SINGLE THYROID NODULE: ICD-10-CM

## 2024-12-04 DIAGNOSIS — E66.9 OBESITY, UNSPECIFIED: ICD-10-CM

## 2024-12-04 DIAGNOSIS — E01.0 IODINE-DEFICIENCY RELATED DIFFUSE (ENDEMIC) GOITER: ICD-10-CM

## 2024-12-04 PROCEDURE — 10005 FNA BX W/US GDN 1ST LES: CPT

## 2024-12-04 PROCEDURE — 99214 OFFICE O/P EST MOD 30 MIN: CPT

## 2024-12-05 LAB — FNA, THYROID: NORMAL

## 2024-12-11 ENCOUNTER — TRANSCRIPTION ENCOUNTER (OUTPATIENT)
Age: 74
End: 2024-12-11

## 2024-12-11 ENCOUNTER — INPATIENT (INPATIENT)
Facility: HOSPITAL | Age: 74
LOS: 0 days | Discharge: HOME | DRG: 316 | End: 2024-12-12
Attending: INTERNAL MEDICINE | Admitting: INTERNAL MEDICINE
Payer: MEDICARE

## 2024-12-11 VITALS
HEIGHT: 64 IN | TEMPERATURE: 98 F | SYSTOLIC BLOOD PRESSURE: 157 MMHG | WEIGHT: 199.96 LBS | HEART RATE: 69 BPM | DIASTOLIC BLOOD PRESSURE: 86 MMHG | OXYGEN SATURATION: 96 % | RESPIRATION RATE: 16 BRPM

## 2024-12-11 DIAGNOSIS — I27.20 PULMONARY HYPERTENSION, UNSPECIFIED: ICD-10-CM

## 2024-12-11 LAB
ANION GAP SERPL CALC-SCNC: 11 MMOL/L — SIGNIFICANT CHANGE UP (ref 5–17)
BUN SERPL-MCNC: 20 MG/DL — SIGNIFICANT CHANGE UP (ref 7–23)
CALCIUM SERPL-MCNC: 10 MG/DL — SIGNIFICANT CHANGE UP (ref 8.4–10.5)
CHLORIDE SERPL-SCNC: 108 MMOL/L — SIGNIFICANT CHANGE UP (ref 96–108)
CO2 SERPL-SCNC: 25 MMOL/L — SIGNIFICANT CHANGE UP (ref 22–31)
CREAT SERPL-MCNC: 0.95 MG/DL — SIGNIFICANT CHANGE UP (ref 0.5–1.3)
EGFR: 62 ML/MIN/1.73M2 — SIGNIFICANT CHANGE UP
GLUCOSE BLDC GLUCOMTR-MCNC: 112 MG/DL — HIGH (ref 70–99)
GLUCOSE SERPL-MCNC: 92 MG/DL — SIGNIFICANT CHANGE UP (ref 70–99)
HCT VFR BLD CALC: 39.2 % — SIGNIFICANT CHANGE UP (ref 34.5–45)
HGB BLD-MCNC: 12.2 G/DL — SIGNIFICANT CHANGE UP (ref 11.5–15.5)
MCHC RBC-ENTMCNC: 26.7 PG — LOW (ref 27–34)
MCHC RBC-ENTMCNC: 31.1 G/DL — LOW (ref 32–36)
MCV RBC AUTO: 85.8 FL — SIGNIFICANT CHANGE UP (ref 80–100)
NRBC # BLD: 0 /100 WBCS — SIGNIFICANT CHANGE UP (ref 0–0)
PLATELET # BLD AUTO: 244 K/UL — SIGNIFICANT CHANGE UP (ref 150–400)
POTASSIUM SERPL-MCNC: 4 MMOL/L — SIGNIFICANT CHANGE UP (ref 3.5–5.3)
POTASSIUM SERPL-SCNC: 4 MMOL/L — SIGNIFICANT CHANGE UP (ref 3.5–5.3)
RBC # BLD: 4.57 M/UL — SIGNIFICANT CHANGE UP (ref 3.8–5.2)
RBC # FLD: 15.7 % — HIGH (ref 10.3–14.5)
SODIUM SERPL-SCNC: 144 MMOL/L — SIGNIFICANT CHANGE UP (ref 135–145)
WBC # BLD: 5.07 K/UL — SIGNIFICANT CHANGE UP (ref 3.8–10.5)
WBC # FLD AUTO: 5.07 K/UL — SIGNIFICANT CHANGE UP (ref 3.8–10.5)

## 2024-12-11 PROCEDURE — 99152 MOD SED SAME PHYS/QHP 5/>YRS: CPT

## 2024-12-11 PROCEDURE — 93010 ELECTROCARDIOGRAM REPORT: CPT

## 2024-12-11 PROCEDURE — 93451 RIGHT HEART CATH: CPT | Mod: 26

## 2024-12-11 RX ORDER — TRIAMTERENE AND HYDROCHLOROTHIAZIDE 25; 37.5 MG/1; MG/1
1 CAPSULE ORAL DAILY
Refills: 0 | Status: DISCONTINUED | OUTPATIENT
Start: 2024-12-11 | End: 2024-12-12

## 2024-12-11 RX ORDER — CARVEDILOL 25 MG/1
1 TABLET, FILM COATED ORAL
Refills: 0 | DISCHARGE

## 2024-12-11 RX ORDER — TRAZODONE HYDROCHLORIDE 150 MG/1
50 TABLET ORAL AT BEDTIME
Refills: 0 | Status: DISCONTINUED | OUTPATIENT
Start: 2024-12-11 | End: 2024-12-12

## 2024-12-11 RX ORDER — FLUTICASONE PROPIONATE 50 MCG
2 SPRAY, SUSPENSION NASAL
Refills: 0 | Status: DISCONTINUED | OUTPATIENT
Start: 2024-12-11 | End: 2024-12-12

## 2024-12-11 RX ORDER — GLUCAGON INJECTION, SOLUTION 0.5 MG/.1ML
1 INJECTION, SOLUTION SUBCUTANEOUS ONCE
Refills: 0 | Status: DISCONTINUED | OUTPATIENT
Start: 2024-12-11 | End: 2024-12-12

## 2024-12-11 RX ORDER — 0.9 % SODIUM CHLORIDE 0.9 %
1000 INTRAVENOUS SOLUTION INTRAVENOUS
Refills: 0 | Status: DISCONTINUED | OUTPATIENT
Start: 2024-12-11 | End: 2024-12-12

## 2024-12-11 RX ORDER — CARVEDILOL 25 MG/1
25 TABLET, FILM COATED ORAL EVERY 12 HOURS
Refills: 0 | Status: DISCONTINUED | OUTPATIENT
Start: 2024-12-11 | End: 2024-12-12

## 2024-12-11 RX ORDER — OXYCODONE AND ACETAMINOPHEN 5; 325 MG/1; MG/1
1 TABLET ORAL
Refills: 0 | DISCHARGE

## 2024-12-11 RX ORDER — PANTOPRAZOLE SODIUM 40 MG/1
40 TABLET, DELAYED RELEASE ORAL
Refills: 0 | Status: DISCONTINUED | OUTPATIENT
Start: 2024-12-11 | End: 2024-12-12

## 2024-12-11 RX ORDER — FLUTICASONE PROPIONATE 50 MCG
1 SPRAY, SUSPENSION NASAL
Refills: 0 | DISCHARGE

## 2024-12-11 RX ORDER — LEFLUNOMIDE 10 MG/1
20 TABLET ORAL DAILY
Refills: 0 | Status: DISCONTINUED | OUTPATIENT
Start: 2024-12-11 | End: 2024-12-12

## 2024-12-11 RX ORDER — LOSARTAN POTASSIUM 100 MG/1
1 TABLET, FILM COATED ORAL
Refills: 0 | DISCHARGE

## 2024-12-11 RX ORDER — INFLUENZA VIRUS VACCINE 15; 15; 15; 15 UG/.5ML; UG/.5ML; UG/.5ML; UG/.5ML
0.5 SUSPENSION INTRAMUSCULAR ONCE
Refills: 0 | Status: DISCONTINUED | OUTPATIENT
Start: 2024-12-11 | End: 2024-12-12

## 2024-12-11 RX ORDER — CYCLOSPORINE 0.5 MG/ML
1 EMULSION OPHTHALMIC EVERY 12 HOURS
Refills: 0 | Status: DISCONTINUED | OUTPATIENT
Start: 2024-12-11 | End: 2024-12-12

## 2024-12-11 RX ORDER — PANTOPRAZOLE SODIUM 40 MG/1
1 TABLET, DELAYED RELEASE ORAL
Refills: 0 | DISCHARGE

## 2024-12-11 RX ORDER — LOSARTAN POTASSIUM 100 MG/1
100 TABLET, FILM COATED ORAL DAILY
Refills: 0 | Status: DISCONTINUED | OUTPATIENT
Start: 2024-12-11 | End: 2024-12-12

## 2024-12-11 RX ORDER — HYDROXYCHLOROQUINE SULFATE 200 MG/1
200 TABLET, FILM COATED ORAL DAILY
Refills: 0 | Status: DISCONTINUED | OUTPATIENT
Start: 2024-12-11 | End: 2024-12-12

## 2024-12-11 RX ORDER — CYCLOSPORINE 0.5 MG/ML
1 EMULSION OPHTHALMIC
Refills: 0 | DISCHARGE

## 2024-12-11 RX ADMIN — CARVEDILOL 25 MILLIGRAM(S): 25 TABLET, FILM COATED ORAL at 20:20

## 2024-12-11 RX ADMIN — HYDROXYCHLOROQUINE SULFATE 200 MILLIGRAM(S): 200 TABLET, FILM COATED ORAL at 20:20

## 2024-12-11 RX ADMIN — LEFLUNOMIDE 20 MILLIGRAM(S): 10 TABLET ORAL at 21:45

## 2024-12-11 RX ADMIN — TRIAMTERENE AND HYDROCHLOROTHIAZIDE 1 TABLET(S): 25; 37.5 CAPSULE ORAL at 20:20

## 2024-12-11 RX ADMIN — LOSARTAN POTASSIUM 100 MILLIGRAM(S): 100 TABLET, FILM COATED ORAL at 20:20

## 2024-12-11 NOTE — DISCHARGE NOTE PROVIDER - NSDCFUADDINST_GEN_ALL_CORE_FT
Wound Care:   the day AFTER your procedure remove bandage GENTLY, and clean using  mild soap and gentle warm, water stream, pat dry. leave OPEN to air. YOU MAY SHOWER   DO NOT apply lotions, creams, ointments, powder, perfumes to your incision site  DO NOT SOAK your site for 1 week ( no baths, no pools, no tubs, etc...)  Check  your groin and /or wrist daily.A small amount of bruising, and soreness are normal    ACTIVITY: for 24 hours   - DO NOT DRIVE  - DO NOT make any important decisions or sign legal documents   - DO NOT operate heavy machineries   - you may resume sexual activity in 48 hours, unless otherwise instructed by your cardiologist     If your procedure was done through the WRIST: for the NEXT 3DAYS:  - avoid pushing, pulling, with that affected wrist   - avoid repeated movement of that hand and wrist ( eg: typing, hammering)  - DO NOT LIFT anything more than 5 lbs     If your procedure was done through the GROIN: for the NEXT 5 DAYS  - Limit climbing stairs, DO NOT soak in bathtub or pool  - no strenuous activities, pushing, pulling, straining  - Do not lift anything 10lbs or heavier     MEDICATION:   take your medications as explained ( see discharge paperwork)   If you received a STENT, you will be taking antiplatelet medications to KEEP YOUR STENT OPEN ( eg: Aspirin, Plavix, Brilinta, Effient, etc).  Take as prescribed DO NOT STOP taking them without consulting with your cardiologist first.     Follow heart healthy diet reccomended by your doctor, , if you smoke STOP SMOKING ( may call [phone] for center of tobacco control if you need assistance)     CALL your doctor to make appointment in 2 WEEKS     ***CALL YOUR DOCTOR***  if you experience: fever, chills, body aches, or severe pain, swelling, redness, heat or yellow discharge at incision site  If you experience Bleeding or excruciating pain at the procedural site, sweliing ( golf ball size) at your procedural site  If you experience CHEST PAIN  If you experience extremity numbness, tingling, temperature change ( of your procedural site)   If you are unable to reach your doctor, you may contact:   -Cardiology Office at [facility] at [phone] or   - [facility] [phone]  - [facility] [phone]

## 2024-12-11 NOTE — H&P CARDIOLOGY - NSICDXPASTMEDICALHX_GEN_ALL_CORE_FT
PAST MEDICAL HISTORY:  Carpal tunnel syndrome     H/O pulmonary hypertension     HLD (hyperlipidemia)     Hypertension     Osteoarthritis     PFO (patent foramen ovale)     Prediabetes      PAST MEDICAL HISTORY:  Carpal tunnel syndrome     H/O pulmonary hypertension     HLD (hyperlipidemia)     Hypertension     ANDRIY (obstructive sleep apnea)     Osteoarthritis     PFO (patent foramen ovale)     Prediabetes      PAST MEDICAL HISTORY:  Carpal tunnel syndrome     Chronic back pain     H/O pulmonary hypertension     HLD (hyperlipidemia)     Hypertension     ANDIRY (obstructive sleep apnea)     Osteoarthritis     PFO (patent foramen ovale)     Prediabetes

## 2024-12-11 NOTE — H&P CARDIOLOGY - HISTORY OF PRESENT ILLNESS
This is a 75 y/o F with PMH of HTN, RA and OA, pre DM ( last Aic 5.9%), mild pulmonary HTN,  coronary CT, JACQUELINE with incomplete opacification MANDY without thrombus, small PFO with L to R shunt dx 08/2023.  Seen and evaluated by Dr Иван Alvarado for c/c of worsening baseline dyspnea, recent repeat TTE with a new PASP of 61mmHg ( increased from previous study as per cards note, no official echo report available).  Presents here today for RHC for further evaluation of pulmonary hypertension and need for PFO closure.          TTE 2023:  1. Normal left ventricular size, wall thickness, wall motion, and systolic function. Left ventricular ejection fraction is   visually estimated at 60 to 65%.  2. The left ventricular volumes are mildly increased.  3. Mild (Grade 1) left ventricular diastolic dysfunction.  4. Normal right ventricular size and systolic function.  5. The left atrium is normal in size.  6. Mild thickening of the aortic valve leaflets. Mild aortic stenosis.  7. PASP at least 40 mmHg (Pk TR gradient 37, no IVC)   EST 5/2021: DTS 7, no ischemia   CTA 8/2023:  1. Non obstructive disease of the visualized epicardial coronary arteries by cardiac CTA as detailed above  2. Agatston calcium score of 0.  3. Incomplete opacification of the left atrial appendage (JACQUELINE), may represent JACQUELINE thrombus vs slow flow,   recommend dedicated cardiac imaging as clinically indicated.  4. Small left to right trans-atrial flow jet suggestive of small patent foramen ovale vs atrial septal defect. Possible   atrial septal aneurysm.  5. Borderline left ventricular wall thickness. Atrial are qualitatively enlarged, recommend correlation with   echocardiogram as clinically indicated.        < from: Transesophageal Echocardiogram w/o TTE (08.25.23 @ 08:06) >  CONCLUSIONS:  1. Normal mitral valve. Mild mitral regurgitation.  2. Normal left atrium. No left atrial or left atrial  appendage thrombus.  3. Normal right ventricular size and function.  4. Normal tricuspid valve.  5. Normal pulmonic valve.  6. Color doppler interrogtation shows left to right  interatrial flow, agitated saline injection (with Valsalva)  demonstrates no right to left interatrial flow.    < end of copied text >    CTA 8/2023:  1. Non obstructive disease of the visualized epicardial coronary arteries by cardiac CTA as detailed above  2. Agatston calcium score of 0.  3. Incomplete opacification of the left atrial appendage (JACQUELINE), may represent JACQUELINE thrombus vs slow flow,   recommend dedicated cardiac imaging as clinically indicated.  4. Small left to right trans-atrial flow jet suggestive of small patent foramen ovale vs atrial septal defect. Possible   atrial septal aneurysm.  5. Borderline left ventricular wall thickness. Atrial are qualitatively enlarged, recommend correlation with   echocardiogram as clinically indicated.     This is a 75 y/o F with PMH of HTN, RA and OA, pre DM ( last Aic 5.9%), mild pulmonary HTN, ANDRIY non compliant with CPAP, coronary CT, JACQUELINE with incomplete opacification MANDY without thrombus, small PFO with L to R shunt dx 08/2023.  Seen and evaluated by Dr Иван Alvarado for c/c of worsening baseline dyspnea, recent repeat TTE with a new PASP of 61mmHg ( increased from previous study as per cards note, no official echo report available).  Presents here today for RHC for further evaluation of pulmonary hypertension and need for PFO closure.          TTE 2023:  1. Normal left ventricular size, wall thickness, wall motion, and systolic function. Left ventricular ejection fraction is   visually estimated at 60 to 65%.  2. The left ventricular volumes are mildly increased.  3. Mild (Grade 1) left ventricular diastolic dysfunction.  4. Normal right ventricular size and systolic function.  5. The left atrium is normal in size.  6. Mild thickening of the aortic valve leaflets. Mild aortic stenosis.  7. PASP at least 40 mmHg (Pk TR gradient 37, no IVC)   EST 5/2021: DTS 7, no ischemia   CTA 8/2023:  1. Non obstructive disease of the visualized epicardial coronary arteries by cardiac CTA as detailed above  2. Agatston calcium score of 0.  3. Incomplete opacification of the left atrial appendage (JACQUELINE), may represent JACQUELINE thrombus vs slow flow,   recommend dedicated cardiac imaging as clinically indicated.  4. Small left to right trans-atrial flow jet suggestive of small patent foramen ovale vs atrial septal defect. Possible   atrial septal aneurysm.  5. Borderline left ventricular wall thickness. Atrial are qualitatively enlarged, recommend correlation with   echocardiogram as clinically indicated.        < from: Transesophageal Echocardiogram w/o TTE (08.25.23 @ 08:06) >  CONCLUSIONS:  1. Normal mitral valve. Mild mitral regurgitation.  2. Normal left atrium. No left atrial or left atrial  appendage thrombus.  3. Normal right ventricular size and function.  4. Normal tricuspid valve.  5. Normal pulmonic valve.  6. Color doppler interrogtation shows left to right  interatrial flow, agitated saline injection (with Valsalva)  demonstrates no right to left interatrial flow.    < end of copied text >    CTA 8/2023:  1. Non obstructive disease of the visualized epicardial coronary arteries by cardiac CTA as detailed above  2. Agatston calcium score of 0.  3. Incomplete opacification of the left atrial appendage (JACQUELINE), may represent JACQUELINE thrombus vs slow flow,   recommend dedicated cardiac imaging as clinically indicated.  4. Small left to right trans-atrial flow jet suggestive of small patent foramen ovale vs atrial septal defect. Possible   atrial septal aneurysm.  5. Borderline left ventricular wall thickness. Atrial are qualitatively enlarged, recommend correlation with   echocardiogram as clinically indicated.     This is a 73 y/o F with PMH of HTN, RA and OA, pre DM ( last Aic 5.9%), mild pulmonary HTN, ANDRIY non compliant with CPAP, coronary CT, JACQUELINE with incomplete opacification MANDY without thrombus, small PFO with L to R shunt dx 08/2023, and chronic back pain ( f/u with pain management Dr Berry and Louise, on Percocet TID).  Seen and evaluated by Dr Иван Alvarado for c/c of worsening baseline dyspnea, recent repeat TTE with a new PASP of 61mmHg ( increased from previous study as per cards note, no official echo report available).  Presents here today for RHC for further evaluation of pulmonary hypertension and need for PFO closure.          TTE 2023:  1. Normal left ventricular size, wall thickness, wall motion, and systolic function. Left ventricular ejection fraction is   visually estimated at 60 to 65%.  2. The left ventricular volumes are mildly increased.  3. Mild (Grade 1) left ventricular diastolic dysfunction.  4. Normal right ventricular size and systolic function.  5. The left atrium is normal in size.  6. Mild thickening of the aortic valve leaflets. Mild aortic stenosis.  7. PASP at least 40 mmHg (Pk TR gradient 37, no IVC)   EST 5/2021: DTS 7, no ischemia   CTA 8/2023:  1. Non obstructive disease of the visualized epicardial coronary arteries by cardiac CTA as detailed above  2. Agatston calcium score of 0.  3. Incomplete opacification of the left atrial appendage (JACQUELINE), may represent JACQUELINE thrombus vs slow flow,   recommend dedicated cardiac imaging as clinically indicated.  4. Small left to right trans-atrial flow jet suggestive of small patent foramen ovale vs atrial septal defect. Possible   atrial septal aneurysm.  5. Borderline left ventricular wall thickness. Atrial are qualitatively enlarged, recommend correlation with   echocardiogram as clinically indicated.        < from: Transesophageal Echocardiogram w/o TTE (08.25.23 @ 08:06) >  CONCLUSIONS:  1. Normal mitral valve. Mild mitral regurgitation.  2. Normal left atrium. No left atrial or left atrial  appendage thrombus.  3. Normal right ventricular size and function.  4. Normal tricuspid valve.  5. Normal pulmonic valve.  6. Color doppler interrogtation shows left to right  interatrial flow, agitated saline injection (with Valsalva)  demonstrates no right to left interatrial flow.    < end of copied text >    CTA 8/2023:  1. Non obstructive disease of the visualized epicardial coronary arteries by cardiac CTA as detailed above  2. Agatston calcium score of 0.  3. Incomplete opacification of the left atrial appendage (JACQUELINE), may represent JACQUELINE thrombus vs slow flow,   recommend dedicated cardiac imaging as clinically indicated.  4. Small left to right trans-atrial flow jet suggestive of small patent foramen ovale vs atrial septal defect. Possible   atrial septal aneurysm.  5. Borderline left ventricular wall thickness. Atrial are qualitatively enlarged, recommend correlation with   echocardiogram as clinically indicated.

## 2024-12-11 NOTE — DISCHARGE NOTE PROVIDER - NSDCFUSCHEDAPPT_GEN_ALL_CORE_FT
Gisele Barrow  [facility]  [address]  Scheduled Appointment: 01/08/2025

## 2024-12-11 NOTE — DISCHARGE NOTE PROVIDER - NSDCCPTREATMENT_GEN_ALL_CORE_FT
PRINCIPAL PROCEDURE  Procedure: Right heart catheterization  Findings and Treatment:      PRINCIPAL PROCEDURE  Procedure: Right heart catheterization  Findings and Treatment: 12/11 s/p RHC via RFV

## 2024-12-11 NOTE — DISCHARGE NOTE PROVIDER - NSDCMRMEDTOKEN_GEN_ALL_CORE_FT
Coreg 25 mg oral tablet: 1 tab(s) orally 2 times a day  Flonase 50 mcg/inh nasal spray: 1 spray(s) in each nostril once a day  leflunomide 20 mg oral tablet: 1 tab(s) orally once a day  losartan 100 mg oral tablet: 1 tab(s) orally once a day  Percocet 5 mg-325 mg oral tablet: 1 tab(s) orally 3 times a day for chronic back pain 2/2 herniated cervial and lumbar spine  [facility] Reference #: [id number]  Plaquenil 200 mg oral tablet: 1 tab(s) orally once a day  Protonix 40 mg oral delayed release tablet: 1 tab(s) orally once a day  Restasis 0.05% ophthalmic emulsion: 1 drop(s) in each affected eye 2 times a day  traZODone 50 mg oral tablet: 1 tab(s) orally once a day (at bedtime)  triamterene-hydrochlorothiazide 37.5 mg-25 mg oral tablet: 1 tab(s) orally once a day

## 2024-12-11 NOTE — CHART NOTE - NSCHARTNOTEFT_GEN_A_CORE
Search Terms: solitario paz, 1950Search Date: 12/11/2024 15:17:28 PM  The Drug Utilization Report below displays all of the controlled substance prescriptions, if any, that your patient has filled in the last twelve months. The information displayed on this report is compiled from pharmacy submissions to the Department, and accurately reflects the information as submitted by the pharmacies.    This report was requested by: Mattie Strange | Reference #: [id number]

## 2024-12-11 NOTE — DISCHARGE NOTE PROVIDER - HOSPITAL COURSE
HPI:  This is a 75 y/o F with PMH of HTN, RA and OA, pre DM ( last Aic 5.9%), mild pulmonary HTN, ANDRIY non compliant with CPAP, coronary CT, JACQUELINE with incomplete opacification MANDY without thrombus, small PFO with L to R shunt dx 08/2023, and chronic back pain ( f/u with pain management Dr Berry and Louise, on Percocet TID).  Seen and evaluated by Dr Иван Alvarado for c/c of worsening baseline dyspnea, recent repeat TTE with a new PASP of 61mmHg ( increased from previous study as per cards note, no official echo report available).  Presents here today for RHC for further evaluation of pulmonary hypertension and need for PFO closure.          TTE 2023:  1. Normal left ventricular size, wall thickness, wall motion, and systolic function. Left ventricular ejection fraction is   visually estimated at 60 to 65%.  2. The left ventricular volumes are mildly increased.  3. Mild (Grade 1) left ventricular diastolic dysfunction.  4. Normal right ventricular size and systolic function.  5. The left atrium is normal in size.  6. Mild thickening of the aortic valve leaflets. Mild aortic stenosis.  7. PASP at least 40 mmHg (Pk TR gradient 37, no IVC)   EST 5/2021: DTS 7, no ischemia   CTA 8/2023:  1. Non obstructive disease of the visualized epicardial coronary arteries by cardiac CTA as detailed above  2. Agatston calcium score of 0.  3. Incomplete opacification of the left atrial appendage (JACQUELINE), may represent JACQUELINE thrombus vs slow flow,   recommend dedicated cardiac imaging as clinically indicated.  4. Small left to right trans-atrial flow jet suggestive of small patent foramen ovale vs atrial septal defect. Possible   atrial septal aneurysm.  5. Borderline left ventricular wall thickness. Atrial are qualitatively enlarged, recommend correlation with   echocardiogram as clinically indicated.        < from: Transesophageal Echocardiogram w/o TTE (08.25.23 @ 08:06) >  CONCLUSIONS:  1. Normal mitral valve. Mild mitral regurgitation.  2. Normal left atrium. No left atrial or left atrial  appendage thrombus.  3. Normal right ventricular size and function.  4. Normal tricuspid valve.  5. Normal pulmonic valve.  6. Color doppler interrogtation shows left to right  interatrial flow, agitated saline injection (with Valsalva)  demonstrates no right to left interatrial flow.    < end of copied text >    CTA 8/2023:  1. Non obstructive disease of the visualized epicardial coronary arteries by cardiac CTA as detailed above  2. Agatston calcium score of 0.  3. Incomplete opacification of the left atrial appendage (JACQUELINE), may represent JACQUELINE thrombus vs slow flow,   recommend dedicated cardiac imaging as clinically indicated.  4. Small left to right trans-atrial flow jet suggestive of small patent foramen ovale vs atrial septal defect. Possible   atrial septal aneurysm.  5. Borderline left ventricular wall thickness. Atrial are qualitatively enlarged, recommend correlation with   echocardiogram as clinically indicated.     (11 Dec 2024 14:48)  12/11/24 s/p RHC  via RFV  Staying overnight as no ride home    HPI: This is a 75 y/o F with PMH of HTN, RA and OA, pre DM ( last Aic 5.9%), mild pulmonary HTN, ANDRIY non compliant with CPAP, coronary CT, JACQUELINE with incomplete opacification MANDY without thrombus, small PFO with L to R shunt dx 08/2023, and chronic back pain ( f/u with pain management Dr Berry and [place], on Percocet TID).  Seen and evaluated by Dr Иван Alvarado for c/c of worsening baseline dyspnea, recent repeat TTE with a new PASP of 61mmHg ( increased from previous study as per cards note, no official echo report available).  Presents here today for RHC for further evaluation of pulmonary hypertension and need for PFO closure.       12/11/24 s/p RHC  via RFV  Staying overnight as no ride home

## 2024-12-11 NOTE — DISCHARGE NOTE PROVIDER - NSDCCPCAREPLAN_GEN_ALL_CORE_FT
PRINCIPAL DISCHARGE DIAGNOSIS  Diagnosis: Pulmonary hypertension  Assessment and Plan of Treatment:       SECONDARY DISCHARGE DIAGNOSES  Diagnosis: PFO (patent foramen ovale)  Assessment and Plan of Treatment:      PRINCIPAL DISCHARGE DIAGNOSIS  Diagnosis: Pulmonary hypertension  Assessment and Plan of Treatment:       SECONDARY DISCHARGE DIAGNOSES  Diagnosis: HTN (hypertension)  Assessment and Plan of Treatment: You have high blood pressure. Continue taking your blood pressure medications as prescribed. Eat a heart healthy diet with low salt; exercise regularly (if cleared by your primary care doctor or cardiologist). Maintain a heart healthy weight and include healthy ways to manage stress. If you smoke, quit. If you need assistance to help you stop smoking, please use the following resource: [facility] – ([phone]). Follow up with your primary care doctor to continue having your blood pressure checked on a continual basis.

## 2024-12-11 NOTE — DISCHARGE NOTE PROVIDER - CARE PROVIDER_API CALL
Иван Alvarado  Cardiovascular Disease  [address] [place] [address]  Phone: [phone]  Fax: [phone]  Established Patient  Follow Up Time: 1 week

## 2024-12-11 NOTE — DISCHARGE NOTE PROVIDER - NSDCQMPCI_CARD_ALL_CORE
Daiana is on ADHD meds/ loss of appetite.  Mom requesting pediasure refill as he takes those daily.   No

## 2024-12-12 ENCOUNTER — TRANSCRIPTION ENCOUNTER (OUTPATIENT)
Age: 74
End: 2024-12-12

## 2024-12-12 VITALS
OXYGEN SATURATION: 95 % | RESPIRATION RATE: 17 BRPM | DIASTOLIC BLOOD PRESSURE: 79 MMHG | TEMPERATURE: 98 F | SYSTOLIC BLOOD PRESSURE: 156 MMHG | HEART RATE: 71 BPM

## 2024-12-12 PROBLEM — R73.03 PREDIABETES: Chronic | Status: ACTIVE | Noted: 2024-12-11

## 2024-12-12 PROBLEM — Q21.12 PATENT FORAMEN OVALE: Chronic | Status: ACTIVE | Noted: 2024-12-11

## 2024-12-12 PROBLEM — E78.5 HYPERLIPIDEMIA, UNSPECIFIED: Chronic | Status: ACTIVE | Noted: 2024-12-11

## 2024-12-12 PROBLEM — Z86.79 PERSONAL HISTORY OF OTHER DISEASES OF THE CIRCULATORY SYSTEM: Chronic | Status: ACTIVE | Noted: 2024-12-11

## 2024-12-12 PROCEDURE — 93451 RIGHT HEART CATH: CPT

## 2024-12-12 PROCEDURE — 93005 ELECTROCARDIOGRAM TRACING: CPT

## 2024-12-12 PROCEDURE — C1894: CPT

## 2024-12-12 PROCEDURE — 80048 BASIC METABOLIC PNL TOTAL CA: CPT

## 2024-12-12 PROCEDURE — 82962 GLUCOSE BLOOD TEST: CPT

## 2024-12-12 PROCEDURE — C1889: CPT

## 2024-12-12 PROCEDURE — 99232 SBSQ HOSP IP/OBS MODERATE 35: CPT

## 2024-12-12 PROCEDURE — 85027 COMPLETE CBC AUTOMATED: CPT

## 2024-12-12 RX ADMIN — CARVEDILOL 25 MILLIGRAM(S): 25 TABLET, FILM COATED ORAL at 06:14

## 2024-12-12 NOTE — DISCHARGE NOTE NURSING/CASE MANAGEMENT/SOCIAL WORK - FINANCIAL ASSISTANCE
[facility] provides services at a reduced cost to those who are determined to be eligible through [facility]’s financial assistance program. Information regarding [facility]’s financial assistance program can be found by going to [email or web address] or by calling [phone].

## 2024-12-12 NOTE — DISCHARGE NOTE NURSING/CASE MANAGEMENT/SOCIAL WORK - PATIENT PORTAL LINK FT
You can access the FollowMyHealth Patient Portal offered by [facility] by registering at the following website: [email or web address]. By joining [email or web address]’s FollowMyHealth portal, you will also be able to view your health information using other applications (apps) compatible with our system.

## 2024-12-12 NOTE — PROGRESS NOTE ADULT - SUBJECTIVE AND OBJECTIVE BOX
[facility] INVASIVE CARDIOLOGY- (Pam, Jan, Che, Emilia, Diamond, Lizbeth, Houston, Kevin)   CARDIAC CATH LAB, ACP TEAM   [phone]    CHIEF COMPLAINT: Patient is a 74y old female who presents with a chief complaint of Evaluation of pulmonary hypertension and PFO    HPI: This is a 75 y/o F with PMH of HTN, RA and OA, pre DM ( last Aic 5.9%), mild pulmonary HTN, ANDRIY non compliant with CPAP, coronary CT, JACQUELINE with incomplete opacification MANDY without thrombus, small PFO with L to R shunt dx 08/2023, and chronic back pain ( f/u with pain management Dr Berry and Louise, on Percocet TID).  Seen and evaluated by Dr Иван Alvarado for c/c of worsening baseline dyspnea, recent repeat TTE with a new PASP of 61mmHg ( increased from previous study as per cards note, no official echo report available).  Presents here today for RHC for further evaluation of pulmonary hypertension and need for PFO closure.    Subjective/Observations: Patient seen and examined.  Denies chest pain, dyspena, dizziness, palpitations, N&V, HA, upper/lower extremity pain, numbness/tingling    Review of Systems all WNL except below indicated:    Constitutional: [ ] Fever [ ] Chills [ ] Fatigue [ ] Weight change   HEENT: [ ] Blurred vision [ ] Eye Pain [ ] Headache [ ] Runny nose [ ] Sore Throat   Respiratory: [ ] Cough [ ] Wheezing [ ] Shortness of breath  Cardiovascular: [ ] Chest Pain [ ] Palpitations [ ] ALVAREZ [ ] PND [ ] Orthopnea  Gastrointestinal: [ ] Abdominal Pain [ ] Diarrhea [ ] Constipation [ ] Hemorrhoids [ ] Nausea [ ] Vomiting  Genitourinary: [ ] Nocturia [ ] Dysuria [ ] Incontinence  Extremities: [ ] Swelling [ ] Joint Pain  Neurologic: [ ] Focal deficit [ ] Paresthesias [ ] Syncope  Lymphatic: [ ] Swelling [ ] Lymphadenopathy   Skin: [ ] Rash [ ] Ecchymoses [ ] Wounds [ ] Lesions  Psychiatry: [ ] Depression [ ] Suicidal/Homicidal Ideation [ ] Anxiety [ ] Sleep Disturbances  [ ] 10 point review of systems is otherwise negative except as mentioned above            [ ]Unable to obtain    PAST MEDICAL & SURGICAL HISTORY:  Hypertension    Osteoarthritis    Carpal tunnel syndrome    PFO (patent foramen ovale)    Prediabetes    HLD (hyperlipidemia)    H/O pulmonary hypertension    ANDRIY (obstructive sleep apnea)    Chronic back pain    S/P arthroscopic knee surgery    S/P lumpectomy of breast    S/P colonoscopy    S/P carpal tunnel release    S/P myomectomy  about 25 years ago.    S/P cubital tunnel release  bilateral about 5 years ago.    MEDICATIONS  (STANDING):  carvedilol 25 milliGRAM(s) Oral every 12 hours  cycloSPORINE (RESTASIS) 0.05% Emulsion 1 Drop(s) Both EYES every 12 hours  fluticasone propionate 50 MICROgram(s)/spray Nasal Spray 2 Spray(s) Both Nostrils two times a day  glucagon  Injectable 1 milliGRAM(s) IntraMuscular once  hydroxychloroquine 200 milliGRAM(s) Oral daily  influenza  Vaccine (HIGH DOSE) 0.5 milliLiter(s) IntraMuscular once  insulin lispro (ADMELOG) corrective regimen sliding scale   SubCutaneous three times a day before meals  insulin lispro (ADMELOG) corrective regimen sliding scale   SubCutaneous at bedtime  leflunomide 20 milliGRAM(s) Oral daily  losartan 100 milliGRAM(s) Oral daily  oxycodone    5 mG/acetaminophen 325 mG 1 Tablet(s) Oral every 3 hours  pantoprazole    Tablet 40 milliGRAM(s) Oral before breakfast  traZODone 50 milliGRAM(s) Oral at bedtime  triamterene 37.5 mG/hydrochlorothiazide 25 mG Tablet 1 Tablet(s) Oral daily    MEDICATIONS  (PRN):  dextrose Oral Gel 15 Gram(s) Oral once PRN Blood Glucose LESS THAN 70 milliGRAM(s)/deciliter    Allergies    IV Contrast (Unknown)  shellfish (Unknown)  Persantine (Blisters)  adhesives (Blisters)  latex (Blisters)    Intolerances    Vital Signs Last 24 Hrs  T(C): 36.5 (12 Dec 2024 00:00), Max: 36.6 (11 Dec 2024 14:35)  T(F): 97.7 (12 Dec 2024 00:00), Max: 97.8 (11 Dec 2024 14:35)  HR: 66 (12 Dec 2024 00:00) (60 - 90)  BP: 115/58 (12 Dec 2024 00:00) (102/55 - 180/81)  BP(mean): 73 (11 Dec 2024 23:00) (73 - 123)  RR: 17 (12 Dec 2024 00:00) (16 - 17)  SpO2: 94% (12 Dec 2024 00:00) (89% - 96%)    Parameters below as of 12 Dec 2024 00:00  Patient On (Oxygen Delivery Method): room air    I&O's Summary    Weight (kg): 90.7 (12-11 @ 14:35)    FOCUSED PHYSICAL EXAM:  Pulmonary: Non-labored, breath sounds are clear bilaterally, No wheezing, rales or rhonchi  Cardiovascular: Regular, S1 and S2, No murmurs, rubs, gallops or clicks  Cath site: Right groin stable w/o bleeding or hematoma, soft, + pulses     LABS: All Labs Reviewed:                        12.2   5.07  )-----------( 244      ( 11 Dec 2024 15:01 )             39.2     11 Dec 2024 15:01    144    |  108    |  20     ----------------------------<  92     4.0     |  25     |  0.95     Ca    10.0       11 Dec 2024 15:01    RESULTS:    ECHO:  TTE 2023:  1. Normal left ventricular size, wall thickness, wall motion, and systolic function. Left ventricular ejection fraction is   visually estimated at 60 to 65%.  2. The left ventricular volumes are mildly increased.  3. Mild (Grade 1) left ventricular diastolic dysfunction.  4. Normal right ventricular size and systolic function.  5. The left atrium is normal in size.  6. Mild thickening of the aortic valve leaflets. Mild aortic stenosis.  7. PASP at least 40 mmHg (Pk TR gradient 37, no IVC)   EST 5/2021: DTS 7, no ischemia   CTA 8/2023:  1. Non obstructive disease of the visualized epicardial coronary arteries by cardiac CTA as detailed above  2. Agatston calcium score of 0.  3. Incomplete opacification of the left atrial appendage (JACQUELINE), may represent JACQUELINE thrombus vs slow flow,   recommend dedicated cardiac imaging as clinically indicated.  4. Small left to right trans-atrial flow jet suggestive of small patent foramen ovale vs atrial septal defect. Possible   atrial septal aneurysm.  5. Borderline left ventricular wall thickness. Atrial are qualitatively enlarged, recommend correlation with   echocardiogram as clinically indicated.    CT CORONARIES:  CTA 8/2023:  1. Non obstructive disease of the visualized epicardial coronary arteries by cardiac CTA as detailed above  2. Agatston calcium score of 0.  3. Incomplete opacification of the left atrial appendage (JACQUELINE), may represent JACQUELINE thrombus vs slow flow,   recommend dedicated cardiac imaging as clinically indicated.  4. Small left to right trans-atrial flow jet suggestive of small patent foramen ovale vs atrial septal defect. Possible   atrial septal aneurysm.  5. Borderline left ventricular wall thickness. Atrial are qualitatively enlarged, recommend correlation with   echocardiogram as clinically indicated.

## 2024-12-12 NOTE — PROGRESS NOTE ADULT - ASSESSMENT
HPI: This is a 75 y/o F with PMH of HTN, RA and OA, pre DM ( last Aic 5.9%), mild pulmonary HTN, ANDRIY non compliant with CPAP, coronary CT, JACQUELINE with incomplete opacification MANDY without thrombus, small PFO with L to R shunt dx 08/2023, and chronic back pain ( f/u with pain management Dr Berry and Louise, on Percocet TID).  Seen and evaluated by Dr Иван Alvarado for c/c of worsening baseline dyspnea, recent repeat TTE with a new PASP of 61mmHg ( increased from previous study as per cards note, no official echo report available).  Presents here today for RHC for further evaluation of pulmonary hypertension and need for PFO closure.       # Pulmonary HTN  12/11 s/p RHC via RFV  Patient without a ride home after procedure- admitted overnight  Anticipate discharge in AM if site and condition remain stable    # HTN  Normotensive  Continue Carvedilol 25 mg BID, Losartan 100 mg daily  DASH diet    # Pre Diabetes  Continue SSI while inpatient  FS TID/HS  Continue consistent carb diet    Raul Esposito [facility]  Invasive Cardiology  Ext [phone]

## 2024-12-12 NOTE — DISCHARGE NOTE NURSING/CASE MANAGEMENT/SOCIAL WORK - NSDCPEFALRISK_GEN_ALL_CORE
For information on Fall & Injury Prevention, visit: [email or web address] OR  [email or web address] OR  https://www.cdc.gov/steadi/patient.html

## 2024-12-13 ENCOUNTER — APPOINTMENT (OUTPATIENT)
Dept: ORTHOPEDIC SURGERY | Facility: CLINIC | Age: 74
End: 2024-12-13
Payer: MEDICARE

## 2024-12-13 VITALS — BODY MASS INDEX: 34.15 KG/M2 | HEIGHT: 64 IN | WEIGHT: 200 LBS

## 2024-12-13 DIAGNOSIS — M25.512 PAIN IN LEFT SHOULDER: ICD-10-CM

## 2024-12-13 PROCEDURE — 73030 X-RAY EXAM OF SHOULDER: CPT | Mod: LT

## 2024-12-13 PROCEDURE — 20610 DRAIN/INJ JOINT/BURSA W/O US: CPT | Mod: LT

## 2024-12-13 PROCEDURE — 99214 OFFICE O/P EST MOD 30 MIN: CPT | Mod: 25

## 2024-12-16 ENCOUNTER — APPOINTMENT (OUTPATIENT)
Dept: CARDIOLOGY | Facility: CLINIC | Age: 74
End: 2024-12-16
Payer: MEDICARE

## 2024-12-16 VITALS — SYSTOLIC BLOOD PRESSURE: 134 MMHG | DIASTOLIC BLOOD PRESSURE: 72 MMHG

## 2024-12-16 VITALS
HEART RATE: 63 BPM | OXYGEN SATURATION: 96 % | SYSTOLIC BLOOD PRESSURE: 130 MMHG | HEIGHT: 64 IN | BODY MASS INDEX: 34.15 KG/M2 | WEIGHT: 200 LBS | DIASTOLIC BLOOD PRESSURE: 80 MMHG

## 2024-12-16 DIAGNOSIS — I10 ESSENTIAL (PRIMARY) HYPERTENSION: ICD-10-CM

## 2024-12-16 DIAGNOSIS — I51.9 HEART DISEASE, UNSPECIFIED: ICD-10-CM

## 2024-12-16 DIAGNOSIS — R06.09 OTHER FORMS OF DYSPNEA: ICD-10-CM

## 2024-12-16 PROBLEM — M54.9 DORSALGIA, UNSPECIFIED: Chronic | Status: ACTIVE | Noted: 2024-12-11

## 2024-12-16 PROBLEM — G47.33 OBSTRUCTIVE SLEEP APNEA (ADULT) (PEDIATRIC): Chronic | Status: ACTIVE | Noted: 2024-12-11

## 2024-12-16 PROCEDURE — 99214 OFFICE O/P EST MOD 30 MIN: CPT

## 2024-12-16 PROCEDURE — G2211 COMPLEX E/M VISIT ADD ON: CPT

## 2024-12-19 ENCOUNTER — RX RENEWAL (OUTPATIENT)
Age: 74
End: 2024-12-19

## 2024-12-19 ENCOUNTER — APPOINTMENT (OUTPATIENT)
Dept: PULMONOLOGY | Facility: CLINIC | Age: 74
End: 2024-12-19
Payer: MEDICARE

## 2024-12-19 ENCOUNTER — APPOINTMENT (OUTPATIENT)
Dept: PULMONOLOGY | Facility: CLINIC | Age: 74
End: 2024-12-19

## 2024-12-19 VITALS
SYSTOLIC BLOOD PRESSURE: 150 MMHG | OXYGEN SATURATION: 97 % | DIASTOLIC BLOOD PRESSURE: 86 MMHG | HEART RATE: 74 BPM | TEMPERATURE: 97.4 F | WEIGHT: 210 LBS | BODY MASS INDEX: 35.85 KG/M2 | RESPIRATION RATE: 12 BRPM | HEIGHT: 64 IN

## 2024-12-19 VITALS
SYSTOLIC BLOOD PRESSURE: 150 MMHG | RESPIRATION RATE: 13 BRPM | HEIGHT: 64 IN | HEART RATE: 83 BPM | TEMPERATURE: 97.3 F | BODY MASS INDEX: 29.53 KG/M2 | DIASTOLIC BLOOD PRESSURE: 85 MMHG | WEIGHT: 173 LBS | OXYGEN SATURATION: 96 %

## 2024-12-19 DIAGNOSIS — I27.20 PULMONARY HYPERTENSION, UNSPECIFIED: ICD-10-CM

## 2024-12-19 PROCEDURE — 99205 OFFICE O/P NEW HI 60 MIN: CPT

## 2024-12-19 PROCEDURE — G2211 COMPLEX E/M VISIT ADD ON: CPT

## 2024-12-19 RX ORDER — FUROSEMIDE 20 MG/1
20 TABLET ORAL
Qty: 90 | Refills: 1 | Status: ACTIVE | COMMUNITY
Start: 2024-12-19 | End: 1900-01-01

## 2024-12-20 ENCOUNTER — RX RENEWAL (OUTPATIENT)
Age: 74
End: 2024-12-20

## 2024-12-20 LAB
ALBUMIN SERPL ELPH-MCNC: 3.9 G/DL
ALP BLD-CCNC: 51 U/L
ALT SERPL-CCNC: 13 U/L
ANION GAP SERPL CALC-SCNC: 17 MMOL/L
AST SERPL-CCNC: 13 U/L
BILIRUB SERPL-MCNC: 0.3 MG/DL
BUN SERPL-MCNC: 19 MG/DL
CALCIUM SERPL-MCNC: 9.8 MG/DL
CENTROMERE IGG SER-ACNC: <0.2 AL
CHLORIDE SERPL-SCNC: 103 MMOL/L
CO2 SERPL-SCNC: 25 MMOL/L
CREAT SERPL-MCNC: 0.88 MG/DL
DSDNA AB SER-ACNC: <1 IU/ML
EGFR: 68 ML/MIN/1.73M2
ENA SCL70 IGG SER IA-ACNC: <0.2 AL
ENA SS-A AB SER IA-ACNC: <0.2 AL
ENA SS-B AB SER IA-ACNC: <0.2 AL
GLUCOSE SERPL-MCNC: 84 MG/DL
NT-PROBNP SERPL-MCNC: 161 PG/ML
POTASSIUM SERPL-SCNC: 3.7 MMOL/L
PROT SERPL-MCNC: 6.3 G/DL
RHEUMATOID FACT SER QL: 63 IU/ML
SODIUM SERPL-SCNC: 144 MMOL/L
TSH SERPL-ACNC: 0.48 UIU/ML

## 2024-12-20 NOTE — ASU PATIENT PROFILE, ADULT - TOBACCO USE
Contacted chandan Two attempts have been made to contact patient. Appointments removed from recall list.    Former smoker

## 2024-12-22 LAB
HBV CORE IGG+IGM SER QL: NONREACTIVE
HBV SURFACE AB SER QL: NONREACTIVE
HBV SURFACE AG SER QL: NONREACTIVE
HCV AB SER QL: NONREACTIVE
HCV S/CO RATIO: 0.12 S/CO
HIV1+2 AB SPEC QL IA.RAPID: NONREACTIVE

## 2024-12-23 LAB — ANA SER IF-ACNC: NEGATIVE

## 2025-01-06 ENCOUNTER — NON-APPOINTMENT (OUTPATIENT)
Age: 75
End: 2025-01-06

## 2025-01-10 ENCOUNTER — APPOINTMENT (OUTPATIENT)
Dept: PULMONOLOGY | Facility: CLINIC | Age: 75
End: 2025-01-10

## 2025-01-10 DIAGNOSIS — R06.09 OTHER FORMS OF DYSPNEA: ICD-10-CM

## 2025-01-10 DIAGNOSIS — I27.20 PULMONARY HYPERTENSION, UNSPECIFIED: ICD-10-CM

## 2025-01-10 DIAGNOSIS — R60.0 LOCALIZED EDEMA: ICD-10-CM

## 2025-01-10 PROCEDURE — 99212 OFFICE O/P EST SF 10 MIN: CPT

## 2025-01-22 ENCOUNTER — NON-APPOINTMENT (OUTPATIENT)
Age: 75
End: 2025-01-22

## 2025-02-06 ENCOUNTER — OUTPATIENT (OUTPATIENT)
Dept: OUTPATIENT SERVICES | Facility: HOSPITAL | Age: 75
LOS: 1 days | End: 2025-02-06
Payer: MEDICARE

## 2025-02-06 ENCOUNTER — NON-APPOINTMENT (OUTPATIENT)
Age: 75
End: 2025-02-06

## 2025-02-06 ENCOUNTER — APPOINTMENT (OUTPATIENT)
Dept: SLEEP CENTER | Facility: HOSPITAL | Age: 75
End: 2025-02-06

## 2025-02-06 DIAGNOSIS — G47.33 OBSTRUCTIVE SLEEP APNEA (ADULT) (PEDIATRIC): ICD-10-CM

## 2025-02-06 PROCEDURE — 95811 POLYSOM 6/>YRS CPAP 4/> PARM: CPT | Mod: 26

## 2025-02-06 PROCEDURE — 95811 POLYSOM 6/>YRS CPAP 4/> PARM: CPT

## 2025-02-07 ENCOUNTER — OUTPATIENT (OUTPATIENT)
Dept: OUTPATIENT SERVICES | Facility: HOSPITAL | Age: 75
LOS: 1 days | End: 2025-02-07
Payer: MEDICARE

## 2025-02-07 ENCOUNTER — APPOINTMENT (OUTPATIENT)
Dept: NUCLEAR MEDICINE | Facility: HOSPITAL | Age: 75
End: 2025-02-07

## 2025-02-07 ENCOUNTER — APPOINTMENT (OUTPATIENT)
Dept: HEART FAILURE | Facility: CLINIC | Age: 75
End: 2025-02-07
Payer: MEDICARE

## 2025-02-07 ENCOUNTER — NON-APPOINTMENT (OUTPATIENT)
Age: 75
End: 2025-02-07

## 2025-02-07 ENCOUNTER — APPOINTMENT (OUTPATIENT)
Dept: PULMONOLOGY | Facility: CLINIC | Age: 75
End: 2025-02-07
Payer: MEDICARE

## 2025-02-07 VITALS
HEIGHT: 64 IN | BODY MASS INDEX: 35 KG/M2 | WEIGHT: 205 LBS | TEMPERATURE: 97.3 F | DIASTOLIC BLOOD PRESSURE: 71 MMHG | SYSTOLIC BLOOD PRESSURE: 137 MMHG | HEART RATE: 65 BPM | OXYGEN SATURATION: 96 % | RESPIRATION RATE: 13 BRPM

## 2025-02-07 DIAGNOSIS — I50.32 CHRONIC DIASTOLIC (CONGESTIVE) HEART FAILURE: ICD-10-CM

## 2025-02-07 DIAGNOSIS — R42 DIZZINESS AND GIDDINESS: ICD-10-CM

## 2025-02-07 DIAGNOSIS — I27.20 PULMONARY HYPERTENSION, UNSPECIFIED: ICD-10-CM

## 2025-02-07 DIAGNOSIS — M06.9 RHEUMATOID ARTHRITIS, UNSPECIFIED: ICD-10-CM

## 2025-02-07 DIAGNOSIS — I51.9 HEART DISEASE, UNSPECIFIED: ICD-10-CM

## 2025-02-07 PROCEDURE — A9540: CPT

## 2025-02-07 PROCEDURE — 99215 OFFICE O/P EST HI 40 MIN: CPT

## 2025-02-07 PROCEDURE — 94060 EVALUATION OF WHEEZING: CPT

## 2025-02-07 PROCEDURE — 94729 DIFFUSING CAPACITY: CPT

## 2025-02-07 PROCEDURE — 99214 OFFICE O/P EST MOD 30 MIN: CPT

## 2025-02-07 PROCEDURE — 94729 DIFFUSING CAPACITY: CPT | Mod: 26

## 2025-02-07 PROCEDURE — 94726 PLETHYSMOGRAPHY LUNG VOLUMES: CPT

## 2025-02-07 PROCEDURE — A9567: CPT

## 2025-02-07 PROCEDURE — 78582 LUNG VENTILAT&PERFUS IMAGING: CPT | Mod: 26

## 2025-02-07 PROCEDURE — 78582 LUNG VENTILAT&PERFUS IMAGING: CPT

## 2025-02-07 PROCEDURE — 94760 N-INVAS EAR/PLS OXIMETRY 1: CPT

## 2025-02-07 PROCEDURE — 94726 PLETHYSMOGRAPHY LUNG VOLUMES: CPT | Mod: 26

## 2025-02-07 PROCEDURE — G2211 COMPLEX E/M VISIT ADD ON: CPT

## 2025-02-07 PROCEDURE — 94618 PULMONARY STRESS TESTING: CPT

## 2025-02-07 PROCEDURE — 94010 BREATHING CAPACITY TEST: CPT | Mod: 26

## 2025-02-07 PROCEDURE — 94618 PULMONARY STRESS TESTING: CPT | Mod: 26

## 2025-02-07 RX ORDER — EMPAGLIFLOZIN 10 MG/1
10 TABLET, FILM COATED ORAL
Qty: 90 | Refills: 3 | Status: ACTIVE | COMMUNITY
Start: 2025-02-07 | End: 1900-01-01

## 2025-02-07 RX ORDER — SPIRONOLACTONE 25 MG/1
25 TABLET ORAL DAILY
Qty: 90 | Refills: 3 | Status: ACTIVE | COMMUNITY
Start: 2025-02-07 | End: 1900-01-01

## 2025-02-10 ENCOUNTER — NON-APPOINTMENT (OUTPATIENT)
Age: 75
End: 2025-02-10

## 2025-02-10 DIAGNOSIS — E87.6 HYPOKALEMIA: ICD-10-CM

## 2025-02-10 LAB
ANION GAP SERPL CALC-SCNC: 11 MMOL/L
BUN SERPL-MCNC: 35 MG/DL
CALCIUM SERPL-MCNC: 9.5 MG/DL
CHLORIDE SERPL-SCNC: 100 MMOL/L
CO2 SERPL-SCNC: 31 MMOL/L
CREAT SERPL-MCNC: 1.32 MG/DL
EGFR: 42 ML/MIN/1.73M2
GLUCOSE SERPL-MCNC: 93 MG/DL
HCT VFR BLD CALC: 34.5 %
HGB BLD-MCNC: 10.8 G/DL
MCHC RBC-ENTMCNC: 28.5 PG
MCHC RBC-ENTMCNC: 31.3 G/DL
MCV RBC AUTO: 91 FL
PLATELET # BLD AUTO: 247 K/UL
POTASSIUM SERPL-SCNC: 3.3 MMOL/L
RBC # BLD: 3.79 M/UL
RBC # FLD: 14.7 %
SODIUM SERPL-SCNC: 142 MMOL/L
WBC # FLD AUTO: 4.36 K/UL

## 2025-02-12 ENCOUNTER — NON-APPOINTMENT (OUTPATIENT)
Age: 75
End: 2025-02-12

## 2025-02-13 RX ORDER — SACUBITRIL AND VALSARTAN 49; 51 MG/1; MG/1
49-51 TABLET, FILM COATED ORAL TWICE DAILY
Qty: 60 | Refills: 5 | Status: ACTIVE | COMMUNITY
Start: 2025-02-07 | End: 1900-01-01

## 2025-02-15 ENCOUNTER — RX RENEWAL (OUTPATIENT)
Age: 75
End: 2025-02-15

## 2025-02-18 ENCOUNTER — APPOINTMENT (OUTPATIENT)
Dept: PULMONOLOGY | Facility: CLINIC | Age: 75
End: 2025-02-18

## 2025-02-27 ENCOUNTER — NON-APPOINTMENT (OUTPATIENT)
Age: 75
End: 2025-02-27

## 2025-03-05 ENCOUNTER — NON-APPOINTMENT (OUTPATIENT)
Age: 75
End: 2025-03-05

## 2025-03-10 ENCOUNTER — APPOINTMENT (OUTPATIENT)
Dept: ORTHOPEDIC SURGERY | Facility: CLINIC | Age: 75
End: 2025-03-10

## 2025-03-11 ENCOUNTER — OUTPATIENT (OUTPATIENT)
Dept: OUTPATIENT SERVICES | Facility: HOSPITAL | Age: 75
LOS: 1 days | End: 2025-03-11
Payer: MEDICARE

## 2025-03-11 ENCOUNTER — APPOINTMENT (OUTPATIENT)
Dept: SLEEP CENTER | Facility: HOSPITAL | Age: 75
End: 2025-03-11

## 2025-03-11 DIAGNOSIS — G47.33 OBSTRUCTIVE SLEEP APNEA (ADULT) (PEDIATRIC): ICD-10-CM

## 2025-03-11 PROCEDURE — 95811 POLYSOM 6/>YRS CPAP 4/> PARM: CPT | Mod: 26

## 2025-03-11 PROCEDURE — 95810 POLYSOM 6/> YRS 4/> PARAM: CPT

## 2025-03-14 ENCOUNTER — APPOINTMENT (OUTPATIENT)
Dept: PULMONOLOGY | Facility: CLINIC | Age: 75
End: 2025-03-14
Payer: MEDICARE

## 2025-03-14 DIAGNOSIS — I27.20 PULMONARY HYPERTENSION, UNSPECIFIED: ICD-10-CM

## 2025-03-14 DIAGNOSIS — R60.0 LOCALIZED EDEMA: ICD-10-CM

## 2025-03-14 DIAGNOSIS — R06.09 OTHER FORMS OF DYSPNEA: ICD-10-CM

## 2025-03-14 DIAGNOSIS — G47.33 OBSTRUCTIVE SLEEP APNEA (ADULT) (PEDIATRIC): ICD-10-CM

## 2025-03-14 DIAGNOSIS — I51.9 HEART DISEASE, UNSPECIFIED: ICD-10-CM

## 2025-03-14 LAB
ALBUMIN SERPL ELPH-MCNC: 4.1 G/DL
ALP BLD-CCNC: 59 U/L
ALT SERPL-CCNC: 13 U/L
ANION GAP SERPL CALC-SCNC: 11 MMOL/L
AST SERPL-CCNC: 17 U/L
BILIRUB SERPL-MCNC: 0.2 MG/DL
BUN SERPL-MCNC: 23 MG/DL
CALCIUM SERPL-MCNC: 9.9 MG/DL
CHLORIDE SERPL-SCNC: 104 MMOL/L
CO2 SERPL-SCNC: 28 MMOL/L
CREAT SERPL-MCNC: 1.3 MG/DL
EGFRCR SERPLBLD CKD-EPI 2021: 43 ML/MIN/1.73M2
GLUCOSE SERPL-MCNC: 116 MG/DL
POTASSIUM SERPL-SCNC: 4.1 MMOL/L
PROT SERPL-MCNC: 6.8 G/DL
SODIUM SERPL-SCNC: 144 MMOL/L

## 2025-03-14 PROCEDURE — G2211 COMPLEX E/M VISIT ADD ON: CPT

## 2025-03-14 PROCEDURE — 99214 OFFICE O/P EST MOD 30 MIN: CPT

## 2025-03-17 ENCOUNTER — TRANSCRIPTION ENCOUNTER (OUTPATIENT)
Age: 75
End: 2025-03-17

## 2025-03-17 ENCOUNTER — APPOINTMENT (OUTPATIENT)
Dept: ORTHOPEDIC SURGERY | Facility: CLINIC | Age: 75
End: 2025-03-17

## 2025-03-24 ENCOUNTER — APPOINTMENT (OUTPATIENT)
Dept: ORTHOPEDIC SURGERY | Facility: CLINIC | Age: 75
End: 2025-03-24
Payer: MEDICARE

## 2025-03-24 VITALS — BODY MASS INDEX: 35.85 KG/M2 | HEIGHT: 64 IN | WEIGHT: 210 LBS

## 2025-03-24 DIAGNOSIS — M54.16 RADICULOPATHY, LUMBAR REGION: ICD-10-CM

## 2025-03-24 DIAGNOSIS — M17.0 BILATERAL PRIMARY OSTEOARTHRITIS OF KNEE: ICD-10-CM

## 2025-03-24 DIAGNOSIS — Z96.653 PRESENCE OF ARTIFICIAL KNEE JOINT, BILATERAL: ICD-10-CM

## 2025-03-24 PROCEDURE — 99213 OFFICE O/P EST LOW 20 MIN: CPT

## 2025-03-24 PROCEDURE — 73562 X-RAY EXAM OF KNEE 3: CPT | Mod: 50

## 2025-03-24 PROCEDURE — G2211 COMPLEX E/M VISIT ADD ON: CPT

## 2025-04-07 ENCOUNTER — APPOINTMENT (OUTPATIENT)
Dept: ORTHOPEDIC SURGERY | Facility: CLINIC | Age: 75
End: 2025-04-07
Payer: MEDICARE

## 2025-04-07 VITALS — HEIGHT: 64 IN | WEIGHT: 210 LBS | BODY MASS INDEX: 35.85 KG/M2

## 2025-04-07 DIAGNOSIS — M43.16 SPONDYLOLISTHESIS, LUMBAR REGION: ICD-10-CM

## 2025-04-07 DIAGNOSIS — M48.07 SPINAL STENOSIS, LUMBOSACRAL REGION: ICD-10-CM

## 2025-04-07 PROCEDURE — 99214 OFFICE O/P EST MOD 30 MIN: CPT

## 2025-04-07 PROCEDURE — 72100 X-RAY EXAM L-S SPINE 2/3 VWS: CPT

## 2025-04-16 ENCOUNTER — NON-APPOINTMENT (OUTPATIENT)
Age: 75
End: 2025-04-16

## 2025-04-17 ENCOUNTER — NON-APPOINTMENT (OUTPATIENT)
Age: 75
End: 2025-04-17

## 2025-04-22 ENCOUNTER — LABORATORY RESULT (OUTPATIENT)
Age: 75
End: 2025-04-22

## 2025-04-22 ENCOUNTER — APPOINTMENT (OUTPATIENT)
Dept: RHEUMATOLOGY | Facility: CLINIC | Age: 75
End: 2025-04-22
Payer: MEDICARE

## 2025-04-22 VITALS
HEART RATE: 65 BPM | SYSTOLIC BLOOD PRESSURE: 159 MMHG | WEIGHT: 198 LBS | DIASTOLIC BLOOD PRESSURE: 84 MMHG | OXYGEN SATURATION: 94 % | HEIGHT: 64 IN | BODY MASS INDEX: 33.8 KG/M2

## 2025-04-22 DIAGNOSIS — I27.20 PULMONARY HYPERTENSION, UNSPECIFIED: ICD-10-CM

## 2025-04-22 DIAGNOSIS — M06.9 RHEUMATOID ARTHRITIS, UNSPECIFIED: ICD-10-CM

## 2025-04-22 LAB
25(OH)D3 SERPL-MCNC: 15.1 NG/ML
ALBUMIN SERPL ELPH-MCNC: 3.8 G/DL
ALP BLD-CCNC: 67 U/L
ALT SERPL-CCNC: 18 U/L
ANA SER QL IA: NEGATIVE
ANION GAP SERPL CALC-SCNC: 14 MMOL/L
AST SERPL-CCNC: 21 U/L
BASOPHILS # BLD AUTO: 0.04 K/UL
BASOPHILS NFR BLD AUTO: 1.1 %
BILIRUB SERPL-MCNC: 0.2 MG/DL
BUN SERPL-MCNC: 21 MG/DL
CALCIUM SERPL-MCNC: 9.7 MG/DL
CCP AB SER IA-ACNC: <8 U/ML
CD16+CD56+ CELLS # BLD: 79 CELLS/UL
CD16+CD56+ CELLS NFR BLD: 7 %
CD19 CELLS NFR BLD: 31 CELLS/UL
CD3 CELLS # BLD: 910 CELLS/UL
CD3 CELLS NFR BLD: 86 %
CD3+CD4+ CELLS # BLD: 405 CELLS/UL
CD3+CD4+ CELLS NFR BLD: 39 %
CD3+CD4+ CELLS/CD3+CD8+ CLL SPEC: 0.85 RATIO
CD3+CD8+ CELLS # SPEC: 474 CELLS/UL
CD3+CD8+ CELLS NFR BLD: 46 %
CELLS.CD3-CD19+/CELLS IN BLOOD: 3 %
CENTROMERE IGG SER-ACNC: <0.2 AL
CHLORIDE SERPL-SCNC: 105 MMOL/L
CHROMATIN AB SERPL-ACNC: <0.2 AL
CK SERPL-CCNC: 136 U/L
CO2 SERPL-SCNC: 25 MMOL/L
CREAT SERPL-MCNC: 1.01 MG/DL
CRP SERPL-MCNC: 5 MG/L
DSDNA AB SER-ACNC: <1 IU/ML
EGFRCR SERPLBLD CKD-EPI 2021: 58 ML/MIN/1.73M2
ENA JO1 AB SER IA-ACNC: <0.2 AL
ENA RNP AB SER IA-ACNC: 0.2 AL
ENA SCL70 IGG SER IA-ACNC: <0.2 AL
ENA SM AB SER IA-ACNC: <0.2 AL
ENA SS-A AB SER IA-ACNC: <0.2 AL
ENA SS-B AB SER IA-ACNC: 0.2 AL
EOSINOPHIL # BLD AUTO: 0.13 K/UL
EOSINOPHIL NFR BLD AUTO: 3.4 %
ERYTHROCYTE [SEDIMENTATION RATE] IN BLOOD BY WESTERGREN METHOD: 82 MM/HR
GLUCOSE SERPL-MCNC: 101 MG/DL
HCT VFR BLD CALC: 38.5 %
HGB BLD-MCNC: 12.2 G/DL
IGA SER QL IEP: 119 MG/DL
IGG SER QL IEP: 884 MG/DL
IGM SER QL IEP: 71 MG/DL
IMM GRANULOCYTES NFR BLD AUTO: 0.3 %
LUPUS ANTICOAGULANT CASCADE REFLEX: NORMAL
LYMPHOCYTES # BLD AUTO: 1.31 K/UL
LYMPHOCYTES NFR BLD AUTO: 34.6 %
MAN DIFF?: NORMAL
MCHC RBC-ENTMCNC: 28.2 PG
MCHC RBC-ENTMCNC: 31.7 G/DL
MCV RBC AUTO: 89.1 FL
MONOCYTES # BLD AUTO: 0.53 K/UL
MONOCYTES NFR BLD AUTO: 14 %
MYELOPEROXIDASE AB SER QL IA: <0.2 AL
MYELOPEROXIDASE CELLS FLD QL: NEGATIVE
NEUTROPHILS # BLD AUTO: 1.77 K/UL
NEUTROPHILS NFR BLD AUTO: 46.6 %
PLATELET # BLD AUTO: 262 K/UL
POTASSIUM SERPL-SCNC: 4 MMOL/L
PROT SERPL-MCNC: 6.5 G/DL
PROTEINASE3 AB SER IA-ACNC: <0.2 AL
PROTEINASE3 AB SER-ACNC: NEGATIVE
RBC # BLD: 4.32 M/UL
RBC # FLD: 14.8 %
RF+CCP IGG SER-IMP: NEGATIVE
RIBOSOMAL P AB SER IA-ACNC: <0.2 AL
SODIUM SERPL-SCNC: 143 MMOL/L
VIABILITY: NORMAL
WBC # FLD AUTO: 3.79 K/UL

## 2025-04-22 PROCEDURE — G2211 COMPLEX E/M VISIT ADD ON: CPT

## 2025-04-22 PROCEDURE — 99215 OFFICE O/P EST HI 40 MIN: CPT

## 2025-04-22 RX ORDER — RITUXIMAB 10 MG/ML
500 INJECTION, SOLUTION INTRAVENOUS
Refills: 0 | Status: ACTIVE | OUTPATIENT
Start: 2025-04-22

## 2025-04-22 RX ORDER — METHYLPREDNISOLONE SODIUM SUCCINATE 125 MG/2ML
125 INJECTION, POWDER, FOR SOLUTION INTRAMUSCULAR; INTRAVENOUS
Refills: 0 | Status: ACTIVE | OUTPATIENT
Start: 2025-04-22

## 2025-04-22 RX ORDER — CETIRIZINE HYDROCHLORIDE 10 MG/1
10 CAPSULE, LIQUID FILLED ORAL
Refills: 0 | Status: ACTIVE | OUTPATIENT
Start: 2025-04-22

## 2025-04-22 RX ADMIN — CETIRIZINE HYDROCHLORIDE 0 MG: 10 CAPSULE, LIQUID FILLED ORAL at 00:00

## 2025-04-22 RX ADMIN — RITUXIMAB 0 MG/50ML: 10 INJECTION, SOLUTION INTRAVENOUS at 00:00

## 2025-04-22 RX ADMIN — METHYLPREDNISOLONE SODIUM SUCCINATE 0 MG: 125 INJECTION, POWDER, FOR SOLUTION INTRAMUSCULAR; INTRAVENOUS at 00:00

## 2025-04-22 RX ADMIN — Medication 0 MG: at 00:00

## 2025-04-23 DIAGNOSIS — E55.9 VITAMIN D DEFICIENCY, UNSPECIFIED: ICD-10-CM

## 2025-04-23 LAB
24R-OH-CALCIDIOL SERPL-MCNC: 14 PG/ML
ACE BLD-CCNC: 36 U/L

## 2025-04-23 RX ORDER — CHOLECALCIFEROL (VITAMIN D3) 1250 MCG
1.25 MG CAPSULE ORAL
Qty: 12 | Refills: 1 | Status: ACTIVE | COMMUNITY
Start: 2025-04-23 | End: 1900-01-01

## 2025-04-24 ENCOUNTER — APPOINTMENT (OUTPATIENT)
Dept: MRI IMAGING | Facility: IMAGING CENTER | Age: 75
End: 2025-04-24

## 2025-04-24 LAB
ANCA AB SER-IMP: NEGATIVE
C-ANCA SER-ACNC: NEGATIVE
P-ANCA TITR SER IF: NEGATIVE

## 2025-04-28 LAB — RNA POLYMERASE III IGG: 31 UNITS

## 2025-05-02 ENCOUNTER — APPOINTMENT (OUTPATIENT)
Dept: PULMONOLOGY | Facility: CLINIC | Age: 75
End: 2025-05-02

## 2025-05-02 ENCOUNTER — APPOINTMENT (OUTPATIENT)
Dept: HEART FAILURE | Facility: CLINIC | Age: 75
End: 2025-05-02

## 2025-05-02 DIAGNOSIS — I27.20 PULMONARY HYPERTENSION, UNSPECIFIED: ICD-10-CM

## 2025-05-02 DIAGNOSIS — I50.32 CHRONIC DIASTOLIC (CONGESTIVE) HEART FAILURE: ICD-10-CM

## 2025-05-02 DIAGNOSIS — G47.33 OBSTRUCTIVE SLEEP APNEA (ADULT) (PEDIATRIC): ICD-10-CM

## 2025-05-02 DIAGNOSIS — R06.09 OTHER FORMS OF DYSPNEA: ICD-10-CM

## 2025-05-03 ENCOUNTER — OUTPATIENT (OUTPATIENT)
Dept: OUTPATIENT SERVICES | Facility: HOSPITAL | Age: 75
LOS: 1 days | End: 2025-05-03

## 2025-05-03 DIAGNOSIS — Z00.8 ENCOUNTER FOR OTHER GENERAL EXAMINATION: ICD-10-CM

## 2025-05-05 ENCOUNTER — APPOINTMENT (OUTPATIENT)
Dept: RHEUMATOLOGY | Facility: CLINIC | Age: 75
End: 2025-05-05
Payer: MEDICARE

## 2025-05-05 ENCOUNTER — APPOINTMENT (OUTPATIENT)
Dept: CARDIOLOGY | Facility: CLINIC | Age: 75
End: 2025-05-05

## 2025-05-05 PROCEDURE — 99213 OFFICE O/P EST LOW 20 MIN: CPT | Mod: 93

## 2025-05-11 ENCOUNTER — OUTPATIENT (OUTPATIENT)
Dept: OUTPATIENT SERVICES | Facility: HOSPITAL | Age: 75
LOS: 1 days | End: 2025-05-11
Payer: MEDICARE

## 2025-05-11 DIAGNOSIS — M48.07 SPINAL STENOSIS, LUMBOSACRAL REGION: ICD-10-CM

## 2025-05-11 DIAGNOSIS — Z00.8 ENCOUNTER FOR OTHER GENERAL EXAMINATION: ICD-10-CM

## 2025-05-11 PROCEDURE — 72148 MRI LUMBAR SPINE W/O DYE: CPT

## 2025-05-14 ENCOUNTER — RX RENEWAL (OUTPATIENT)
Age: 75
End: 2025-05-14

## 2025-05-21 ENCOUNTER — APPOINTMENT (OUTPATIENT)
Dept: INTERNAL MEDICINE | Facility: CLINIC | Age: 75
End: 2025-05-21
Payer: MEDICARE

## 2025-05-21 VITALS
OXYGEN SATURATION: 96 % | SYSTOLIC BLOOD PRESSURE: 150 MMHG | DIASTOLIC BLOOD PRESSURE: 70 MMHG | BODY MASS INDEX: 37.42 KG/M2 | HEART RATE: 72 BPM | TEMPERATURE: 98 F | WEIGHT: 218 LBS

## 2025-05-21 DIAGNOSIS — I10 ESSENTIAL (PRIMARY) HYPERTENSION: ICD-10-CM

## 2025-05-21 DIAGNOSIS — I50.32 CHRONIC DIASTOLIC (CONGESTIVE) HEART FAILURE: ICD-10-CM

## 2025-05-21 DIAGNOSIS — N64.4 MASTODYNIA: ICD-10-CM

## 2025-05-21 PROCEDURE — G2211 COMPLEX E/M VISIT ADD ON: CPT

## 2025-05-21 PROCEDURE — 99213 OFFICE O/P EST LOW 20 MIN: CPT

## 2025-05-29 NOTE — REVIEW OF SYSTEMS
VIRTUAL VISIT PROGRESS NOTE    This visit is being performed virtually via Video visit using [employer].     Clinical Location: [facility] location Home and is physically present in the [facility] at the time of this visit.       CHIEF COMPLAINT  Video Visit and Medication Management      SUBJECTIVE  The patient is a 23 year old male who is being evaluated via a VIDEOfor follow-up ADHD and major depression.  He was last evaluated in Feb 2025 at which time his Adderall was continued at the current dose and his Bupropion adjusted to 300 mg daily.  He is still depressed and stressed.  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  He is still in therapy and his therapist feels that he could use an adjustment in his dose as the change from 150 mg to 300 mg helped but did not fully control his depressive tendencies.    He has been going to the gym a few times a week as well.  He has his own apartment and does have family nearby for support.  .        REVIEW OF SYSTEMS  All systems reviewed and are negative with the exception of the findings noted in the history of present illness.     PHYSICAL EXAM  There were no vitals filed for this visit.   He is alert, nontoxic with fluent speech    ASSESSMENT/PLAN  Attention deficit hyperactivity disorder (ADHD), predominantly inattentive type  - amphetamine-dextroamphetamine (Adderall XR) 25 MG 24 hr capsule; Take 1 capsule by mouth daily. Begin taking on June 17, 2025.  Dispense: 30 capsule; Refill: 0    Recurrent major depressive disorder, in full remission (CMD) (Primary)  - buPROPion XL (WELLBUTRIN XL) 300 MG 24 hr tablet; Take 1 tablet by mouth every morning. Take with the 150 mg dose for a total of 450 mg daily.  Dispense: 90 tablet; Refill: 1  - buPROPion XL (WELLBUTRIN XL) 150 MG 24 hr tablet; Take 1 tablet by mouth daily. Take in addition to the 300 mg dose for a total of 450 mg daily.  Dispense: 90 tablet;  Refill: 1    Plan to continue Adderall at the present dose which remains effective for him and increase Wellbutrin from 300 mg to 450 mg daily with scripts sent.  He should also continue therapy.      FOLLOW UP  Return in about 3 months (around 8/29/2025) for Annual physical and follow-up.       [Fatigue] : no fatigue [Decreased Appetite] : appetite not decreased [Recent Weight Loss (___ Lbs)] : no recent weight loss [Recent Weight Gain (___ Lbs)] : no recent weight gain [Dry Eyes] : no dryness of the eyes [Visual Field Defect] : no visual field defect [Blurry Vision] : no blurred vision [Dysphagia] : no dysphagia [Eyes Itch] : no itching of the eyes [Neck Pain] : no neck pain [Nasal Congestion] : no nasal congestion [Dysphonia] : no dysphonia [Palpitations] : no palpitations [Chest Pain] : no chest pain [Heart Rate Is Slow] : the heart rate was not slow [Shortness Of Breath] : no shortness of breath [Heart Rate Is Fast] : the heart rate was not fast [SOB on Exertion] : no shortness of breath during exertion [Cough] : no cough [Wheezing] : no wheezing was heard [Vomiting] : no vomiting was observed [Constipation] : no constipation [Nausea] : no nausea [Polyuria] : no polyuria [Diarrhea] : no diarrhea [Joint Pain] : no joint pain [Irregular Menses] : regular menses [Joint Stiffness] : no joint stiffness [Muscle Weakness] : no muscle weakness [Muscle Cramps] : no muscle cramps [Hirsutism] : no hirsutism [Acanthosis] : no acanthosis  [Headache] : no headaches [Dizziness] : no dizziness [Tremors] : no tremors [Depression] : no depression [Polydipsia] : no polydipsia [Anxiety] : no anxiety [Cold Intolerance] : cold tolerant [Heat Intolerance] : heat tolerant [Galactorrhea] : no galactorrhea  [Easy Bruising] : no tendency for easy bruising [Swelling] : no swelling [Easy Bleeding] : no ~M tendency for easy bleeding

## 2025-06-05 NOTE — PATIENT PROFILE ADULT - ANY SIGNIFICANT CHANGE IN ABILITY TO PERFORM THE FOLLOWING ADL SINCE THE ONSET OF PRESENT ILLNESS?
- Tested positive for Hepatitis C and has not followed up with infectious disease for treatment.  - Advised to schedule an appointment with infectious disease for further management of Hepatitis C.  - Discussed potential sources of infection and the importance of treatment.  - Encouraged to follow up with infectious disease to prevent liver damage.   no

## 2025-06-06 ENCOUNTER — NON-APPOINTMENT (OUTPATIENT)
Age: 75
End: 2025-06-06

## 2025-06-06 ENCOUNTER — APPOINTMENT (OUTPATIENT)
Dept: ENDOCRINOLOGY | Facility: CLINIC | Age: 75
End: 2025-06-06

## 2025-06-06 ENCOUNTER — APPOINTMENT (OUTPATIENT)
Facility: CLINIC | Age: 75
End: 2025-06-06
Payer: MEDICARE

## 2025-06-06 ENCOUNTER — APPOINTMENT (OUTPATIENT)
Dept: PULMONOLOGY | Facility: CLINIC | Age: 75
End: 2025-06-06
Payer: MEDICARE

## 2025-06-06 ENCOUNTER — OUTPATIENT (OUTPATIENT)
Dept: OUTPATIENT SERVICES | Facility: HOSPITAL | Age: 75
LOS: 1 days | End: 2025-06-06
Payer: MEDICARE

## 2025-06-06 VITALS
WEIGHT: 219.13 LBS | DIASTOLIC BLOOD PRESSURE: 76 MMHG | TEMPERATURE: 97.6 F | RESPIRATION RATE: 13 BRPM | HEIGHT: 64 IN | SYSTOLIC BLOOD PRESSURE: 135 MMHG | BODY MASS INDEX: 37.41 KG/M2 | OXYGEN SATURATION: 95 % | HEART RATE: 70 BPM

## 2025-06-06 DIAGNOSIS — I27.20 PULMONARY HYPERTENSION, UNSPECIFIED: ICD-10-CM

## 2025-06-06 DIAGNOSIS — R60.0 LOCALIZED EDEMA: ICD-10-CM

## 2025-06-06 DIAGNOSIS — G47.33 OBSTRUCTIVE SLEEP APNEA (ADULT) (PEDIATRIC): ICD-10-CM

## 2025-06-06 PROCEDURE — 99215 OFFICE O/P EST HI 40 MIN: CPT

## 2025-06-06 PROCEDURE — 93970 EXTREMITY STUDY: CPT

## 2025-06-06 PROCEDURE — G2211 COMPLEX E/M VISIT ADD ON: CPT

## 2025-06-06 PROCEDURE — 99214 OFFICE O/P EST MOD 30 MIN: CPT

## 2025-06-06 PROCEDURE — 93970 EXTREMITY STUDY: CPT | Mod: 26

## 2025-06-07 LAB
ESTIMATED AVERAGE GLUCOSE: 128 MG/DL
HBA1C MFR BLD HPLC: 6.1 %
NT-PROBNP SERPL-MCNC: 76 PG/ML

## 2025-06-09 RX ORDER — TIRZEPATIDE 2.5 MG/.5ML
2.5 INJECTION, SOLUTION SUBCUTANEOUS
Qty: 4 | Refills: 2 | Status: ACTIVE | COMMUNITY
Start: 2025-06-09 | End: 1900-01-01

## 2025-06-10 ENCOUNTER — APPOINTMENT (OUTPATIENT)
Dept: ULTRASOUND IMAGING | Facility: CLINIC | Age: 75
End: 2025-06-10

## 2025-06-10 ENCOUNTER — APPOINTMENT (OUTPATIENT)
Dept: MAMMOGRAPHY | Facility: CLINIC | Age: 75
End: 2025-06-10

## 2025-06-16 ENCOUNTER — RX RENEWAL (OUTPATIENT)
Age: 75
End: 2025-06-16

## 2025-06-16 ENCOUNTER — APPOINTMENT (OUTPATIENT)
Dept: ENDOCRINOLOGY | Facility: CLINIC | Age: 75
End: 2025-06-16

## 2025-06-16 ENCOUNTER — APPOINTMENT (OUTPATIENT)
Dept: CARDIOLOGY | Facility: CLINIC | Age: 75
End: 2025-06-16

## 2025-06-17 ENCOUNTER — APPOINTMENT (OUTPATIENT)
Dept: RHEUMATOLOGY | Facility: CLINIC | Age: 75
End: 2025-06-17

## 2025-06-19 VITALS — WEIGHT: 210.1 LBS | HEIGHT: 64 IN

## 2025-06-19 RX ORDER — CARVEDILOL 3.12 MG/1
1 TABLET, FILM COATED ORAL
Refills: 0 | DISCHARGE

## 2025-06-19 NOTE — H&P ADULT - ASSESSMENT
73 yo F with PMHx of HTN, preDM, ANDRIY (on CPAP), HFpEF, Group II pHTN, PDA (found on recent TTE), small PFO with L to R shunt, who presented to Dr. Tong with increasing shortness of breath over the last 6 months when she had to start taking subway stairs. Pt has also noticed that doing household chores has become more difficult over the last 6 months. Pt denies palpitations, orthopnea, PND, leg edema, n/v/d, fever, chills, hematochezia, hematemesis, melena, BRBPR and other symptoms.   Prior Imaging:   TTE 11/1/24:  EF 59%. Grade 1 LVDD. Mild TR. PASP 61mmHg.   CCTA 8/2023: Calcium score 0 Agatson units. Incomplete opacification of the JACQUELINE, may represent JACQUELINE thrombus vs slow flow. Small L to R transarterial flow suggestive of PFO vs ASD. Possible atrial septal aneurysm,   RHC 12/11/24:  Severe pHTN. PA 84/31/50. RAP 20. PAWP 27. CO/CI 6/3.07.   MANDY 8/25/23:  No JACQUELINE thrombus. L to R interatrial flow.   In light of known pulmonary HTN and worsening symptoms, pt admitted for right heart catheterization    - Pt is candidate for moderate sedation.   - VSS.     Risks & benefits of procedure and alternative therapy have been explained to the patient including but not limited to: allergic reaction, bleeding w/possible need for blood transfusion, infection, renal and vascular compromise, limb damage, arrhythmia, stroke, vessel dissection/perforation, Myocardial infarction, emergent CABG. Informed consent obtained and in chart.

## 2025-06-19 NOTE — H&P ADULT - NSICDXPASTMEDICALHX_GEN_ALL_CORE_FT
PAST MEDICAL HISTORY:  Carpal tunnel syndrome     Chronic back pain     H/O pulmonary hypertension     HLD (hyperlipidemia)     Hypertension     ANDRIY (obstructive sleep apnea)     Osteoarthritis     PFO (patent foramen ovale)     Prediabetes

## 2025-06-19 NOTE — H&P ADULT - HISTORY OF PRESENT ILLNESS
Cardiologist:   Pharmacy:  Escort:    75 yo F with PMHx of HTN, preDM, ANDRIY (on CPAP), pHTN, PDA (found on recent TTE) who presented to Dr. Tong with increasing shortness of breath over the last 6 months when she had to start taking subway stairs. Pt has also noticed that doing household chores has become more difficult over the last 6 months. Pt ___ denies palpitations, orthopnea, PND, leg edema, n/v/d, fever, chills, hematochezia, hematemesis, melena, BRBPR and other symptoms.       Pulmonologist: Dr. Tong  Pharmacy:  Escort:    73 yo F with PMHx of HTN, preDM, ANDRIY (on CPAP), Group II pHTN, PDA (found on recent TTE), small PFO with L to R shunt, who presented to Dr. Tong with increasing shortness of breath over the last 6 months when she had to start taking subway stairs. Pt has also noticed that doing household chores has become more difficult over the last 6 months. Pt ___ denies palpitations, orthopnea, PND, leg edema, n/v/d, fever, chills, hematochezia, hematemesis, melena, BRBPR and other symptoms.     Prior Imaging:   TTE 11/1/24:  EF 59%. Grade 1 LVDD. Mild TR. PASP 61mmHg.   CCTA 8/2023: Calcium score 0 Agatson units. Incomplete opacification of the JACQUELINE, may represent JACQUELINE thrombus vs slow flow. Small L to R transarterial flow suggestive of PFO vs ASD. Possible atrial septal aneurysm,   RHC 12/11/24:  Severe pHTN. PA 84/31/50. RAP 20. PAWP 27. CO/CI 6/3.07.   MANDY 8/25/23:  No JACQUELINE thrombus. L to R interatrial flow.       In light of known pulmonary HTN and worsening symptoms, pt admitted for right heart catheterization        Pulmonologist: Dr. Tong  Pharmacy:  Escort:    73 yo F with PMHx of HTN, preDM, ANDRIY (on CPAP), HFpEF, Group II pHTN, PDA (found on recent TTE), small PFO with L to R shunt, who presented to Dr. Tong with increasing shortness of breath over the last 6 months when she had to start taking subway stairs. Pt has also noticed that doing household chores has become more difficult over the last 6 months. Pt ___ denies palpitations, orthopnea, PND, leg edema, n/v/d, fever, chills, hematochezia, hematemesis, melena, BRBPR and other symptoms.     Prior Imaging:   TTE 11/1/24:  EF 59%. Grade 1 LVDD. Mild TR. PASP 61mmHg.   CCTA 8/2023: Calcium score 0 Agatson units. Incomplete opacification of the JACQUELINE, may represent JACQUELINE thrombus vs slow flow. Small L to R transarterial flow suggestive of PFO vs ASD. Possible atrial septal aneurysm,   RHC 12/11/24:  Severe pHTN. PA 84/31/50. RAP 20. PAWP 27. CO/CI 6/3.07.   MANDY 8/25/23:  No JACQUELINE thrombus. L to R interatrial flow.       In light of known pulmonary HTN and worsening symptoms, pt admitted for right heart catheterization        Pulmonologist: Dr. Tong  Pharmacy: [facility] (in [place])    73 yo F with PMHx of HTN, preDM, ANDRIY (on CPAP), HFpEF, Group II pHTN, PDA (found on recent TTE), small PFO with L to R shunt, who presented to Dr. Tong with increasing shortness of breath over the last 6 months when she had to start taking subway stairs. Pt has also noticed that doing household chores has become more difficult over the last 6 months. Pt denies palpitations, orthopnea, PND, leg edema, n/v/d, fever, chills, hematochezia, hematemesis, melena, BRBPR and other symptoms.     Prior Imaging:   TTE 11/1/24:  EF 59%. Grade 1 LVDD. Mild TR. PASP 61mmHg.   CCTA 8/2023: Calcium score 0 Agatson units. Incomplete opacification of the JACQUELINE, may represent JACQUELINE thrombus vs slow flow. Small L to R transarterial flow suggestive of PFO vs ASD. Possible atrial septal aneurysm,   RHC 12/11/24:  Severe pHTN. PA 84/31/50. RAP 20. PAWP 27. CO/CI 6/3.07.   MANDY 8/25/23:  No JACQUELINE thrombus. L to R interatrial flow.     In light of known pulmonary HTN and worsening symptoms, pt admitted for right heart catheterization

## 2025-06-20 ENCOUNTER — RX RENEWAL (OUTPATIENT)
Age: 75
End: 2025-06-20

## 2025-06-23 ENCOUNTER — APPOINTMENT (OUTPATIENT)
Dept: INTERNAL MEDICINE | Facility: CLINIC | Age: 75
End: 2025-06-23
Payer: MEDICARE

## 2025-06-23 VITALS
BODY MASS INDEX: 36.88 KG/M2 | TEMPERATURE: 98 F | WEIGHT: 216 LBS | OXYGEN SATURATION: 95 % | HEART RATE: 71 BPM | HEIGHT: 64 IN | SYSTOLIC BLOOD PRESSURE: 126 MMHG | DIASTOLIC BLOOD PRESSURE: 76 MMHG

## 2025-06-23 PROCEDURE — 99214 OFFICE O/P EST MOD 30 MIN: CPT

## 2025-06-23 PROCEDURE — G2211 COMPLEX E/M VISIT ADD ON: CPT

## 2025-06-24 ENCOUNTER — TRANSCRIPTION ENCOUNTER (OUTPATIENT)
Age: 75
End: 2025-06-24

## 2025-06-26 ENCOUNTER — TRANSCRIPTION ENCOUNTER (OUTPATIENT)
Age: 75
End: 2025-06-26

## 2025-06-30 ENCOUNTER — TRANSCRIPTION ENCOUNTER (OUTPATIENT)
Age: 75
End: 2025-06-30

## 2025-07-07 ENCOUNTER — RX RENEWAL (OUTPATIENT)
Age: 75
End: 2025-07-07

## 2025-07-07 ENCOUNTER — NON-APPOINTMENT (OUTPATIENT)
Age: 75
End: 2025-07-07

## 2025-07-16 ENCOUNTER — NON-APPOINTMENT (OUTPATIENT)
Age: 75
End: 2025-07-16

## 2025-07-17 ENCOUNTER — OUTPATIENT (OUTPATIENT)
Dept: OUTPATIENT SERVICES | Facility: HOSPITAL | Age: 75
LOS: 1 days | End: 2025-07-17
Payer: MEDICARE

## 2025-07-17 ENCOUNTER — NON-APPOINTMENT (OUTPATIENT)
Age: 75
End: 2025-07-17

## 2025-07-17 LAB
A1C WITH ESTIMATED AVERAGE GLUCOSE RESULT: 6.1 % — HIGH (ref 4–5.6)
ALBUMIN SERPL ELPH-MCNC: 4.1 G/DL — SIGNIFICANT CHANGE UP (ref 3.3–5)
ALP SERPL-CCNC: 58 U/L — SIGNIFICANT CHANGE UP (ref 40–120)
ALT FLD-CCNC: 14 U/L — SIGNIFICANT CHANGE UP (ref 10–45)
ANION GAP SERPL CALC-SCNC: 11 MMOL/L — SIGNIFICANT CHANGE UP (ref 5–17)
APTT BLD: 31.7 SEC — SIGNIFICANT CHANGE UP (ref 26.1–36.8)
AST SERPL-CCNC: 16 U/L — SIGNIFICANT CHANGE UP (ref 10–40)
BILIRUB SERPL-MCNC: 0.3 MG/DL — SIGNIFICANT CHANGE UP (ref 0.2–1.2)
BUN SERPL-MCNC: 24 MG/DL — HIGH (ref 7–23)
CALCIUM SERPL-MCNC: 9.6 MG/DL — SIGNIFICANT CHANGE UP (ref 8.4–10.5)
CHLORIDE SERPL-SCNC: 103 MMOL/L — SIGNIFICANT CHANGE UP (ref 96–108)
CHOLEST SERPL-MCNC: 239 MG/DL — HIGH
CK MB CFR SERPL CALC: 4.3 NG/ML — SIGNIFICANT CHANGE UP (ref 0–6.7)
CK SERPL-CCNC: 129 U/L — SIGNIFICANT CHANGE UP (ref 25–170)
CO2 SERPL-SCNC: 26 MMOL/L — SIGNIFICANT CHANGE UP (ref 22–31)
CREAT SERPL-MCNC: 1.07 MG/DL — SIGNIFICANT CHANGE UP (ref 0.5–1.3)
EGFR: 54 ML/MIN/1.73M2 — LOW
EGFR: 54 ML/MIN/1.73M2 — LOW
ESTIMATED AVERAGE GLUCOSE: 128 MG/DL — HIGH (ref 68–114)
GLUCOSE SERPL-MCNC: 102 MG/DL — HIGH (ref 70–99)
HCT VFR BLD CALC: 39.3 % — SIGNIFICANT CHANGE UP (ref 34.5–45)
HDLC SERPL-MCNC: 76 MG/DL — SIGNIFICANT CHANGE UP
HGB BLD-MCNC: 12.3 G/DL — SIGNIFICANT CHANGE UP (ref 11.5–15.5)
INR BLD: 0.91 — SIGNIFICANT CHANGE UP (ref 0.85–1.16)
LDLC SERPL-MCNC: 132 MG/DL — HIGH
LIPID PNL WITH DIRECT LDL SERPL: 132 MG/DL — HIGH
MAGNESIUM SERPL-MCNC: 2.3 MG/DL — SIGNIFICANT CHANGE UP (ref 1.6–2.6)
MCHC RBC-ENTMCNC: 27.5 PG — SIGNIFICANT CHANGE UP (ref 27–34)
MCHC RBC-ENTMCNC: 31.3 G/DL — LOW (ref 32–36)
MCV RBC AUTO: 87.9 FL — SIGNIFICANT CHANGE UP (ref 80–100)
NONHDLC SERPL-MCNC: 163 MG/DL — HIGH
NRBC # BLD AUTO: 0 K/UL — SIGNIFICANT CHANGE UP (ref 0–0)
NRBC # FLD: 0 K/UL — SIGNIFICANT CHANGE UP (ref 0–0)
NRBC BLD AUTO-RTO: 0 /100 WBCS — SIGNIFICANT CHANGE UP (ref 0–0)
PLATELET # BLD AUTO: 239 K/UL — SIGNIFICANT CHANGE UP (ref 150–400)
PMV BLD: 10.5 FL — SIGNIFICANT CHANGE UP (ref 7–13)
POTASSIUM SERPL-MCNC: 3.7 MMOL/L — SIGNIFICANT CHANGE UP (ref 3.5–5.3)
POTASSIUM SERPL-SCNC: 3.7 MMOL/L — SIGNIFICANT CHANGE UP (ref 3.5–5.3)
PROT SERPL-MCNC: 7.3 G/DL — SIGNIFICANT CHANGE UP (ref 6–8.3)
PROTHROM AB SERPL-ACNC: 10.5 SEC — SIGNIFICANT CHANGE UP (ref 9.9–13.4)
RBC # BLD: 4.47 M/UL — SIGNIFICANT CHANGE UP (ref 3.8–5.2)
RBC # FLD: 15.4 % — HIGH (ref 10.3–14.5)
SODIUM SERPL-SCNC: 140 MMOL/L — SIGNIFICANT CHANGE UP (ref 135–145)
TRIGL SERPL-MCNC: 178 MG/DL — HIGH
WBC # BLD: 3.94 K/UL — SIGNIFICANT CHANGE UP (ref 3.8–10.5)
WBC # FLD AUTO: 3.94 K/UL — SIGNIFICANT CHANGE UP (ref 3.8–10.5)

## 2025-07-17 PROCEDURE — 82803 BLOOD GASES ANY COMBINATION: CPT

## 2025-07-17 PROCEDURE — C1769: CPT

## 2025-07-17 PROCEDURE — 80053 COMPREHEN METABOLIC PANEL: CPT

## 2025-07-17 PROCEDURE — 83735 ASSAY OF MAGNESIUM: CPT

## 2025-07-17 PROCEDURE — 82553 CREATINE MB FRACTION: CPT

## 2025-07-17 PROCEDURE — 85610 PROTHROMBIN TIME: CPT

## 2025-07-17 PROCEDURE — 83036 HEMOGLOBIN GLYCOSYLATED A1C: CPT

## 2025-07-17 PROCEDURE — 93451 RIGHT HEART CATH: CPT

## 2025-07-17 PROCEDURE — 99152 MOD SED SAME PHYS/QHP 5/>YRS: CPT | Mod: GC

## 2025-07-17 PROCEDURE — 85730 THROMBOPLASTIN TIME PARTIAL: CPT

## 2025-07-17 PROCEDURE — 82550 ASSAY OF CK (CPK): CPT

## 2025-07-17 PROCEDURE — 80061 LIPID PANEL: CPT

## 2025-07-17 PROCEDURE — 36415 COLL VENOUS BLD VENIPUNCTURE: CPT

## 2025-07-17 PROCEDURE — C1889: CPT

## 2025-07-17 PROCEDURE — C1894: CPT

## 2025-07-17 PROCEDURE — 93451 RIGHT HEART CATH: CPT | Mod: 26,GC

## 2025-07-17 PROCEDURE — 85027 COMPLETE CBC AUTOMATED: CPT

## 2025-07-17 RX ORDER — EMPAGLIFLOZIN 25 MG/1
1 TABLET, FILM COATED ORAL
Refills: 0 | DISCHARGE

## 2025-07-17 RX ORDER — FLUTICASONE PROPIONATE 220 UG/1
2 AEROSOL, METERED RESPIRATORY (INHALATION)
Refills: 0 | DISCHARGE

## 2025-07-17 RX ORDER — SACUBITRIL AND VALSARTAN 6; 6 MG/1; MG/1
1 PELLET ORAL
Refills: 0 | DISCHARGE

## 2025-07-17 RX ORDER — RITUXIMAB-PVVR 100 MG/10ML
0 INJECTION, SOLUTION INTRAVENOUS
Refills: 0 | DISCHARGE

## 2025-07-17 RX ORDER — FUROSEMIDE 10 MG/ML
1 INJECTION INTRAMUSCULAR; INTRAVENOUS
Refills: 0 | DISCHARGE

## 2025-07-17 NOTE — PROGRESS NOTE ADULT - SUBJECTIVE AND OBJECTIVE BOX
Interventional Cardiology PA SDA Discharge Note    Patient without complaints. Ambulated and voided without difficulties    Afebrile, VSS    Ext:    RIJ site: No hematoma,     bleeding, dressing; C/D/I      Pulses:    intact RAD/DP/PT to baseline     A/P:    73 yo F with PMHx of HTN, preDM, ANDRIY (on CPAP), HFpEF, Group II pHTN, PDA (found on recent TTE), small PFO with L to R shunt, whom in light of known pulmonary HTN and worsening symptoms, pt admitted for right heart catheterization.    Pt now s/p RHC 7/17/25 revealing left to right shunting, refer to full cath report for further details.   [facility] site.       1.	Stable for discharge as per attending Dr. Tong after bed rest, pt voids, groin/wrist stable and 30 minutes of ambulation.  2.	Follow-up with PMD/Cardiologist Dr. Tong_ in 1-2 weeks  3.	Discharged forms signed and copies in chart    Epidermal Closure: running

## 2025-07-18 RX ORDER — SEMAGLUTIDE 0.25 MG/.5ML
0.25 INJECTION, SOLUTION SUBCUTANEOUS
Refills: 0 | Status: ACTIVE | COMMUNITY

## 2025-07-21 ENCOUNTER — APPOINTMENT (OUTPATIENT)
Dept: INTERNAL MEDICINE | Facility: CLINIC | Age: 75
End: 2025-07-21
Payer: MEDICARE

## 2025-07-21 VITALS
BODY MASS INDEX: 36.88 KG/M2 | WEIGHT: 216 LBS | DIASTOLIC BLOOD PRESSURE: 80 MMHG | OXYGEN SATURATION: 94 % | HEART RATE: 70 BPM | SYSTOLIC BLOOD PRESSURE: 142 MMHG | HEIGHT: 64 IN

## 2025-07-21 DIAGNOSIS — M54.9 DORSALGIA, UNSPECIFIED: ICD-10-CM

## 2025-07-21 DIAGNOSIS — N64.4 MASTODYNIA: ICD-10-CM

## 2025-07-21 DIAGNOSIS — F41.8 OTHER SPECIFIED ANXIETY DISORDERS: ICD-10-CM

## 2025-07-21 DIAGNOSIS — G47.00 INSOMNIA, UNSPECIFIED: ICD-10-CM

## 2025-07-21 DIAGNOSIS — I10 ESSENTIAL (PRIMARY) HYPERTENSION: ICD-10-CM

## 2025-07-21 PROCEDURE — G2211 COMPLEX E/M VISIT ADD ON: CPT

## 2025-07-21 PROCEDURE — 99214 OFFICE O/P EST MOD 30 MIN: CPT

## 2025-07-23 ENCOUNTER — APPOINTMENT (OUTPATIENT)
Dept: PULMONOLOGY | Facility: CLINIC | Age: 75
End: 2025-07-23
Payer: MEDICARE

## 2025-07-23 DIAGNOSIS — I27.20 PULMONARY HYPERTENSION, UNSPECIFIED: ICD-10-CM

## 2025-07-23 DIAGNOSIS — R06.09 OTHER FORMS OF DYSPNEA: ICD-10-CM

## 2025-07-23 DIAGNOSIS — G47.33 OBSTRUCTIVE SLEEP APNEA (ADULT) (PEDIATRIC): ICD-10-CM

## 2025-07-23 PROCEDURE — 99214 OFFICE O/P EST MOD 30 MIN: CPT | Mod: 2W

## 2025-08-07 ENCOUNTER — NON-APPOINTMENT (OUTPATIENT)
Age: 75
End: 2025-08-07

## 2025-08-07 DIAGNOSIS — I50.32 CHRONIC DIASTOLIC (CONGESTIVE) HEART FAILURE: ICD-10-CM

## 2025-08-07 LAB
ANION GAP SERPL CALC-SCNC: 11 MMOL/L
BUN SERPL-MCNC: 22 MG/DL
CALCIUM SERPL-MCNC: 9.1 MG/DL
CHLORIDE SERPL-SCNC: 111 MMOL/L
CO2 SERPL-SCNC: 24 MMOL/L
CREAT SERPL-MCNC: 1.16 MG/DL
EGFRCR SERPLBLD CKD-EPI 2021: 49 ML/MIN/1.73M2
GLUCOSE SERPL-MCNC: 103 MG/DL
MAGNESIUM SERPL-MCNC: 2.2 MG/DL
NT-PROBNP SERPL-MCNC: 508 PG/ML
POTASSIUM SERPL-SCNC: 3.9 MMOL/L
SODIUM SERPL-SCNC: 146 MMOL/L

## 2025-08-07 RX ORDER — SPIRONOLACTONE 25 MG/1
25 TABLET ORAL
Qty: 30 | Refills: 5 | Status: ACTIVE | COMMUNITY
Start: 2025-08-07 | End: 1900-01-01

## 2025-08-07 RX ORDER — FUROSEMIDE 20 MG/1
20 TABLET ORAL DAILY
Qty: 30 | Refills: 3 | Status: ACTIVE | COMMUNITY
Start: 2025-07-29

## 2025-08-15 ENCOUNTER — NON-APPOINTMENT (OUTPATIENT)
Age: 75
End: 2025-08-15

## 2025-08-15 LAB
ANION GAP SERPL CALC-SCNC: 16 MMOL/L
BUN SERPL-MCNC: 20 MG/DL
CALCIUM SERPL-MCNC: 9.9 MG/DL
CHLORIDE SERPL-SCNC: 102 MMOL/L
CO2 SERPL-SCNC: 26 MMOL/L
CREAT SERPL-MCNC: 1.12 MG/DL
EGFRCR SERPLBLD CKD-EPI 2021: 51 ML/MIN/1.73M2
GLUCOSE SERPL-MCNC: 86 MG/DL
NT-PROBNP SERPL-MCNC: 82 PG/ML
POTASSIUM SERPL-SCNC: 4.1 MMOL/L
SODIUM SERPL-SCNC: 143 MMOL/L

## 2025-08-29 ENCOUNTER — APPOINTMENT (OUTPATIENT)
Dept: RHEUMATOLOGY | Facility: CLINIC | Age: 75
End: 2025-08-29

## 2025-09-04 ENCOUNTER — OUTPATIENT (OUTPATIENT)
Dept: OUTPATIENT SERVICES | Facility: HOSPITAL | Age: 75
LOS: 1 days | End: 2025-09-04
Payer: MEDICARE

## 2025-09-04 DIAGNOSIS — I27.20 PULMONARY HYPERTENSION, UNSPECIFIED: ICD-10-CM

## 2025-09-04 PROCEDURE — 94621 CARDIOPULM EXERCISE TESTING: CPT | Mod: 26

## 2025-09-04 PROCEDURE — 94621 CARDIOPULM EXERCISE TESTING: CPT

## 2025-09-18 ENCOUNTER — RX RENEWAL (OUTPATIENT)
Age: 75
End: 2025-09-18

## 2025-09-18 ENCOUNTER — APPOINTMENT (OUTPATIENT)
Dept: INTERNAL MEDICINE | Facility: CLINIC | Age: 75
End: 2025-09-18
Payer: MEDICARE

## 2025-09-18 VITALS
HEART RATE: 64 BPM | DIASTOLIC BLOOD PRESSURE: 74 MMHG | OXYGEN SATURATION: 92 % | WEIGHT: 212.25 LBS | HEIGHT: 64 IN | SYSTOLIC BLOOD PRESSURE: 108 MMHG | BODY MASS INDEX: 36.23 KG/M2

## 2025-09-18 DIAGNOSIS — F41.8 OTHER SPECIFIED ANXIETY DISORDERS: ICD-10-CM

## 2025-09-18 DIAGNOSIS — N64.4 MASTODYNIA: ICD-10-CM

## 2025-09-18 DIAGNOSIS — M06.9 RHEUMATOID ARTHRITIS, UNSPECIFIED: ICD-10-CM

## 2025-09-18 DIAGNOSIS — Z00.00 ENCOUNTER FOR GENERAL ADULT MEDICAL EXAMINATION W/OUT ABNORMAL FINDINGS: ICD-10-CM

## 2025-09-18 DIAGNOSIS — I10 ESSENTIAL (PRIMARY) HYPERTENSION: ICD-10-CM

## 2025-09-18 DIAGNOSIS — E04.1 NONTOXIC SINGLE THYROID NODULE: ICD-10-CM

## 2025-09-18 LAB
ALBUMIN SERPL ELPH-MCNC: 4.3 G/DL
ALP BLD-CCNC: 45 U/L
ALT SERPL-CCNC: 17 U/L
ANION GAP SERPL CALC-SCNC: 14 MMOL/L
AST SERPL-CCNC: 15 U/L
BILIRUB SERPL-MCNC: 0.3 MG/DL
BUN SERPL-MCNC: 32 MG/DL
CALCIUM SERPL-MCNC: 9.2 MG/DL
CHLORIDE SERPL-SCNC: 100 MMOL/L
CHOLEST SERPL-MCNC: 309 MG/DL
CO2 SERPL-SCNC: 23 MMOL/L
CREAT SERPL-MCNC: 1.3 MG/DL
EGFRCR SERPLBLD CKD-EPI 2021: 43 ML/MIN/1.73M2
GLUCOSE SERPL-MCNC: 96 MG/DL
HCT VFR BLD CALC: 38.9 %
HDLC SERPL-MCNC: 76 MG/DL
HGB BLD-MCNC: 12.5 G/DL
LDLC SERPL-MCNC: 198 MG/DL
MCHC RBC-ENTMCNC: 27.9 PG
MCHC RBC-ENTMCNC: 32.1 G/DL
MCV RBC AUTO: 86.8 FL
NONHDLC SERPL-MCNC: 233 MG/DL
NT-PROBNP SERPL-MCNC: 59 PG/ML
PLATELET # BLD AUTO: 223 K/UL
POTASSIUM SERPL-SCNC: 4.4 MMOL/L
PROT SERPL-MCNC: 7 G/DL
RBC # BLD: 4.48 M/UL
RBC # FLD: 16.3 %
SODIUM SERPL-SCNC: 138 MMOL/L
TRIGL SERPL-MCNC: 187 MG/DL
TSH SERPL-ACNC: 0.57 UIU/ML
WBC # FLD AUTO: 7.01 K/UL

## 2025-09-18 PROCEDURE — 90662 IIV NO PRSV INCREASED AG IM: CPT

## 2025-09-18 PROCEDURE — G0439: CPT

## 2025-09-18 PROCEDURE — G0008: CPT

## 2025-09-19 ENCOUNTER — APPOINTMENT (OUTPATIENT)
Dept: ORTHOPEDIC SURGERY | Facility: CLINIC | Age: 75
End: 2025-09-19

## 2025-09-23 RX ORDER — HYDROCODONE BITARTRATE AND ACETAMINOPHEN 7.5; 325 MG/1; MG/1
7.5-325 TABLET ORAL 3 TIMES DAILY
Refills: 0 | Status: ACTIVE | COMMUNITY
Start: 2025-09-18